# Patient Record
Sex: FEMALE | Race: BLACK OR AFRICAN AMERICAN | Employment: PART TIME | URBAN - NONMETROPOLITAN AREA
[De-identification: names, ages, dates, MRNs, and addresses within clinical notes are randomized per-mention and may not be internally consistent; named-entity substitution may affect disease eponyms.]

---

## 2017-06-26 ENCOUNTER — NURSE TRIAGE (OUTPATIENT)
Dept: ADMINISTRATIVE | Age: 27
End: 2017-06-26

## 2017-09-11 ENCOUNTER — APPOINTMENT (OUTPATIENT)
Dept: GENERAL RADIOLOGY | Age: 27
End: 2017-09-11
Payer: MEDICARE

## 2017-09-11 ENCOUNTER — HOSPITAL ENCOUNTER (EMERGENCY)
Age: 27
Discharge: HOME | End: 2017-09-11
Payer: MEDICARE

## 2017-09-11 ENCOUNTER — NURSE TRIAGE (OUTPATIENT)
Dept: ADMINISTRATIVE | Age: 27
End: 2017-09-11

## 2017-09-11 VITALS
OXYGEN SATURATION: 100 % | SYSTOLIC BLOOD PRESSURE: 130 MMHG | HEIGHT: 69 IN | DIASTOLIC BLOOD PRESSURE: 64 MMHG | WEIGHT: 220 LBS | RESPIRATION RATE: 16 BRPM | TEMPERATURE: 98.9 F | HEART RATE: 58 BPM | BODY MASS INDEX: 32.58 KG/M2

## 2017-09-11 DIAGNOSIS — S50.11XA CONTUSION OF RIGHT FOREARM, INITIAL ENCOUNTER: Primary | ICD-10-CM

## 2017-09-11 PROCEDURE — 99283 EMERGENCY DEPT VISIT LOW MDM: CPT

## 2017-09-11 PROCEDURE — 73090 X-RAY EXAM OF FOREARM: CPT

## 2017-09-11 PROCEDURE — 73110 X-RAY EXAM OF WRIST: CPT

## 2017-09-11 PROCEDURE — 6370000000 HC RX 637 (ALT 250 FOR IP): Performed by: STUDENT IN AN ORGANIZED HEALTH CARE EDUCATION/TRAINING PROGRAM

## 2017-09-11 RX ORDER — ACETAMINOPHEN 325 MG/1
650 TABLET ORAL ONCE
Status: COMPLETED | OUTPATIENT
Start: 2017-09-11 | End: 2017-09-11

## 2017-09-11 RX ADMIN — ACETAMINOPHEN 650 MG: 325 TABLET ORAL at 13:53

## 2017-09-11 ASSESSMENT — ENCOUNTER SYMPTOMS
EYE DISCHARGE: 0
EYE PAIN: 0
SORE THROAT: 0
VOMITING: 0
RHINORRHEA: 0
SHORTNESS OF BREATH: 0
ABDOMINAL PAIN: 0
COUGH: 0
DIARRHEA: 0
NAUSEA: 0
WHEEZING: 0
BACK PAIN: 0

## 2017-09-11 ASSESSMENT — PAIN DESCRIPTION - ORIENTATION: ORIENTATION: RIGHT

## 2017-09-11 ASSESSMENT — PAIN DESCRIPTION - PROGRESSION: CLINICAL_PROGRESSION: GRADUALLY WORSENING

## 2017-09-11 ASSESSMENT — PAIN SCALES - GENERAL
PAINLEVEL_OUTOF10: 8
PAINLEVEL_OUTOF10: 8

## 2017-09-11 ASSESSMENT — PAIN DESCRIPTION - DESCRIPTORS: DESCRIPTORS: SHARP;ACHING;THROBBING

## 2017-09-11 ASSESSMENT — PAIN DESCRIPTION - LOCATION: LOCATION: WRIST

## 2017-09-11 ASSESSMENT — PAIN DESCRIPTION - FREQUENCY: FREQUENCY: CONTINUOUS

## 2017-09-11 ASSESSMENT — PAIN DESCRIPTION - ONSET: ONSET: SUDDEN

## 2017-09-11 ASSESSMENT — PAIN DESCRIPTION - PAIN TYPE: TYPE: ACUTE PAIN

## 2017-09-18 ENCOUNTER — TELEPHONE (OUTPATIENT)
Dept: FAMILY MEDICINE CLINIC | Age: 27
End: 2017-09-18

## 2017-12-11 ENCOUNTER — NURSE TRIAGE (OUTPATIENT)
Dept: ADMINISTRATIVE | Age: 27
End: 2017-12-11

## 2017-12-11 NOTE — TELEPHONE ENCOUNTER
Mathieu Bhatt had left message on voicemail during the night for Call A Nurse. She wanted information on [facility]/there is no answer at the time of callback. Message left for her to call us back if needed.

## 2017-12-23 ENCOUNTER — APPOINTMENT (OUTPATIENT)
Dept: GENERAL RADIOLOGY | Age: 27
End: 2017-12-23
Payer: MEDICARE

## 2017-12-23 ENCOUNTER — NURSE TRIAGE (OUTPATIENT)
Dept: ADMINISTRATIVE | Age: 27
End: 2017-12-23

## 2017-12-23 ENCOUNTER — HOSPITAL ENCOUNTER (EMERGENCY)
Age: 27
Discharge: HOME | End: 2017-12-23
Attending: FAMILY MEDICINE
Payer: MEDICARE

## 2017-12-23 VITALS
RESPIRATION RATE: 18 BRPM | WEIGHT: 230 LBS | BODY MASS INDEX: 34.07 KG/M2 | HEART RATE: 81 BPM | TEMPERATURE: 98.1 F | OXYGEN SATURATION: 97 % | HEIGHT: 69 IN | DIASTOLIC BLOOD PRESSURE: 77 MMHG | SYSTOLIC BLOOD PRESSURE: 141 MMHG
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
## 2017-12-23 DIAGNOSIS — J45.901 EXACERBATION OF ASTHMA, UNSPECIFIED ASTHMA SEVERITY, UNSPECIFIED WHETHER PERSISTENT: Primary | ICD-10-CM

## 2017-12-23 LAB
FLU A ANTIGEN: NEGATIVE
FLU B ANTIGEN: NEGATIVE
PREGNANCY, URINE: NEGATIVE

## 2017-12-23 PROCEDURE — 87804 INFLUENZA ASSAY W/OPTIC: CPT

## 2017-12-23 PROCEDURE — 99284 EMERGENCY DEPT VISIT MOD MDM: CPT

## 2017-12-23 PROCEDURE — 71010 XR CHEST LIMITED ONE VIEW: CPT

## 2017-12-23 PROCEDURE — 72220 X-RAY EXAM SACRUM TAILBONE: CPT

## 2017-12-23 PROCEDURE — 81025 URINE PREGNANCY TEST: CPT

## 2017-12-23 RX ORDER — PREDNISONE 20 MG/1
40 TABLET ORAL DAILY
Qty: 10 TABLET | Refills: 0 | Status: SHIPPED | OUTPATIENT
Start: 2017-12-23 | End: 2017-12-23

## 2017-12-23 RX ORDER — PREDNISONE 20 MG/1
40 TABLET ORAL DAILY
Qty: 10 TABLET | Refills: 0 | Status: SHIPPED | OUTPATIENT
Start: 2017-12-23 | End: 2017-12-28

## 2017-12-23 ASSESSMENT — PAIN SCALES - GENERAL: PAINLEVEL_OUTOF10: 8

## 2017-12-23 ASSESSMENT — ENCOUNTER SYMPTOMS
EYE DISCHARGE: 0
NAUSEA: 0
RHINORRHEA: 1
WHEEZING: 0
SHORTNESS OF BREATH: 0
ABDOMINAL PAIN: 0
COUGH: 1
BACK PAIN: 1
VOMITING: 0
EYE PAIN: 0
SORE THROAT: 0
DIARRHEA: 0

## 2017-12-23 ASSESSMENT — PAIN DESCRIPTION - DESCRIPTORS: DESCRIPTORS: BURNING;THROBBING

## 2017-12-23 ASSESSMENT — PAIN DESCRIPTION - PAIN TYPE: TYPE: ACUTE PAIN

## 2017-12-23 ASSESSMENT — PAIN DESCRIPTION - FREQUENCY: FREQUENCY: CONTINUOUS

## 2017-12-23 ASSESSMENT — PAIN DESCRIPTION - LOCATION: LOCATION: OTHER (COMMENT)

## 2017-12-23 NOTE — ED PROVIDER NOTES
drugs.    PHYSICAL EXAM     INITIAL VITALS:  height is 5' 8.5\" (1.74 m) and weight is 230 lb (104.3 kg). Her oral temperature is 98.1 °F (36.7 °C). Her blood pressure is 141/77 (abnormal) and her pulse is 81. Her respiration is 18 and oxygen saturation is 97%. Physical Exam   Constitutional: She is oriented to person, place, and time. She appears well-developed and well-nourished. HENT:   Head: Normocephalic and atraumatic. Right Ear: Hearing, external ear and ear canal normal. No drainage or swelling. Tympanic membrane is not injected, not scarred, not perforated, not erythematous, not retracted and not bulging. No decreased hearing is noted. Left Ear: Tympanic membrane, external ear and ear canal normal. No drainage or swelling. Tympanic membrane is not injected, not scarred, not perforated, not erythematous, not retracted and not bulging. No decreased hearing is noted. Nose: Rhinorrhea (clear nasal diascharge) present. Mouth/Throat: Oropharynx is clear and moist and mucous membranes are normal.   Eyes: Conjunctivae are normal. Right eye exhibits no discharge. Left eye exhibits no discharge. No scleral icterus. Neck: Normal range of motion. Neck supple. No JVD present. Cardiovascular: Normal rate, regular rhythm and normal heart sounds. Exam reveals no gallop and no friction rub. No murmur heard. Pulmonary/Chest: Effort normal. No respiratory distress. She has no decreased breath sounds. She has wheezes. She has no rhonchi. She has no rales. Abdominal: Soft. She exhibits no distension. There is no tenderness. There is no rebound and no guarding. Musculoskeletal: Normal range of motion. She exhibits no edema. Tenderness in coccyx. Pain exacerbation with leg raise. Neurological: She is alert and oriented to person, place, and time. She exhibits normal muscle tone. She displays no seizure activity. GCS eye subscore is 4. GCS verbal subscore is 5. GCS motor subscore is 6.    Skin: Skin is warm and dry. No rash noted. She is not diaphoretic. Psychiatric: She has a normal mood and affect. Her behavior is normal. Thought content normal.   Nursing note and vitals reviewed. DIFFERENTIAL DIAGNOSIS not limited to    Fracture, Asthma exacerbation, Pneumonia, Bronchitis, and Influenza. DIAGNOSTIC RESULTS     EKG: All EKG's are interpreted by the Emergency Department Physician who either signs or Co-signs this chart in the absence of a cardiologist.  EKG interpreted by Jeff Sequeira MD:    None    RADIOLOGY: non-plain film images(s) such as CT, Ultrasound and MRI are read by the radiologist.    XR SACRUM COCCYX (MIN 2 VIEWS)   Final Result   No acute findings            **This report has been created using voice recognition software. It may contain minor errors which are inherent in voice recognition technology. **      Final report electronically signed by Dr. Marco Foster on 12/23/2017 1:43 PM      XR CHEST 1 VW   Final Result   No acute findings            **This report has been created using voice recognition software. It may contain minor errors which are inherent in voice recognition technology. **      Final report electronically signed by Dr. Marco Foster on 12/23/2017 1:41 PM          LABS:   Labs Reviewed   RAPID INFLUENZA A/B ANTIGENS   PREGNANCY, URINE       EMERGENCY DEPARTMENT COURSE:   Vitals:    Vitals:    12/23/17 1224   BP: (!) 141/77   Pulse: 81   Resp: 18   Temp: 98.1 °F (36.7 °C)   TempSrc: Oral   SpO2: 97%   Weight: 230 lb (104.3 kg)   Height: 5' 8.5\" (1.74 m)       12:36 PM: The patient was seen and evaluated. MDM:  She was seen and evaluated in the emergency for the full tailbone pain. Neurovascularly intact as mentioned above. Negative leg raising test.  Motor 5 over 5 muscle bar. Patient had tenderness in the coccyx area. Patient also had wheezes and states that she's been coughing and having wheezing for the last few days.   Patient has history of asthma has

## 2017-12-23 NOTE — TELEPHONE ENCOUNTER
Reason for Disposition   Minor tailbone injury    Answer Assessment - Initial Assessment Questions  1. MECHANISM: \"How did the injury happen? \"        Slipped on ice   2. ONSET: \"When did the injury happen? \" (Minutes or hours ago)       15 min ago   3. LOCATION: \"Where is the injury located? \"       tailbone  4. SEVERITY: \"Can you sit? \" \"Can you walk? \"       Can walk  In the car sitting now   5. PAIN: \"Is there pain? \" If so, ask: \"How bad is the pain? \"    (Scale 1-10; or mild, moderate, severe)      Hurts  Sharp pain  Moderate   6. SIZE: For bruises, or swelling, ask: \"How large is it? \" (e.g., inches or centimeters)       none  7. OTHER SYMPTOMS: \"Do you have any other symptoms? \" (e.g., numbness, back pain)      none  8. PREGNANCY: \"Is there any chance you are pregnant? \" \"When was your last menstrual period? \"      no    Protocols used: [facility]

## 2018-02-18 ENCOUNTER — NURSE TRIAGE (OUTPATIENT)
Dept: ADMINISTRATIVE | Age: 28
End: 2018-02-18

## 2018-02-18 NOTE — TELEPHONE ENCOUNTER
Reason for Disposition   Menstrual period, missed or late (all triage questions negative)    Answer Assessment - Initial Assessment Questions  1. LMP:  \"When did your last menstrual period begin? \"      1-13-17  2. DAYS LATE: \"How many days late is your menstrual period? \"      Nine days late  3. REGULARITY: \"How regular are your periods? \"      Yes   4. PREGNANCY: \"Is there any chance you are pregnant? \" (e.g., unprotected intercourse, missed birth control pill, broken condom)      Yes   5. BREASTFEEDING: Sherrine Fantasma you breastfeeding? \"      No   6. BIRTH CONTROL PILLS: Sherrine Fantasma you taking birth control pills, or have you stopped recently? \"      No   7. DEPOPROVERA: \"Has your doctor given you a shot to prevent pregnancy? \" (e.g., Depoprovera injection)      No   8. CAUSE: \"What do you think caused the missed period? \" (e.g., stress, rapid weight loss, excessive exercise)      No   9. OTHER SYMPTOMS: \"Do you have any other symptoms? \" (e.g., abdominal pain)      Abdominal cramping 7 out of 10. Nauseated.     Protocols used: MENSTRUAL PERIOD - MISSED OR LATE-ADULT-

## 2018-06-02 ENCOUNTER — HOSPITAL ENCOUNTER (EMERGENCY)
Age: 28
Discharge: HOME | End: 2018-06-02
Payer: MEDICARE

## 2018-06-02 VITALS
RESPIRATION RATE: 16 BRPM | DIASTOLIC BLOOD PRESSURE: 66 MMHG | WEIGHT: 230 LBS | TEMPERATURE: 98.8 F | SYSTOLIC BLOOD PRESSURE: 123 MMHG | HEIGHT: 69 IN | OXYGEN SATURATION: 100 % | BODY MASS INDEX: 34.07 KG/M2 | HEART RATE: 72 BPM

## 2018-06-02 DIAGNOSIS — O26.91 COMPLICATION OF PREGNANCY IN FIRST TRIMESTER: Primary | ICD-10-CM

## 2018-06-02 LAB
ABO: NORMAL
ALBUMIN SERPL-MCNC: 4.1 G/DL (ref 3.5–5.1)
ALP BLD-CCNC: 60 U/L (ref 38–126)
ALT SERPL-CCNC: 16 U/L (ref 11–66)
ANION GAP SERPL CALCULATED.3IONS-SCNC: 10 MEQ/L (ref 8–16)
AST SERPL-CCNC: 17 U/L (ref 5–40)
BASOPHILS # BLD: 0.6 %
BASOPHILS ABSOLUTE: 0.1 THOU/MM3 (ref 0–0.1)
BILIRUB SERPL-MCNC: 0.3 MG/DL (ref 0.3–1.2)
BILIRUBIN URINE: NEGATIVE
BLOOD, URINE: NEGATIVE
BUN BLDV-MCNC: 8 MG/DL (ref 7–22)
CALCIUM SERPL-MCNC: 8.7 MG/DL (ref 8.5–10.5)
CHARACTER, URINE: CLEAR
CHLORIDE BLD-SCNC: 107 MEQ/L (ref 98–111)
CO2: 22 MEQ/L (ref 23–33)
COLOR: YELLOW
CREAT SERPL-MCNC: 0.6 MG/DL (ref 0.4–1.2)
EOSINOPHIL # BLD: 2.4 %
EOSINOPHILS ABSOLUTE: 0.2 THOU/MM3 (ref 0–0.4)
GFR SERPL CREATININE-BSD FRML MDRD: > 90 ML/MIN/1.73M2
GLUCOSE BLD-MCNC: 87 MG/DL (ref 70–108)
GLUCOSE URINE: NEGATIVE MG/DL
HCG,BETA SUBUNIT,QUAL,SERUM: 205.9 MIU/ML (ref 0–5)
HCT VFR BLD CALC: 40.4 % (ref 37–47)
HEMOGLOBIN: 13.7 GM/DL (ref 12–16)
KETONES, URINE: NEGATIVE
LEUKOCYTE ESTERASE, URINE: NEGATIVE
LYMPHOCYTES # BLD: 24.7 %
LYMPHOCYTES ABSOLUTE: 2.5 THOU/MM3 (ref 1–4.8)
MCH RBC QN AUTO: 28.9 PG (ref 27–31)
MCHC RBC AUTO-ENTMCNC: 33.9 GM/DL (ref 33–37)
MCV RBC AUTO: 85.1 FL (ref 81–99)
MONOCYTES # BLD: 5.8 %
MONOCYTES ABSOLUTE: 0.6 THOU/MM3 (ref 0.4–1.3)
NITRITE, URINE: NEGATIVE
NUCLEATED RED BLOOD CELLS: 0 /100 WBC
OSMOLALITY CALCULATION: 275.2 MOSMOL/KG (ref 275–300)
PDW BLD-RTO: 13.5 % (ref 11.5–14.5)
PH UA: 6
PLATELET # BLD: 263 THOU/MM3 (ref 130–400)
PMV BLD AUTO: 9.4 FL (ref 7.4–10.4)
POTASSIUM SERPL-SCNC: 3.9 MEQ/L (ref 3.5–5.2)
PROTEIN UA: NEGATIVE
RBC # BLD: 4.75 MILL/MM3 (ref 4.2–5.4)
RH FACTOR: NORMAL
SEG NEUTROPHILS: 66.5 %
SEGMENTED NEUTROPHILS ABSOLUTE COUNT: 6.8 THOU/MM3 (ref 1.8–7.7)
SODIUM BLD-SCNC: 139 MEQ/L (ref 135–145)
SPECIFIC GRAVITY, URINE: 1.01 (ref 1–1.03)
TOTAL PROTEIN: 7.4 G/DL (ref 6.1–8)
UROBILINOGEN, URINE: 1 EU/DL
WBC # BLD: 10.2 THOU/MM3 (ref 4.8–10.8)

## 2018-06-02 PROCEDURE — 36415 COLL VENOUS BLD VENIPUNCTURE: CPT

## 2018-06-02 PROCEDURE — 84702 CHORIONIC GONADOTROPIN TEST: CPT

## 2018-06-02 PROCEDURE — 99284 EMERGENCY DEPT VISIT MOD MDM: CPT

## 2018-06-02 PROCEDURE — 86900 BLOOD TYPING SEROLOGIC ABO: CPT

## 2018-06-02 PROCEDURE — 86901 BLOOD TYPING SEROLOGIC RH(D): CPT

## 2018-06-02 PROCEDURE — 80053 COMPREHEN METABOLIC PANEL: CPT

## 2018-06-02 PROCEDURE — 81003 URINALYSIS AUTO W/O SCOPE: CPT

## 2018-06-02 PROCEDURE — 85025 COMPLETE CBC W/AUTO DIFF WBC: CPT

## 2018-06-02 ASSESSMENT — ENCOUNTER SYMPTOMS
EYE DISCHARGE: 0
SHORTNESS OF BREATH: 0
EYE PAIN: 0
VOMITING: 0
COUGH: 0
BACK PAIN: 1
ABDOMINAL PAIN: 0
WHEEZING: 0
SORE THROAT: 0
DIARRHEA: 0
NAUSEA: 0
RHINORRHEA: 0

## 2018-06-02 ASSESSMENT — PAIN DESCRIPTION - LOCATION: LOCATION: BACK

## 2018-06-02 ASSESSMENT — PAIN DESCRIPTION - ORIENTATION: ORIENTATION: RIGHT

## 2018-06-02 ASSESSMENT — PAIN SCALES - GENERAL: PAINLEVEL_OUTOF10: 7

## 2018-06-02 ASSESSMENT — PAIN DESCRIPTION - PAIN TYPE: TYPE: ACUTE PAIN

## 2018-06-04 ENCOUNTER — HOSPITAL ENCOUNTER (OUTPATIENT)
Age: 28
Discharge: HOME | End: 2018-06-04
Payer: MEDICARE

## 2018-06-04 LAB — HCG,BETA SUBUNIT,QUAL,SERUM: 685.1 MIU/ML (ref 0–5)

## 2018-06-04 PROCEDURE — 84702 CHORIONIC GONADOTROPIN TEST: CPT

## 2018-06-04 PROCEDURE — 36415 COLL VENOUS BLD VENIPUNCTURE: CPT

## 2018-06-18 ENCOUNTER — NURSE TRIAGE (OUTPATIENT)
Dept: ADMINISTRATIVE | Age: 28
End: 2018-06-18

## 2018-08-25 ENCOUNTER — NURSE TRIAGE (OUTPATIENT)
Dept: ADMINISTRATIVE | Age: 28
End: 2018-08-25

## 2018-08-28 ENCOUNTER — APPOINTMENT (OUTPATIENT)
Dept: GENERAL RADIOLOGY | Age: 28
End: 2018-08-28
Payer: MEDICARE

## 2018-08-28 ENCOUNTER — HOSPITAL ENCOUNTER (EMERGENCY)
Age: 28
Discharge: HOME | End: 2018-08-28
Attending: EMERGENCY MEDICINE
Payer: MEDICARE

## 2018-08-28 VITALS
DIASTOLIC BLOOD PRESSURE: 63 MMHG | HEART RATE: 88 BPM | SYSTOLIC BLOOD PRESSURE: 124 MMHG | OXYGEN SATURATION: 97 % | BODY MASS INDEX: 34.07 KG/M2 | WEIGHT: 230 LBS | RESPIRATION RATE: 16 BRPM | HEIGHT: 69 IN | TEMPERATURE: 98.6 F

## 2018-08-28 DIAGNOSIS — J45.20 MILD INTERMITTENT ASTHMA WITHOUT COMPLICATION: Primary | ICD-10-CM

## 2018-08-28 PROCEDURE — 2709999900 HC NON-CHARGEABLE SUPPLY

## 2018-08-28 PROCEDURE — 6370000000 HC RX 637 (ALT 250 FOR IP): Performed by: EMERGENCY MEDICINE

## 2018-08-28 PROCEDURE — 94640 AIRWAY INHALATION TREATMENT: CPT

## 2018-08-28 PROCEDURE — 71046 X-RAY EXAM CHEST 2 VIEWS: CPT

## 2018-08-28 PROCEDURE — 99285 EMERGENCY DEPT VISIT HI MDM: CPT

## 2018-08-28 RX ORDER — ALBUTEROL SULFATE 0.63 MG/3ML
1 SOLUTION RESPIRATORY (INHALATION) EVERY 6 HOURS PRN
COMMUNITY
End: 2020-03-16 | Stop reason: SDUPTHER

## 2018-08-28 RX ORDER — ALBUTEROL SULFATE 90 UG/1
2 AEROSOL, METERED RESPIRATORY (INHALATION) EVERY 6 HOURS PRN
COMMUNITY
End: 2021-10-18 | Stop reason: ALTCHOICE

## 2018-08-28 RX ORDER — IPRATROPIUM BROMIDE AND ALBUTEROL SULFATE 2.5; .5 MG/3ML; MG/3ML
1 SOLUTION RESPIRATORY (INHALATION) ONCE
Status: COMPLETED | OUTPATIENT
Start: 2018-08-28 | End: 2018-08-28

## 2018-08-28 RX ADMIN — IPRATROPIUM BROMIDE AND ALBUTEROL SULFATE 1 AMPULE: .5; 3 SOLUTION RESPIRATORY (INHALATION) at 16:14

## 2018-08-28 ASSESSMENT — ENCOUNTER SYMPTOMS
VOMITING: 0
ABDOMINAL PAIN: 0
WHEEZING: 0
SHORTNESS OF BREATH: 1
COUGH: 1
RHINORRHEA: 0
SORE THROAT: 0
EYE PAIN: 0
BACK PAIN: 0
DIARRHEA: 0
NAUSEA: 0
EYE DISCHARGE: 0

## 2018-08-28 ASSESSMENT — PAIN DESCRIPTION - PAIN TYPE: TYPE: ACUTE PAIN

## 2018-08-28 ASSESSMENT — PAIN DESCRIPTION - LOCATION: LOCATION: BACK

## 2018-08-28 ASSESSMENT — PAIN SCALES - GENERAL: PAINLEVEL_OUTOF10: 5

## 2018-08-28 NOTE — ED TRIAGE NOTES
Patient presents to room 35 with complaints of shortness of breath. Patient stated it started 4 days ago and has been in and out of ED and urgent care. Pt has a history of asthma. No acute distress noted at this time.

## 2018-08-28 NOTE — ED NOTES
Ambulated patient in cotter. SPO2 prior to ambulation was 97% and post ambulation was 98%. No labored breathing or distress noted.      Duglas Ferraro RN  08/28/18 1200

## 2018-08-28 NOTE — ED PROVIDER NOTES
adenopathy. Psychiatric/Behavioral: Negative for confusion and suicidal ideas. The patient is not nervous/anxious. PAST MEDICAL HISTORY    has a past medical history of Asthma and Nicotine dependence. SURGICAL HISTORY      has a past surgical history that includes  section. CURRENT MEDICATIONS       Discharge Medication List as of 2018  4:28 PM      CONTINUE these medications which have NOT CHANGED    Details   albuterol (ACCUNEB) 0.63 MG/3ML nebulizer solution Take 1 ampule by nebulization every 6 hours as needed for WheezingHistorical Med      albuterol sulfate  (90 Base) MCG/ACT inhaler Inhale 2 puffs into the lungs every 6 hours as needed for WheezingHistorical Med             ALLERGIES     is allergic to rocephin [ceftriaxone] and toradol [ketorolac tromethamine]. FAMILY HISTORY     indicated that her mother is alive. She indicated that her father is alive. family history includes Diabetes in her mother; High Blood Pressure in her mother; Other in her father. SOCIAL HISTORY      reports that she has been smoking Cigarettes. She has been smoking about 0.50 packs per day. She has never used smokeless tobacco. She reports that she does not drink alcohol or use drugs. PHYSICAL EXAM     INITIAL VITALS:  height is 5' 9\" (1.753 m) and weight is 230 lb (104.3 kg). Her oral temperature is 98.6 °F (37 °C). Her blood pressure is 124/63 and her pulse is 88. Her respiration is 16 and oxygen saturation is 97%. Physical Exam   Constitutional: She is oriented to person, place, and time. She appears well-developed and well-nourished. Non-toxic appearance. HENT:   Head: Normocephalic and atraumatic.    Right Ear: Tympanic membrane and external ear normal.   Left Ear: Tympanic membrane and external ear normal.   Nose: Nose normal.   Mouth/Throat: Oropharynx is clear and moist and mucous membranes are normal. No oropharyngeal exudate, posterior oropharyngeal edema or posterior oropharyngeal erythema. Eyes: Conjunctivae and EOM are normal.   Neck: Normal range of motion. Neck supple. No JVD present. Cardiovascular: Normal rate, regular rhythm, normal heart sounds, intact distal pulses and normal pulses. Exam reveals no gallop and no friction rub. No murmur heard. Pulmonary/Chest: Effort normal. No respiratory distress. She has no decreased breath sounds. She has wheezes. She has no rhonchi. She has no rales. Inspiratory and expiratory wheezes mostly to the left lung. Abdominal: Soft. Bowel sounds are normal. She exhibits no distension. There is no tenderness. There is no rebound, no guarding and no CVA tenderness. No abdominal pain or tenderness. Musculoskeletal: Normal range of motion. She exhibits no edema. Neurological: She is alert and oriented to person, place, and time. She exhibits normal muscle tone. Coordination normal.   Skin: Skin is warm and dry. No rash noted. She is not diaphoretic. Nursing note and vitals reviewed. DIFFERENTIAL DIAGNOSIS:   Pneumonia, bronchitis, asthma     DIAGNOSTIC RESULTS     EKG: All EKG's are interpreted by the Emergency Department Physician who either signs or Co-signs this chart in the absence of a cardiologist.  EKG interpreted by Koko Hollins, DO:    None    RADIOLOGY: non-plain film images(s) such as CT, Ultrasound and MRI are read by the radiologist.    XR CHEST STANDARD (2 VW)   Final Result   No definite lobar consolidation. Probable small right pleural effusion. Correlation advised. **This report has been created using voice recognition software. It may contain minor errors which are inherent in voice recognition technology. **      Final report electronically signed by Dr. King Bryant on 8/28/2018 3:52 PM          LABS:   Labs Reviewed - No data to display    EMERGENCY DEPARTMENT COURSE:   Vitals:    Vitals:    08/28/18 1503 08/28/18 1510   BP: 124/63    Pulse: 88 88   Resp: 16    Temp: 98.6 °F (37 °C) TempSrc: Oral    SpO2: 97%    Weight: 230 lb (104.3 kg)    Height: 5' 9\" (1.753 m)        3:42 PM: The patient was seen and evaluated. MDM:  The patient was seen and evaluated by me for shortness of breath with a history of asthma. The patients vital signs were within acceptable range. Upon exam, the patient had inspiratory and expiratory wheezes, mostly in the left lung. She exhibits no abdominal tenderness. Regular cardiac rate and rhythm. The patients SPO2 remained stable at 98% while ambulating in the cotter. Chest xray shows no definite lobar consolidation. Probable small right pleural effusion. She was treated with Duoneb while in the department. The patient's condition improved during her stay in the ED. The patient was amenable to my plan of discharge. They were discharged home , and they will return to the ED if their symptoms become more severe in nature, or otherwise change. CRITICAL CARE:   None     CONSULTS:  None    PROCEDURES:  None     FINAL IMPRESSION      1. Mild intermittent asthma without complication          DISPOSITION/PLAN   discharge    PATIENT REFERRED TO:  ABHAY Mendoza [facility]  [address] [phone]    In 3 days  RE-CHECK AND FURTHER TESTING AS NEEDED      DISCHARGE MEDICATIONS:  Discharge Medication List as of 8/28/2018  4:28 PM          (Please note that portions of this note were completed with a voice recognition program.  Efforts were made to edit the dictations but occasionally words are mis-transcribed.)    Scribe:  yRan Roe   8/28/18 3:42 PM Scribing for and in the presence of Markell Roegrs DO. Signed by: Tsering Arrington 08/28/18 5:08 PM    Provider:  I personally performed the services described in the documentation, reviewed and edited the documentation which was dictated to the scribe in my presence, and it accurately records my words and actions.     Markell Rogers DO 8/28/18 5:08 PM        Markell Rogers DO  08/31/18 5216

## 2018-12-21 ENCOUNTER — HOSPITAL ENCOUNTER (OUTPATIENT)
Age: 28
Setting detail: SPECIMEN
Discharge: HOME | End: 2018-12-21
Payer: MEDICARE

## 2018-12-22 LAB — TSH SERPL DL<=0.05 MIU/L-ACNC: 1.8 MIU/L (ref 0.3–5)

## 2019-02-07 ENCOUNTER — HOSPITAL ENCOUNTER (EMERGENCY)
Age: 29
Discharge: HOME | End: 2019-02-07
Payer: MEDICARE

## 2019-02-07 ENCOUNTER — APPOINTMENT (OUTPATIENT)
Dept: CT IMAGING | Age: 29
End: 2019-02-07
Payer: MEDICARE

## 2019-02-07 ENCOUNTER — NURSE TRIAGE (OUTPATIENT)
Dept: ADMINISTRATIVE | Age: 29
End: 2019-02-07

## 2019-02-07 VITALS
DIASTOLIC BLOOD PRESSURE: 75 MMHG | RESPIRATION RATE: 18 BRPM | OXYGEN SATURATION: 97 % | WEIGHT: 230 LBS | TEMPERATURE: 98.2 F | HEART RATE: 58 BPM | BODY MASS INDEX: 33.97 KG/M2 | SYSTOLIC BLOOD PRESSURE: 132 MMHG

## 2019-02-07 DIAGNOSIS — L08.9 WOUND INFECTION: Primary | ICD-10-CM

## 2019-02-07 DIAGNOSIS — T14.8XXA WOUND INFECTION: Primary | ICD-10-CM

## 2019-02-07 DIAGNOSIS — R93.89 ABNORMAL FINDING ON CT SCAN: ICD-10-CM

## 2019-02-07 LAB
ALBUMIN SERPL-MCNC: 3.3 G/DL (ref 3.5–5.1)
ALP BLD-CCNC: 99 U/L (ref 38–126)
ALT SERPL-CCNC: 27 U/L (ref 11–66)
ANION GAP SERPL CALCULATED.3IONS-SCNC: 9 MEQ/L (ref 8–16)
AST SERPL-CCNC: 27 U/L (ref 5–40)
BACTERIA: ABNORMAL /HPF
BASOPHILS # BLD: 0.2 %
BASOPHILS ABSOLUTE: 0 THOU/MM3 (ref 0–0.1)
BILIRUB SERPL-MCNC: < 0.2 MG/DL (ref 0.3–1.2)
BILIRUBIN DIRECT: < 0.2 MG/DL (ref 0–0.3)
BILIRUBIN URINE: NEGATIVE
BLOOD, URINE: ABNORMAL
BUN BLDV-MCNC: 8 MG/DL (ref 7–22)
CALCIUM SERPL-MCNC: 8.7 MG/DL (ref 8.5–10.5)
CASTS 2: ABNORMAL /LPF
CASTS UA: ABNORMAL /LPF
CHARACTER, URINE: CLEAR
CHLORIDE BLD-SCNC: 107 MEQ/L (ref 98–111)
CO2: 25 MEQ/L (ref 23–33)
COLOR: YELLOW
CREAT SERPL-MCNC: 0.7 MG/DL (ref 0.4–1.2)
CRYSTALS, UA: ABNORMAL
EKG ATRIAL RATE: 54 BPM
EKG P AXIS: 29 DEGREES
EKG P-R INTERVAL: 132 MS
EKG Q-T INTERVAL: 448 MS
EKG QRS DURATION: 66 MS
EKG QTC CALCULATION (BAZETT): 424 MS
EKG R AXIS: 8 DEGREES
EKG T AXIS: 18 DEGREES
EKG VENTRICULAR RATE: 54 BPM
EOSINOPHIL # BLD: 2.9 %
EOSINOPHILS ABSOLUTE: 0.3 THOU/MM3 (ref 0–0.4)
EPITHELIAL CELLS, UA: ABNORMAL /HPF
ERYTHROCYTE [DISTWIDTH] IN BLOOD BY AUTOMATED COUNT: 13.3 % (ref 11.5–14.5)
ERYTHROCYTE [DISTWIDTH] IN BLOOD BY AUTOMATED COUNT: 42.9 FL (ref 35–45)
FLU A ANTIGEN: NEGATIVE
FLU B ANTIGEN: NEGATIVE
GFR SERPL CREATININE-BSD FRML MDRD: > 90 ML/MIN/1.73M2
GLUCOSE BLD-MCNC: 93 MG/DL (ref 70–108)
GLUCOSE URINE: NEGATIVE MG/DL
HCT VFR BLD CALC: 28.8 % (ref 37–47)
HEMOGLOBIN: 9.7 GM/DL (ref 12–16)
IMMATURE GRANS (ABS): 0.03 THOU/MM3 (ref 0–0.07)
IMMATURE GRANULOCYTES: 0.3 %
KETONES, URINE: NEGATIVE
LACTIC ACID: 0.7 MMOL/L (ref 0.5–2.2)
LEUKOCYTE ESTERASE, URINE: ABNORMAL
LIPASE: 12.8 U/L (ref 5.6–51.3)
LYMPHOCYTES # BLD: 21 %
LYMPHOCYTES ABSOLUTE: 2 THOU/MM3 (ref 1–4.8)
MAGNESIUM: 1.8 MG/DL (ref 1.6–2.4)
MCH RBC QN AUTO: 29.7 PG (ref 26–33)
MCHC RBC AUTO-ENTMCNC: 33.7 GM/DL (ref 32.2–35.5)
MCV RBC AUTO: 88.1 FL (ref 81–99)
MISCELLANEOUS 2: ABNORMAL
MONOCYTES # BLD: 5.8 %
MONOCYTES ABSOLUTE: 0.5 THOU/MM3 (ref 0.4–1.3)
NITRITE, URINE: NEGATIVE
NUCLEATED RED BLOOD CELLS: 0 /100 WBC
OSMOLALITY CALCULATION: 279.3 MOSMOL/KG (ref 275–300)
PH UA: 5.5
PLATELET # BLD: 269 THOU/MM3 (ref 130–400)
PMV BLD AUTO: 11.1 FL (ref 9.4–12.4)
POTASSIUM SERPL-SCNC: 4.1 MEQ/L (ref 3.5–5.2)
PROTEIN UA: NEGATIVE
RBC # BLD: 3.27 MILL/MM3 (ref 4.2–5.4)
RBC URINE: ABNORMAL /HPF
RENAL EPITHELIAL, UA: ABNORMAL
SEG NEUTROPHILS: 69.8 %
SEGMENTED NEUTROPHILS ABSOLUTE COUNT: 6.6 THOU/MM3 (ref 1.8–7.7)
SODIUM BLD-SCNC: 141 MEQ/L (ref 135–145)
SPECIFIC GRAVITY, URINE: 1.02 (ref 1–1.03)
TOTAL PROTEIN: 6.3 G/DL (ref 6.1–8)
TROPONIN T: < 0.01 NG/ML
UROBILINOGEN, URINE: 0.2 EU/DL
WBC # BLD: 9.4 THOU/MM3 (ref 4.8–10.8)
WBC UA: ABNORMAL /HPF
YEAST: ABNORMAL

## 2019-02-07 PROCEDURE — 36415 COLL VENOUS BLD VENIPUNCTURE: CPT

## 2019-02-07 PROCEDURE — 83735 ASSAY OF MAGNESIUM: CPT

## 2019-02-07 PROCEDURE — 99285 EMERGENCY DEPT VISIT HI MDM: CPT

## 2019-02-07 PROCEDURE — 87205 SMEAR GRAM STAIN: CPT

## 2019-02-07 PROCEDURE — 93005 ELECTROCARDIOGRAM TRACING: CPT | Performed by: NURSE PRACTITIONER

## 2019-02-07 PROCEDURE — 71275 CT ANGIOGRAPHY CHEST: CPT

## 2019-02-07 PROCEDURE — 80053 COMPREHEN METABOLIC PANEL: CPT

## 2019-02-07 PROCEDURE — 6360000004 HC RX CONTRAST MEDICATION: Performed by: NURSE PRACTITIONER

## 2019-02-07 PROCEDURE — 83690 ASSAY OF LIPASE: CPT

## 2019-02-07 PROCEDURE — 74177 CT ABD & PELVIS W/CONTRAST: CPT

## 2019-02-07 PROCEDURE — 82248 BILIRUBIN DIRECT: CPT

## 2019-02-07 PROCEDURE — 87804 INFLUENZA ASSAY W/OPTIC: CPT

## 2019-02-07 PROCEDURE — 87070 CULTURE OTHR SPECIMN AEROBIC: CPT

## 2019-02-07 PROCEDURE — 85025 COMPLETE CBC W/AUTO DIFF WBC: CPT

## 2019-02-07 PROCEDURE — 84484 ASSAY OF TROPONIN QUANT: CPT

## 2019-02-07 PROCEDURE — 83605 ASSAY OF LACTIC ACID: CPT

## 2019-02-07 PROCEDURE — 81001 URINALYSIS AUTO W/SCOPE: CPT

## 2019-02-07 RX ORDER — CIPROFLOXACIN 500 MG/1
500 TABLET, FILM COATED ORAL 2 TIMES DAILY
Qty: 20 TABLET | Refills: 0 | Status: SHIPPED | OUTPATIENT
Start: 2019-02-07 | End: 2019-02-07

## 2019-02-07 RX ORDER — SULFAMETHOXAZOLE AND TRIMETHOPRIM 800; 160 MG/1; MG/1
1 TABLET ORAL 2 TIMES DAILY
Qty: 20 TABLET | Refills: 0 | Status: SHIPPED | OUTPATIENT
Start: 2019-02-07 | End: 2019-02-17

## 2019-02-07 RX ADMIN — IOPAMIDOL 80 ML: 755 INJECTION, SOLUTION INTRAVENOUS at 02:57

## 2019-02-07 ASSESSMENT — ENCOUNTER SYMPTOMS
SORE THROAT: 0
WHEEZING: 0
BACK PAIN: 0
VOMITING: 0
EYE PAIN: 0
RHINORRHEA: 0
EYE DISCHARGE: 0
COUGH: 0
NAUSEA: 0
DIARRHEA: 0
ABDOMINAL PAIN: 1
SHORTNESS OF BREATH: 0

## 2019-02-07 ASSESSMENT — PAIN DESCRIPTION - ORIENTATION: ORIENTATION: LEFT;RIGHT

## 2019-02-07 ASSESSMENT — PAIN DESCRIPTION - LOCATION: LOCATION: FOOT

## 2019-02-07 ASSESSMENT — PAIN SCALES - GENERAL: PAINLEVEL_OUTOF10: 6

## 2019-02-08 PROCEDURE — 93010 ELECTROCARDIOGRAM REPORT: CPT | Performed by: INTERNAL MEDICINE

## 2019-02-09 LAB
AEROBIC CULTURE: NORMAL
GRAM STAIN RESULT: NORMAL

## 2019-02-28 ENCOUNTER — APPOINTMENT (OUTPATIENT)
Dept: ULTRASOUND IMAGING | Age: 29
End: 2019-02-28
Payer: MEDICARE

## 2019-02-28 ENCOUNTER — APPOINTMENT (OUTPATIENT)
Dept: GENERAL RADIOLOGY | Age: 29
End: 2019-02-28
Payer: MEDICARE

## 2019-02-28 ENCOUNTER — HOSPITAL ENCOUNTER (EMERGENCY)
Age: 29
Discharge: HOME | End: 2019-02-28
Payer: MEDICARE

## 2019-02-28 VITALS
WEIGHT: 220 LBS | BODY MASS INDEX: 32.58 KG/M2 | TEMPERATURE: 97.9 F | RESPIRATION RATE: 18 BRPM | OXYGEN SATURATION: 100 % | SYSTOLIC BLOOD PRESSURE: 133 MMHG | DIASTOLIC BLOOD PRESSURE: 68 MMHG | HEART RATE: 70 BPM | HEIGHT: 69 IN

## 2019-02-28 DIAGNOSIS — Q89.2 THYROGLOSSAL DUCT CYST: Primary | ICD-10-CM

## 2019-02-28 PROCEDURE — 70360 X-RAY EXAM OF NECK: CPT

## 2019-02-28 PROCEDURE — 99283 EMERGENCY DEPT VISIT LOW MDM: CPT

## 2019-02-28 PROCEDURE — 76536 US EXAM OF HEAD AND NECK: CPT

## 2019-02-28 PROCEDURE — 6370000000 HC RX 637 (ALT 250 FOR IP): Performed by: NURSE PRACTITIONER

## 2019-02-28 RX ORDER — CLINDAMYCIN HYDROCHLORIDE 300 MG/1
300 CAPSULE ORAL 3 TIMES DAILY
Qty: 30 CAPSULE | Refills: 0 | Status: SHIPPED | OUTPATIENT
Start: 2019-02-28 | End: 2019-03-14

## 2019-02-28 RX ADMIN — Medication 5 ML: at 17:22

## 2019-02-28 ASSESSMENT — ENCOUNTER SYMPTOMS
NAUSEA: 0
COLOR CHANGE: 0
ABDOMINAL DISTENTION: 0
SINUS PRESSURE: 0
COUGH: 0
PHOTOPHOBIA: 0
TROUBLE SWALLOWING: 1
BLOOD IN STOOL: 0
SHORTNESS OF BREATH: 0
RHINORRHEA: 0
DIARRHEA: 0
VOMITING: 0
CHEST TIGHTNESS: 0
EYE REDNESS: 0
SORE THROAT: 0
VOICE CHANGE: 1
WHEEZING: 0
ABDOMINAL PAIN: 0
CONSTIPATION: 0
BACK PAIN: 0

## 2019-03-01 ENCOUNTER — TELEPHONE (OUTPATIENT)
Dept: ENT CLINIC | Age: 29
End: 2019-03-01

## 2019-03-02 ENCOUNTER — NURSE TRIAGE (OUTPATIENT)
Dept: ADMINISTRATIVE | Age: 29
End: 2019-03-02

## 2019-03-14 ENCOUNTER — OFFICE VISIT (OUTPATIENT)
Dept: ENT CLINIC | Age: 29
End: 2019-03-14
Payer: MEDICARE

## 2019-03-14 ENCOUNTER — HOSPITAL ENCOUNTER (OUTPATIENT)
Age: 29
Discharge: HOME | End: 2019-03-14
Payer: MEDICARE

## 2019-03-14 ENCOUNTER — TELEPHONE (OUTPATIENT)
Dept: ENT CLINIC | Age: 29
End: 2019-03-14

## 2019-03-14 VITALS
RESPIRATION RATE: 14 BRPM | HEIGHT: 69 IN | HEART RATE: 80 BPM | TEMPERATURE: 98.8 F | BODY MASS INDEX: 35.4 KG/M2 | WEIGHT: 239 LBS | DIASTOLIC BLOOD PRESSURE: 70 MMHG | SYSTOLIC BLOOD PRESSURE: 118 MMHG

## 2019-03-14 DIAGNOSIS — Q89.2 THYROGLOSSAL DUCT CYST: Primary | ICD-10-CM

## 2019-03-14 DIAGNOSIS — Q89.2 THYROGLOSSAL DUCT CYST: ICD-10-CM

## 2019-03-14 LAB
T4 FREE: 1.01 NG/DL (ref 0.93–1.76)
TSH SERPL DL<=0.05 MIU/L-ACNC: 2.42 UIU/ML (ref 0.4–4.2)

## 2019-03-14 PROCEDURE — 36415 COLL VENOUS BLD VENIPUNCTURE: CPT

## 2019-03-14 PROCEDURE — 84439 ASSAY OF FREE THYROXINE: CPT

## 2019-03-14 PROCEDURE — 84443 ASSAY THYROID STIM HORMONE: CPT

## 2019-03-14 PROCEDURE — 99203 OFFICE O/P NEW LOW 30 MIN: CPT | Performed by: OTOLARYNGOLOGY

## 2019-03-14 RX ORDER — PREDNISONE 20 MG/1
40 TABLET ORAL DAILY
Qty: 12 TABLET | Refills: 0 | Status: SHIPPED | OUTPATIENT
Start: 2019-03-14 | End: 2019-03-28 | Stop reason: SDUPTHER

## 2019-03-19 ENCOUNTER — NURSE TRIAGE (OUTPATIENT)
Dept: ADMINISTRATIVE | Age: 29
End: 2019-03-19

## 2019-03-27 ENCOUNTER — HOSPITAL ENCOUNTER (OUTPATIENT)
Dept: CT IMAGING | Age: 29
Discharge: HOME | End: 2019-03-27
Payer: MEDICARE

## 2019-03-27 DIAGNOSIS — Q89.2 THYROGLOSSAL DUCT CYST: ICD-10-CM

## 2019-03-27 PROCEDURE — 6360000004 HC RX CONTRAST MEDICATION: Performed by: OTOLARYNGOLOGY

## 2019-03-27 PROCEDURE — 70491 CT SOFT TISSUE NECK W/DYE: CPT

## 2019-03-27 RX ADMIN — IOPAMIDOL 65 ML: 755 INJECTION, SOLUTION INTRAVENOUS at 08:28

## 2019-03-28 ENCOUNTER — TELEPHONE (OUTPATIENT)
Dept: ENT CLINIC | Age: 29
End: 2019-03-28

## 2019-03-28 RX ORDER — PREDNISONE 20 MG/1
40 TABLET ORAL DAILY
Qty: 12 TABLET | Refills: 0 | Status: SHIPPED | OUTPATIENT
Start: 2019-03-28 | End: 2019-04-03

## 2019-04-15 ENCOUNTER — OFFICE VISIT (OUTPATIENT)
Dept: ENT CLINIC | Age: 29
End: 2019-04-15
Payer: MEDICARE

## 2019-04-15 VITALS
TEMPERATURE: 98.2 F | WEIGHT: 245.3 LBS | BODY MASS INDEX: 36.33 KG/M2 | HEART RATE: 68 BPM | RESPIRATION RATE: 16 BRPM | SYSTOLIC BLOOD PRESSURE: 128 MMHG | HEIGHT: 69 IN | DIASTOLIC BLOOD PRESSURE: 80 MMHG

## 2019-04-15 DIAGNOSIS — Z87.891 FORMER SMOKER: ICD-10-CM

## 2019-04-15 DIAGNOSIS — Z01.818 PRE-OP TESTING: Primary | ICD-10-CM

## 2019-04-15 DIAGNOSIS — Q89.2 THYROGLOSSAL CYST: Primary | ICD-10-CM

## 2019-04-15 PROCEDURE — 99213 OFFICE O/P EST LOW 20 MIN: CPT | Performed by: OTOLARYNGOLOGY

## 2019-04-15 PROCEDURE — G8417 CALC BMI ABV UP PARAM F/U: HCPCS | Performed by: OTOLARYNGOLOGY

## 2019-04-15 PROCEDURE — G8427 DOCREV CUR MEDS BY ELIG CLIN: HCPCS | Performed by: OTOLARYNGOLOGY

## 2019-04-15 PROCEDURE — 1036F TOBACCO NON-USER: CPT | Performed by: OTOLARYNGOLOGY

## 2019-04-15 ASSESSMENT — ENCOUNTER SYMPTOMS
FACIAL SWELLING: 0
VOMITING: 0
EYE ITCHING: 0
RHINORRHEA: 0
STRIDOR: 0
TROUBLE SWALLOWING: 1
SORE THROAT: 0
NAUSEA: 0
SHORTNESS OF BREATH: 0
COUGH: 0
COLOR CHANGE: 0
VOICE CHANGE: 0
SINUS PAIN: 0
SINUS PRESSURE: 0

## 2019-04-15 NOTE — PROGRESS NOTES
[address] [id number], NOSE AND THROAT  [facility] [address]  [facility] [address] [address]  Dept: [phone]  Dept Fax: [phone]  Loc: [phone]    Marci Perkins is a 33 y.o. female who was referred byNo ref. provider found for:  Chief Complaint   Patient presents with    Other     1 month follow up after thyroid labs. Criss Boucher HPI:   INITIAL HPI:  Marci Perkins is a 33 y.o. female who is 6 weeks post-partum, who presents today for evaluation of infected thyroglossal duct cyst.  She had anterior neck swelling following a URI/sore throat and had gone to the ER. Neck US 2/28/19 revealed cystic mass most consistent with thyroglossal duct cyst.  This is the first time she had this type of swelling. No F/C, N/V. She denies other cervical lymphadenopathy. No issues with swallowing or breathing. She does report globus sensation. Denies history of GERD. No unintentional weight loss. She finished a 14 day course of clindamycin recently. She noted that her neck swelling improved after abx, but she is not yet back to baseline. INTERVAL HPI:  She returns 1 month after infected thyroglossal duct. She reports no new issues since last visit. She finished her round of antibiotic. She is here to discuss thyroglossal duct cyst removal.    History: Allergies   Allergen Reactions    Rocephin [Ceftriaxone] Anaphylaxis    Toradol [Ketorolac Tromethamine] Hives     Current Outpatient Medications   Medication Sig Dispense Refill    albuterol (ACCUNEB) 0.63 MG/3ML nebulizer solution Take 1 ampule by nebulization every 6 hours as needed for Wheezing      albuterol sulfate  (90 Base) MCG/ACT inhaler Inhale 2 puffs into the lungs every 6 hours as needed for Wheezing       No current facility-administered medications for this visit.       Past Medical History:   Diagnosis Date    Asthma     Nicotine dependence       Past Surgical History:   Procedure Laterality Date     SECTION      x3     Family History   Problem Relation Age of Onset    Diabetes Mother     High Blood Pressure Mother     Other Father      Social History     Tobacco Use    Smoking status: Former Smoker     Packs/day: 0.50     Years: 9.00     Pack years: 4.50     Types: Cigarettes     Last attempt to quit: 2019     Years since quittin.1    Smokeless tobacco: Never Used   Substance Use Topics    Alcohol use: No       Subjective:      Review of Systems   Constitutional: Negative. HENT: Positive for trouble swallowing. Negative for ear discharge, ear pain, facial swelling, hearing loss, nosebleeds, postnasal drip, rhinorrhea, sinus pressure, sinus pain, sore throat, tinnitus and voice change. Eyes: Negative for itching and visual disturbance. Respiratory: Negative for cough, shortness of breath and stridor. Gastrointestinal: Negative for nausea and vomiting. Musculoskeletal: Negative for neck pain and neck stiffness. Skin: Negative for color change and wound. Allergic/Immunologic: Negative for environmental allergies and immunocompromised state. Neurological: Negative for dizziness and facial asymmetry. Psychiatric/Behavioral: Negative for behavioral problems and hallucinations. All other systems reviewed and are negative. Rest of review of systems are negative, except as noted in HPI. Objective:   /80 (Site: Left Upper Arm)   Pulse 68   Temp 98.2 °F (36.8 °C) (Oral)   Resp 16   Ht 5' 9\" (1.753 m)   Wt 245 lb 4.8 oz (111.3 kg)   BMI 36.22 kg/m²     PHYSICAL EXAM  Constitutional: He is oriented to person, place, and time. He appears well-developed and well-nourished. No distress. HENT:   Head: Normocephalic and atraumatic. Right Ear: External ear normal. Ear canal clear. Tympanic membrane intact. Middle ear aerated. Left Ear: External ear normal. Ear canal clear. Tympanic membrane intact. Middle ear aerated.     Nose: External nose normal.  Nasal mucosa normal.  No lesions noted. Mouth/Throat: Fair dentition. Oral cavity mucosa normal, no masses or lesions noted. Oropharynx is clear and moist.   Eyes: Pupils are equal, round, and reactive to light. Conjunctivae and EOM are normal.   Neck: Normal range of motion. Neck supple. No JVD present. No tracheal deviation present. No thyromegaly present. There is a 2x2 cm anterior neck mass, soft, non-tender, non-fluctuant. It elevates with tongue protrusion. Cardiovascular: Normal rate. Pulmonary/Chest: Effort normal. No stridor or stertor. No respiratory distress. Musculoskeletal: Normal range of motion. He exhibits no edema or Lymphadenopathy. Neurological: He is alert and oriented to person, place, and time. Cranial nerve II-XII grossly intact. Skin: Skin is warm. No erythema. Psychiatric: Normal mood and affect. Behavior is normal.   Vitals reviewed. Data:  All of the past medical history, past surgical history, family history,social history, allergies and current medications were reviewed with the patient. 3/27/19: Neck CT    1. 2.3 cm cystic-appearing infrahyoid mass most consistent with a thyroglossal duct cyst.   2. 5 mm right thyroid nodule. Assessment & Plan   Diagnoses and all orders for this visit:     Diagnosis Orders   1. Thyroglossal cyst  TX EXCIS THYROID DUCT CYST/SINUS     30-year-old female with anterior neck mass likely thyroglossal duct cyst.  She finished a round of antibiotics and has no signs of active infection. I discussed the risk for surgery which occludes pain, infection bleeding, scar from midline neck incision. We also discussed possibility of recurrence and need for further surgery. She will likely have a drain in her neck immediately after surgery. She does report some dysphagia and globus sensation and I mentioned that this may not improve after surgery. She understood the risks and wished to proceed.   The findings were explained and her questions were answered. No follow-ups on file. Leopoldo Alvine, MD    **This report has been created using voice recognition software. It may contain minor errors which are inherent in voice recognition technology. **

## 2019-04-30 ENCOUNTER — HOSPITAL ENCOUNTER (OUTPATIENT)
Dept: GENERAL RADIOLOGY | Age: 29
Discharge: HOME | End: 2019-04-30
Payer: MEDICARE

## 2019-04-30 ENCOUNTER — HOSPITAL ENCOUNTER (OUTPATIENT)
Age: 29
Discharge: HOME | End: 2019-04-30
Payer: MEDICARE

## 2019-04-30 DIAGNOSIS — Z01.818 PRE-OP TESTING: ICD-10-CM

## 2019-04-30 DIAGNOSIS — Z87.891 FORMER SMOKER: ICD-10-CM

## 2019-04-30 LAB
BASOPHILS # BLD: 0.3 %
BASOPHILS ABSOLUTE: 0 THOU/MM3 (ref 0–0.1)
EOSINOPHIL # BLD: 3.3 %
EOSINOPHILS ABSOLUTE: 0.2 THOU/MM3 (ref 0–0.4)
ERYTHROCYTE [DISTWIDTH] IN BLOOD BY AUTOMATED COUNT: 13.6 % (ref 11.5–14.5)
ERYTHROCYTE [DISTWIDTH] IN BLOOD BY AUTOMATED COUNT: 40.3 FL (ref 35–45)
HCT VFR BLD CALC: 39.3 % (ref 37–47)
HEMOGLOBIN: 13.1 GM/DL (ref 12–16)
IMMATURE GRANS (ABS): 0.01 THOU/MM3 (ref 0–0.07)
IMMATURE GRANULOCYTES: 0.2 %
LYMPHOCYTES # BLD: 27.7 %
LYMPHOCYTES ABSOLUTE: 1.8 THOU/MM3 (ref 1–4.8)
MCH RBC QN AUTO: 27.2 PG (ref 26–33)
MCHC RBC AUTO-ENTMCNC: 33.3 GM/DL (ref 32.2–35.5)
MCV RBC AUTO: 81.5 FL (ref 81–99)
MONOCYTES # BLD: 6.4 %
MONOCYTES ABSOLUTE: 0.4 THOU/MM3 (ref 0.4–1.3)
NUCLEATED RED BLOOD CELLS: 0 /100 WBC
PLATELET # BLD: 283 THOU/MM3 (ref 130–400)
PMV BLD AUTO: 10.2 FL (ref 9.4–12.4)
RBC # BLD: 4.82 MILL/MM3 (ref 4.2–5.4)
SEG NEUTROPHILS: 62.1 %
SEGMENTED NEUTROPHILS ABSOLUTE COUNT: 4.1 THOU/MM3 (ref 1.8–7.7)
WBC # BLD: 6.6 THOU/MM3 (ref 4.8–10.8)

## 2019-04-30 PROCEDURE — 36415 COLL VENOUS BLD VENIPUNCTURE: CPT

## 2019-04-30 PROCEDURE — 71046 X-RAY EXAM CHEST 2 VIEWS: CPT

## 2019-04-30 PROCEDURE — 85025 COMPLETE CBC W/AUTO DIFF WBC: CPT

## 2019-05-06 ENCOUNTER — TELEPHONE (OUTPATIENT)
Dept: ENT CLINIC | Age: 29
End: 2019-05-06

## 2019-05-06 NOTE — TELEPHONE ENCOUNTER
Called patient back. Informed her chest X-ray was normal, no acute findings. Patient verbalized understanding and thanked me.

## 2019-05-07 NOTE — PROGRESS NOTES
PAT call attempted patient unavailable left message with instructions    NPO after midnight  Bring insurance info and drivers license  Wear comfortable clean clothing  Do not bring jewelry   Shower night before and morning of surgery with a liquid antibacterial soap  Bring medications in original bottles  Follow all instructions give by your physician   needed at discharge  Call [phone] for any questions

## 2019-05-09 ENCOUNTER — TELEPHONE (OUTPATIENT)
Dept: ENT CLINIC | Age: 29
End: 2019-05-09

## 2019-05-09 NOTE — TELEPHONE ENCOUNTER
Patient called she is due to have an excision of thyroglossal duct cyst removed on 5/14/19. She is starting to have a sore throat, runny nose, denies fever. She wanted to know what she could take for her symptoms. Please advise.

## 2019-05-09 NOTE — TELEPHONE ENCOUNTER
Patient returned our call. Informed her she needs to be seen by her PCP to evaluate her symptoms and get medication for them. Patient verbalized understanding and thanked me.

## 2019-05-14 ENCOUNTER — ANESTHESIA EVENT (OUTPATIENT)
Dept: OPERATING ROOM | Age: 29
End: 2019-05-14
Payer: MEDICARE

## 2019-05-14 ENCOUNTER — HOSPITAL ENCOUNTER (OUTPATIENT)
Age: 29
Discharge: HOME | End: 2019-05-15
Attending: OTOLARYNGOLOGY | Admitting: OTOLARYNGOLOGY
Payer: MEDICARE

## 2019-05-14 ENCOUNTER — ANESTHESIA (OUTPATIENT)
Dept: OPERATING ROOM | Age: 29
End: 2019-05-14
Payer: MEDICARE

## 2019-05-14 VITALS
RESPIRATION RATE: 17 BRPM | SYSTOLIC BLOOD PRESSURE: 140 MMHG | DIASTOLIC BLOOD PRESSURE: 68 MMHG | OXYGEN SATURATION: 100 % | TEMPERATURE: 67.3 F

## 2019-05-14 DIAGNOSIS — Q89.2 THYROGLOSSAL DUCT CYST: Primary | ICD-10-CM

## 2019-05-14 LAB — PREGNANCY, URINE: NEGATIVE

## 2019-05-14 PROCEDURE — 3700000000 HC ANESTHESIA ATTENDED CARE: Performed by: OTOLARYNGOLOGY

## 2019-05-14 PROCEDURE — 2580000003 HC RX 258: Performed by: OTOLARYNGOLOGY

## 2019-05-14 PROCEDURE — 2709999900 HC NON-CHARGEABLE SUPPLY

## 2019-05-14 PROCEDURE — 6370000000 HC RX 637 (ALT 250 FOR IP): Performed by: OTOLARYNGOLOGY

## 2019-05-14 PROCEDURE — 6360000002 HC RX W HCPCS: Performed by: OTOLARYNGOLOGY

## 2019-05-14 PROCEDURE — 2709999900 HC NON-CHARGEABLE SUPPLY: Performed by: OTOLARYNGOLOGY

## 2019-05-14 PROCEDURE — 3600000003 HC SURGERY LEVEL 3 BASE: Performed by: OTOLARYNGOLOGY

## 2019-05-14 PROCEDURE — 88305 TISSUE EXAM BY PATHOLOGIST: CPT

## 2019-05-14 PROCEDURE — 7100000001 HC PACU RECOVERY - ADDTL 15 MIN: Performed by: OTOLARYNGOLOGY

## 2019-05-14 PROCEDURE — 2500000003 HC RX 250 WO HCPCS: Performed by: NURSE ANESTHETIST, CERTIFIED REGISTERED

## 2019-05-14 PROCEDURE — 2780000010 HC IMPLANT OTHER: Performed by: OTOLARYNGOLOGY

## 2019-05-14 PROCEDURE — 81025 URINE PREGNANCY TEST: CPT

## 2019-05-14 PROCEDURE — 3600000013 HC SURGERY LEVEL 3 ADDTL 15MIN: Performed by: OTOLARYNGOLOGY

## 2019-05-14 PROCEDURE — 60280 REMOVE THYROID DUCT LESION: CPT | Performed by: OTOLARYNGOLOGY

## 2019-05-14 PROCEDURE — 7100000000 HC PACU RECOVERY - FIRST 15 MIN: Performed by: OTOLARYNGOLOGY

## 2019-05-14 PROCEDURE — 2500000003 HC RX 250 WO HCPCS: Performed by: OTOLARYNGOLOGY

## 2019-05-14 PROCEDURE — APPNB15 APP NON BILLABLE TIME 0-15 MINS: Performed by: PHYSICIAN ASSISTANT

## 2019-05-14 PROCEDURE — 3700000001 HC ADD 15 MINUTES (ANESTHESIA): Performed by: OTOLARYNGOLOGY

## 2019-05-14 PROCEDURE — 96360 HYDRATION IV INFUSION INIT: CPT

## 2019-05-14 PROCEDURE — 96361 HYDRATE IV INFUSION ADD-ON: CPT

## 2019-05-14 PROCEDURE — 6360000002 HC RX W HCPCS: Performed by: NURSE ANESTHETIST, CERTIFIED REGISTERED

## 2019-05-14 DEVICE — CLIP LIG SM TI 6 BLU HNDL FOR OPN AND ENDOSCP SGL APPL: Type: IMPLANTABLE DEVICE | Status: FUNCTIONAL

## 2019-05-14 DEVICE — CLIP LIG M TI 6 SIL HNDL FOR OPN AND ENDOSCP SGL APPL: Type: IMPLANTABLE DEVICE | Status: FUNCTIONAL

## 2019-05-14 DEVICE — SEALANT HEMSTAT 5ML HUM FIBRIN THROM 2 VI APPL DEV EVICEL: Type: IMPLANTABLE DEVICE | Status: FUNCTIONAL

## 2019-05-14 RX ORDER — GLYCOPYRROLATE 1 MG/5 ML
SYRINGE (ML) INTRAVENOUS PRN
Status: DISCONTINUED | OUTPATIENT
Start: 2019-05-14 | End: 2019-05-14 | Stop reason: SDUPTHER

## 2019-05-14 RX ORDER — ONDANSETRON 2 MG/ML
4 INJECTION INTRAMUSCULAR; INTRAVENOUS EVERY 6 HOURS PRN
Status: DISCONTINUED | OUTPATIENT
Start: 2019-05-14 | End: 2019-05-15 | Stop reason: HOSPADM

## 2019-05-14 RX ORDER — NEOSTIGMINE METHYLSULFATE 5 MG/5 ML
SYRINGE (ML) INTRAVENOUS PRN
Status: DISCONTINUED | OUTPATIENT
Start: 2019-05-14 | End: 2019-05-14 | Stop reason: SDUPTHER

## 2019-05-14 RX ORDER — FENTANYL CITRATE 50 UG/ML
50 INJECTION, SOLUTION INTRAMUSCULAR; INTRAVENOUS EVERY 5 MIN PRN
Status: DISCONTINUED | OUTPATIENT
Start: 2019-05-14 | End: 2019-05-14 | Stop reason: HOSPADM

## 2019-05-14 RX ORDER — DEXAMETHASONE SODIUM PHOSPHATE 4 MG/ML
INJECTION, SOLUTION INTRA-ARTICULAR; INTRALESIONAL; INTRAMUSCULAR; INTRAVENOUS; SOFT TISSUE PRN
Status: DISCONTINUED | OUTPATIENT
Start: 2019-05-14 | End: 2019-05-14 | Stop reason: SDUPTHER

## 2019-05-14 RX ORDER — PROPOFOL 10 MG/ML
INJECTION, EMULSION INTRAVENOUS PRN
Status: DISCONTINUED | OUTPATIENT
Start: 2019-05-14 | End: 2019-05-14 | Stop reason: SDUPTHER

## 2019-05-14 RX ORDER — HYDROCODONE BITARTRATE AND ACETAMINOPHEN 5; 325 MG/1; MG/1
2 TABLET ORAL EVERY 4 HOURS PRN
Status: DISCONTINUED | OUTPATIENT
Start: 2019-05-14 | End: 2019-05-15 | Stop reason: HOSPADM

## 2019-05-14 RX ORDER — LABETALOL HYDROCHLORIDE 5 MG/ML
10 INJECTION, SOLUTION INTRAVENOUS EVERY 10 MIN PRN
Status: DISCONTINUED | OUTPATIENT
Start: 2019-05-14 | End: 2019-05-14 | Stop reason: HOSPADM

## 2019-05-14 RX ORDER — HYDROCODONE BITARTRATE AND ACETAMINOPHEN 5; 325 MG/1; MG/1
1 TABLET ORAL EVERY 4 HOURS PRN
Status: DISCONTINUED | OUTPATIENT
Start: 2019-05-14 | End: 2019-05-15 | Stop reason: HOSPADM

## 2019-05-14 RX ORDER — SUCCINYLCHOLINE/SOD CL,ISO/PF 200MG/10ML
SYRINGE (ML) INTRAVENOUS PRN
Status: DISCONTINUED | OUTPATIENT
Start: 2019-05-14 | End: 2019-05-14 | Stop reason: SDUPTHER

## 2019-05-14 RX ORDER — DEXTROSE AND SODIUM CHLORIDE 5; .45 G/100ML; G/100ML
INJECTION, SOLUTION INTRAVENOUS CONTINUOUS
Status: DISCONTINUED | OUTPATIENT
Start: 2019-05-14 | End: 2019-05-15 | Stop reason: HOSPADM

## 2019-05-14 RX ORDER — ROCURONIUM BROMIDE 10 MG/ML
INJECTION, SOLUTION INTRAVENOUS PRN
Status: DISCONTINUED | OUTPATIENT
Start: 2019-05-14 | End: 2019-05-14 | Stop reason: SDUPTHER

## 2019-05-14 RX ORDER — SODIUM CHLORIDE 0.9 % (FLUSH) 0.9 %
10 SYRINGE (ML) INJECTION PRN
Status: DISCONTINUED | OUTPATIENT
Start: 2019-05-14 | End: 2019-05-15 | Stop reason: HOSPADM

## 2019-05-14 RX ORDER — SODIUM CHLORIDE 9 MG/ML
INJECTION, SOLUTION INTRAVENOUS CONTINUOUS
Status: DISCONTINUED | OUTPATIENT
Start: 2019-05-14 | End: 2019-05-14

## 2019-05-14 RX ORDER — SODIUM CHLORIDE 0.9 % (FLUSH) 0.9 %
10 SYRINGE (ML) INJECTION EVERY 12 HOURS SCHEDULED
Status: DISCONTINUED | OUTPATIENT
Start: 2019-05-14 | End: 2019-05-15 | Stop reason: HOSPADM

## 2019-05-14 RX ORDER — ONDANSETRON 2 MG/ML
INJECTION INTRAMUSCULAR; INTRAVENOUS PRN
Status: DISCONTINUED | OUTPATIENT
Start: 2019-05-14 | End: 2019-05-14 | Stop reason: SDUPTHER

## 2019-05-14 RX ORDER — CEFAZOLIN SODIUM 1 G/3ML
INJECTION, POWDER, FOR SOLUTION INTRAMUSCULAR; INTRAVENOUS PRN
Status: DISCONTINUED | OUTPATIENT
Start: 2019-05-14 | End: 2019-05-14 | Stop reason: SDUPTHER

## 2019-05-14 RX ORDER — FENTANYL CITRATE 50 UG/ML
25 INJECTION, SOLUTION INTRAMUSCULAR; INTRAVENOUS EVERY 5 MIN PRN
Status: DISCONTINUED | OUTPATIENT
Start: 2019-05-14 | End: 2019-05-14 | Stop reason: HOSPADM

## 2019-05-14 RX ORDER — LIDOCAINE HYDROCHLORIDE 20 MG/ML
INJECTION, SOLUTION INTRAVENOUS PRN
Status: DISCONTINUED | OUTPATIENT
Start: 2019-05-14 | End: 2019-05-14 | Stop reason: SDUPTHER

## 2019-05-14 RX ORDER — FENTANYL CITRATE 50 UG/ML
INJECTION, SOLUTION INTRAMUSCULAR; INTRAVENOUS PRN
Status: DISCONTINUED | OUTPATIENT
Start: 2019-05-14 | End: 2019-05-14 | Stop reason: SDUPTHER

## 2019-05-14 RX ORDER — MIDAZOLAM HYDROCHLORIDE 1 MG/ML
INJECTION INTRAMUSCULAR; INTRAVENOUS PRN
Status: DISCONTINUED | OUTPATIENT
Start: 2019-05-14 | End: 2019-05-14 | Stop reason: SDUPTHER

## 2019-05-14 RX ORDER — PROMETHAZINE HYDROCHLORIDE 25 MG/ML
12.5 INJECTION, SOLUTION INTRAMUSCULAR; INTRAVENOUS
Status: DISCONTINUED | OUTPATIENT
Start: 2019-05-14 | End: 2019-05-14 | Stop reason: HOSPADM

## 2019-05-14 RX ORDER — DOCUSATE SODIUM 100 MG/1
100 CAPSULE, LIQUID FILLED ORAL 2 TIMES DAILY
Status: DISCONTINUED | OUTPATIENT
Start: 2019-05-14 | End: 2019-05-15 | Stop reason: HOSPADM

## 2019-05-14 RX ADMIN — CEFAZOLIN 2000 MG: 1 INJECTION, POWDER, FOR SOLUTION INTRAMUSCULAR; INTRAVENOUS; PARENTERAL at 10:00

## 2019-05-14 RX ADMIN — Medication 140 MG: at 09:45

## 2019-05-14 RX ADMIN — DOCUSATE SODIUM 100 MG: 100 CAPSULE, LIQUID FILLED ORAL at 20:04

## 2019-05-14 RX ADMIN — FENTANYL CITRATE 50 MCG: 50 INJECTION INTRAMUSCULAR; INTRAVENOUS at 11:37

## 2019-05-14 RX ADMIN — FENTANYL CITRATE 100 MCG: 50 INJECTION INTRAMUSCULAR; INTRAVENOUS at 09:45

## 2019-05-14 RX ADMIN — DEXTROSE AND SODIUM CHLORIDE: 5; 450 INJECTION, SOLUTION INTRAVENOUS at 20:13

## 2019-05-14 RX ADMIN — ONDANSETRON 4 MG: 2 INJECTION INTRAMUSCULAR; INTRAVENOUS at 20:04

## 2019-05-14 RX ADMIN — AMPICILLIN SODIUM AND SULBACTAM SODIUM 3 G: 2; 1 INJECTION, POWDER, FOR SOLUTION INTRAMUSCULAR; INTRAVENOUS at 20:04

## 2019-05-14 RX ADMIN — SODIUM CHLORIDE: 9 INJECTION, SOLUTION INTRAVENOUS at 08:27

## 2019-05-14 RX ADMIN — DEXAMETHASONE SODIUM PHOSPHATE 10 MG: 4 INJECTION, SOLUTION INTRAMUSCULAR; INTRAVENOUS at 10:01

## 2019-05-14 RX ADMIN — PROPOFOL 150 MG: 10 INJECTION, EMULSION INTRAVENOUS at 09:45

## 2019-05-14 RX ADMIN — Medication 3 MG: at 12:52

## 2019-05-14 RX ADMIN — DEXTROSE AND SODIUM CHLORIDE: 5; 450 INJECTION, SOLUTION INTRAVENOUS at 14:01

## 2019-05-14 RX ADMIN — ONDANSETRON HYDROCHLORIDE 4 MG: 4 INJECTION, SOLUTION INTRAMUSCULAR; INTRAVENOUS at 12:42

## 2019-05-14 RX ADMIN — SODIUM CHLORIDE: 9 INJECTION, SOLUTION INTRAVENOUS at 13:05

## 2019-05-14 RX ADMIN — ROCURONIUM BROMIDE 40 MG: 10 INJECTION INTRAVENOUS at 09:55

## 2019-05-14 RX ADMIN — HYDROCODONE BITARTRATE AND ACETAMINOPHEN 2 TABLET: 5; 325 TABLET ORAL at 20:04

## 2019-05-14 RX ADMIN — LIDOCAINE HYDROCHLORIDE 80 MG: 20 INJECTION, SOLUTION INTRAVENOUS at 09:45

## 2019-05-14 RX ADMIN — Medication 0.4 MG: at 12:52

## 2019-05-14 RX ADMIN — FENTANYL CITRATE 50 MCG: 50 INJECTION INTRAMUSCULAR; INTRAVENOUS at 11:45

## 2019-05-14 RX ADMIN — FENTANYL CITRATE 50 MCG: 50 INJECTION INTRAMUSCULAR; INTRAVENOUS at 11:19

## 2019-05-14 RX ADMIN — HYDROCODONE BITARTRATE AND ACETAMINOPHEN 2 TABLET: 5; 325 TABLET ORAL at 14:01

## 2019-05-14 RX ADMIN — MIDAZOLAM HYDROCHLORIDE 2 MG: 1 INJECTION, SOLUTION INTRAMUSCULAR; INTRAVENOUS at 09:43

## 2019-05-14 ASSESSMENT — ENCOUNTER SYMPTOMS
SHORTNESS OF BREATH: 0
VOMITING: 0
NAUSEA: 0
RHINORRHEA: 0
TROUBLE SWALLOWING: 1
SORE THROAT: 0
EYE ITCHING: 0
STRIDOR: 0
VOICE CHANGE: 0
SINUS PRESSURE: 0
COLOR CHANGE: 0
FACIAL SWELLING: 0
COUGH: 0
SINUS PAIN: 0

## 2019-05-14 ASSESSMENT — PULMONARY FUNCTION TESTS
PIF_VALUE: 25
PIF_VALUE: 24
PIF_VALUE: 28
PIF_VALUE: 25
PIF_VALUE: 26
PIF_VALUE: 24
PIF_VALUE: 24
PIF_VALUE: 25
PIF_VALUE: 26
PIF_VALUE: 25
PIF_VALUE: 24
PIF_VALUE: 24
PIF_VALUE: 25
PIF_VALUE: 24
PIF_VALUE: 24
PIF_VALUE: 28
PIF_VALUE: 23
PIF_VALUE: 25
PIF_VALUE: 2
PIF_VALUE: 25
PIF_VALUE: 24
PIF_VALUE: 24
PIF_VALUE: 25
PIF_VALUE: 24
PIF_VALUE: 25
PIF_VALUE: 26
PIF_VALUE: 32
PIF_VALUE: 25
PIF_VALUE: 2
PIF_VALUE: 24
PIF_VALUE: 23
PIF_VALUE: 23
PIF_VALUE: 5
PIF_VALUE: 24
PIF_VALUE: 25
PIF_VALUE: 27
PIF_VALUE: 27
PIF_VALUE: 28
PIF_VALUE: 25
PIF_VALUE: 25
PIF_VALUE: 24
PIF_VALUE: 24
PIF_VALUE: 26
PIF_VALUE: 1
PIF_VALUE: 25
PIF_VALUE: 26
PIF_VALUE: 22
PIF_VALUE: 23
PIF_VALUE: 24
PIF_VALUE: 24
PIF_VALUE: 32
PIF_VALUE: 24
PIF_VALUE: 27
PIF_VALUE: 24
PIF_VALUE: 25
PIF_VALUE: 24
PIF_VALUE: 24
PIF_VALUE: 23
PIF_VALUE: 25
PIF_VALUE: 23
PIF_VALUE: 25
PIF_VALUE: 24
PIF_VALUE: 29
PIF_VALUE: 24
PIF_VALUE: 0
PIF_VALUE: 26
PIF_VALUE: 26
PIF_VALUE: 29
PIF_VALUE: 24
PIF_VALUE: 27
PIF_VALUE: 24
PIF_VALUE: 25
PIF_VALUE: 27
PIF_VALUE: 24
PIF_VALUE: 25
PIF_VALUE: 25
PIF_VALUE: 23
PIF_VALUE: 29
PIF_VALUE: 24
PIF_VALUE: 23
PIF_VALUE: 24
PIF_VALUE: 24
PIF_VALUE: 33
PIF_VALUE: 26
PIF_VALUE: 25
PIF_VALUE: 24
PIF_VALUE: 32
PIF_VALUE: 31
PIF_VALUE: 24
PIF_VALUE: 27
PIF_VALUE: 23
PIF_VALUE: 30
PIF_VALUE: 24
PIF_VALUE: 20
PIF_VALUE: 24
PIF_VALUE: 23
PIF_VALUE: 26
PIF_VALUE: 24
PIF_VALUE: 25
PIF_VALUE: 24
PIF_VALUE: 25
PIF_VALUE: 32
PIF_VALUE: 24
PIF_VALUE: 25
PIF_VALUE: 32
PIF_VALUE: 25
PIF_VALUE: 25
PIF_VALUE: 24
PIF_VALUE: 24
PIF_VALUE: 31
PIF_VALUE: 24
PIF_VALUE: 26
PIF_VALUE: 23
PIF_VALUE: 25
PIF_VALUE: 33
PIF_VALUE: 6
PIF_VALUE: 24
PIF_VALUE: 27
PIF_VALUE: 29
PIF_VALUE: 24
PIF_VALUE: 25
PIF_VALUE: 24
PIF_VALUE: 28
PIF_VALUE: 23
PIF_VALUE: 25
PIF_VALUE: 1
PIF_VALUE: 28
PIF_VALUE: 27
PIF_VALUE: 24
PIF_VALUE: 23
PIF_VALUE: 25
PIF_VALUE: 27
PIF_VALUE: 30
PIF_VALUE: 25
PIF_VALUE: 26
PIF_VALUE: 22
PIF_VALUE: 24
PIF_VALUE: 25
PIF_VALUE: 30
PIF_VALUE: 25
PIF_VALUE: 24
PIF_VALUE: 20
PIF_VALUE: 24
PIF_VALUE: 27
PIF_VALUE: 29
PIF_VALUE: 27
PIF_VALUE: 20
PIF_VALUE: 34
PIF_VALUE: 27
PIF_VALUE: 24
PIF_VALUE: 24
PIF_VALUE: 40
PIF_VALUE: 7
PIF_VALUE: 27
PIF_VALUE: 27
PIF_VALUE: 23
PIF_VALUE: 24
PIF_VALUE: 23
PIF_VALUE: 24
PIF_VALUE: 27
PIF_VALUE: 25
PIF_VALUE: 24
PIF_VALUE: 23
PIF_VALUE: 28
PIF_VALUE: 28
PIF_VALUE: 27
PIF_VALUE: 25
PIF_VALUE: 0
PIF_VALUE: 24
PIF_VALUE: 24
PIF_VALUE: 27
PIF_VALUE: 5
PIF_VALUE: 27
PIF_VALUE: 2
PIF_VALUE: 25
PIF_VALUE: 25

## 2019-05-14 ASSESSMENT — PAIN SCALES - GENERAL
PAINLEVEL_OUTOF10: 0
PAINLEVEL_OUTOF10: 7
PAINLEVEL_OUTOF10: 5
PAINLEVEL_OUTOF10: 8
PAINLEVEL_OUTOF10: 0
PAINLEVEL_OUTOF10: 7
PAINLEVEL_OUTOF10: 3
PAINLEVEL_OUTOF10: 4

## 2019-05-14 ASSESSMENT — PAIN DESCRIPTION - PAIN TYPE
TYPE: SURGICAL PAIN
TYPE: SURGICAL PAIN

## 2019-05-14 ASSESSMENT — PAIN DESCRIPTION - LOCATION
LOCATION: NECK
LOCATION: NECK

## 2019-05-14 ASSESSMENT — PAIN DESCRIPTION - ORIENTATION
ORIENTATION: ANTERIOR
ORIENTATION: ANTERIOR

## 2019-05-14 NOTE — ANESTHESIA PRE PROCEDURE
Department of Anesthesiology  Preprocedure Note       Name:  Violet Dickens   Age:  33 y.o.  :  1990                                          MRN:  [id number]         Date:  2019      Surgeon: Vern Arevalo):  Remington Loredo MD    Procedure: EXCISION OF THYROGLOSSAL DUCT OR SINUS (N/A )    Medications prior to admission:   Prior to Admission medications    Medication Sig Start Date End Date Taking? Authorizing Provider   albuterol (ACCUNEB) 0.63 MG/3ML nebulizer solution Take 1 ampule by nebulization every 6 hours as needed for Wheezing    Historical Provider, MD   albuterol sulfate  (90 Base) MCG/ACT inhaler Inhale 2 puffs into the lungs every 6 hours as needed for Wheezing    Historical Provider, MD       Current medications:    Current Facility-Administered Medications   Medication Dose Route Frequency Provider Last Rate Last Dose    0.9 % sodium chloride infusion   Intravenous Continuous Remington Loredo MD           Allergies: Allergies   Allergen Reactions    Rocephin [Ceftriaxone] Anaphylaxis    Toradol [Ketorolac Tromethamine] Hives       Problem List:    Patient Active Problem List   Diagnosis Code    Asthma J45.909    Nicotine dependence F17.200       Past Medical History:        Diagnosis Date    Asthma     Nicotine dependence        Past Surgical History:        Procedure Laterality Date     SECTION      x3       Social History:    Social History     Tobacco Use    Smoking status: Former Smoker     Packs/day: 0.50     Years: 9.00     Pack years: 4.50     Types: Cigarettes     Last attempt to quit: 2019     Years since quittin.2    Smokeless tobacco: Never Used   Substance Use Topics    Alcohol use:  No                                Counseling given: Not Answered      Vital Signs (Current):   Vitals:    19 0750 19 0755   BP: 118/71    Pulse: 70    Resp: 18    Temp: 97.8 °F (36.6 °C)    TempSrc: Temporal    SpO2: 99%    Weight: 253 lb 9.6 oz (115 kg) Height:  5' 9\" (1.753 m)                                              BP Readings from Last 3 Encounters:   05/14/19 118/71   04/15/19 128/80   03/14/19 118/70       NPO Status: Time of last liquid consumption: 2300                        Time of last solid consumption: 2300                        Date of last liquid consumption: 05/13/19                        Date of last solid food consumption: 05/13/19    BMI:   Wt Readings from Last 3 Encounters:   05/14/19 253 lb 9.6 oz (115 kg)   04/15/19 245 lb 4.8 oz (111.3 kg)   03/14/19 239 lb (108.4 kg)     Body mass index is 37.45 kg/m². CBC:   Lab Results   Component Value Date    WBC 6.6 04/30/2019    RBC 4.82 04/30/2019    HGB 13.1 04/30/2019    HCT 39.3 04/30/2019    MCV 81.5 04/30/2019    RDW 13.5 06/02/2018     04/30/2019       CMP:   Lab Results   Component Value Date     02/07/2019    K 4.1 02/07/2019     02/07/2019    CO2 25 02/07/2019    BUN 8 02/07/2019    CREATININE 0.7 02/07/2019    LABGLOM >90 02/07/2019    GLUCOSE 93 02/07/2019    GLUCOSE 85 03/21/2017    PROT 6.3 02/07/2019    CALCIUM 8.7 02/07/2019    BILITOT <0.2 02/07/2019    ALKPHOS 99 02/07/2019    AST 27 02/07/2019    ALT 27 02/07/2019       POC Tests: No results for input(s): POCGLU, POCNA, POCK, POCCL, POCBUN, POCHEMO, POCHCT in the last 72 hours.     Coags: No results found for: PROTIME, INR, APTT    HCG (If Applicable):   Lab Results   Component Value Date    PREGTESTUR NEGATIVE 12/23/2017    PREGSERUM Negative 03/21/2017        ABGs: No results found for: PHART, PO2ART, NDB6NMK, ZQE5WYC, BEART, [id number]     Type & Screen (If Applicable):  Lab Results   Component Value Date    [address] 06/02/2018       Anesthesia Evaluation  Patient summary reviewed  Airway: Mallampati: III  TM distance: >3 FB   Neck ROM: full  Mouth opening: > = 3 FB Dental:          Pulmonary:   (+) asthma:                            Cardiovascular:                      Neuro/Psych:

## 2019-05-14 NOTE — H&P
[address] [place], [facility]  [address] [address]  Dept: [phone]  Dept Fax: [phone]  Loc: [phone]    Lauren Storm is a 33 y.o. female who was referred byNo ref. provider found for:  No chief complaint on file. Zuleika Roberts HPI:   INITIAL HPI:  Lauren Storm is a 33 y.o. female who is 6 weeks post-partum, who presents today for evaluation of infected thyroglossal duct cyst.  She had anterior neck swelling following a URI/sore throat and had gone to the ER. Neck US 2/28/19 revealed cystic mass most consistent with thyroglossal duct cyst.  This is the first time she had this type of swelling. No F/C, N/V. She denies other cervical lymphadenopathy. No issues with swallowing or breathing. She does report globus sensation. Denies history of GERD. No unintentional weight loss. She finished a 14 day course of clindamycin recently. She noted that her neck swelling improved after abx, but she is not yet back to baseline. INTERVAL HPI:  She returns 1 month after infected thyroglossal duct. She reports no new issues since last visit. She finished her round of antibiotic. She is here to discuss thyroglossal duct cyst removal.    History:      Allergies   Allergen Reactions    Rocephin [Ceftriaxone] Anaphylaxis    Toradol [Ketorolac Tromethamine] Hives     Current Facility-Administered Medications   Medication Dose Route Frequency Provider Last Rate Last Dose    0.9 % sodium chloride infusion   Intravenous Continuous Lelo Fermin  mL/hr at 05/14/19 0827      promethazine (PHENERGAN) injection 12.5 mg  12.5 mg Intramuscular Once PRN Orrie Spindle, DO        labetalol (NORMODYNE;TRANDATE) injection 10 mg  10 mg Intravenous Q10 Min PRN Orrie Spindle, DO        fentaNYL (SUBLIMAZE) injection 25 mcg  25 mcg Intravenous Q5 Min PRN Orrie Spindle, DO        fentaNYL (SUBLIMAZE) injection 50 mcg  50 mcg Intravenous Q5 intact. Middle ear aerated. Left Ear: External ear normal. Ear canal clear. Tympanic membrane intact. Middle ear aerated. Nose: External nose normal.  Nasal mucosa normal.  No lesions noted. Mouth/Throat: Fair dentition. Oral cavity mucosa normal, no masses or lesions noted. Oropharynx is clear and moist.   Eyes: Pupils are equal, round, and reactive to light. Conjunctivae and EOM are normal.   Neck: Normal range of motion. Neck supple. No JVD present. No tracheal deviation present. No thyromegaly present. There is a 2x2 cm anterior neck mass, soft, non-tender, non-fluctuant. It elevates with tongue protrusion. Cardiovascular: Normal rate. Pulmonary/Chest: Effort normal. No stridor or stertor. No respiratory distress. Musculoskeletal: Normal range of motion. He exhibits no edema or Lymphadenopathy. Neurological: He is alert and oriented to person, place, and time. Cranial nerve II-XII grossly intact. Skin: Skin is warm. No erythema. Psychiatric: Normal mood and affect. Behavior is normal.   Vitals reviewed. Data:  All of the past medical history, past surgical history, family history,social history, allergies and current medications were reviewed with the patient. 3/27/19: Neck CT    1. 2.3 cm cystic-appearing infrahyoid mass most consistent with a thyroglossal duct cyst.   2. 5 mm right thyroid nodule. Assessment & Plan   Diagnoses and all orders for this visit:     Diagnosis Orders   1. Thyroglossal cyst  CO EXCIS THYROID DUCT CYST/SINUS     76-year-old female with anterior neck mass likely thyroglossal duct cyst.  She finished a round of antibiotics and has no signs of active infection. I discussed the risk for surgery which occludes pain, infection bleeding, scar from midline neck incision. We also discussed possibility of recurrence and need for further surgery. She will likely have a drain in her neck immediately after surgery.   She does report some dysphagia and globus sensation and I mentioned that this may not improve after surgery. She understood the risks and wished to proceed. The findings were explained and her questions were answered. No follow-ups on file. Eddy Melvin MD    **This report has been created using voice recognition software. It may contain minor errors which are inherent in voice recognition technology. **    Pt Name: Lorenzo Pettit  MRN: [id number]  YOB: 1990  Date of evaluation: 5/14/2019    I have examined the patient and reviewed the H&P/Consult and there are no changes to the patient or plans.          Electronically signed by Eddy Melvin MD on 5/14/2019 at 9:18 AM

## 2019-05-14 NOTE — BRIEF OP NOTE
Brief Postoperative Note  ______________________________________________________________    Patient: Lauren Storm  YOB: 1990  MRN: [id number]  Date of Procedure: 5/14/2019    Pre-Op Diagnosis: THYROGLOSSAL CYST    Post-Op Diagnosis: Same       Procedure(s):  EXCISION OF THYROGLOSSAL DUCT OR SINUS (Sistrunk Procedure)     Anesthesia: General    Surgeon(s):  Lelo Fermin MD    Assistant: None    Estimated Blood Loss (mL): less than 50     Complications: None    Specimens:   ID Type Source Tests Collected by Time Destination   A : thyroglossal duct cyst Tissue Thyroid SURGICAL PATHOLOGY Lelo Fermin MD 5/14/2019 1101        Implants:  * No implants in log *      Drains:   Closed/Suction Drain Left; Anterior Neck Bulb (Active)       Findings: 3 x 2 cm thyroglossal duct cyst    Lelo Fermin MD  Date: 5/14/2019  Time: 1:01 PM

## 2019-05-14 NOTE — ANESTHESIA POSTPROCEDURE EVALUATION
Department of Anesthesiology  Postprocedure Note    Patient: Vianca Pruitt  MRN: [id number]  YOB: 1990  Date of evaluation: 5/14/2019  Time:  3:15 PM     Procedure Summary     Date:  05/14/19 Room / Location:  [address] [place], [place] [address]    Anesthesia Start:  8683 Anesthesia Stop:  0702    Procedure:  EXCISION OF THYROGLOSSAL DUCT OR SINUS (N/A ) Diagnosis:  (THYROGLOSSAL CYST)    Surgeon:  Payam Archuleta MD Responsible Provider:  Deshawn Parks DO    Anesthesia Type:  general ASA Status:  2          Anesthesia Type: general    Katharine Phase I: Katharine Score: 10    Katharine Phase II:      Last vitals: Reviewed and per EMR flowsheets. Anesthesia Post Evaluation    Comments: Kike Mclaughlin 60  POST-ANESTHESIA NOTE       Name:  Vianca Pruitt                                         Age:  33 y.o. MRN:  [id number]      Last Vitals:  /65   Pulse 72   Temp 98.1 °F (36.7 °C) (Oral)   Resp 16   Ht 5' 9\" (1.753 m)   Wt 253 lb 9.6 oz (115 kg)   SpO2 98%   BMI 37.45 kg/m²   Patient Vitals in the past 4 hrs:  05/14/19 1345, BP:124/65, Temp:98.1 °F (36.7 °C), Temp src:Oral, Pulse:72, Resp:16, SpO2:98 %  05/14/19 1335, Pulse:71, Resp:16, SpO2:98 %  05/14/19 1330, BP:117/66, Pulse:71, Resp:19, SpO2:98 %  05/14/19 1325, BP:117/74, Pulse:83, Resp:11, SpO2:97 %  05/14/19 1320, BP:113/67, Pulse:71, Resp:17, SpO2:99 %  05/14/19 1315, BP:119/66, Pulse:79, Resp:20, SpO2:100 %  05/14/19 1310, Pulse:73, Resp:16, SpO2:100 %  05/14/19 1303, BP:120/66, Temp:97.6 °F (36.4 °C), Temp src:Temporal, Pulse:74, Resp:20, SpO2:100 %    Level of Consciousness:  Awake    Respiratory:  Stable    Oxygen Saturation:  Stable    Cardiovascular:  Stable    Hydration:  Adequate    PONV:  Stable    Post-op Pain:  Adequate analgesia    Post-op Assessment:  No apparent anesthetic complications    Additional Follow-Up / Treatment / Comment:  None    Barb Pederson.  DO Afshin  May 14, 2019   3:15 PM

## 2019-05-14 NOTE — PROGRESS NOTES
Patient admitted to [facility] room 12 with family at bedside. Bed in low position side rails up call light in reach. Patient denies questions at this time.

## 2019-05-14 NOTE — PROGRESS NOTES
Patient arrived to 6E 57 from [facility] via bed with mother present. IV of 0.9 NS @ 125 ml/hr infusing with 200 mls infused and 800 mls left in bag. Oriented mother and patient to unit, room, and plan of care.

## 2019-05-15 VITALS
SYSTOLIC BLOOD PRESSURE: 127 MMHG | HEART RATE: 60 BPM | BODY MASS INDEX: 37.56 KG/M2 | HEIGHT: 69 IN | TEMPERATURE: 98.3 F | DIASTOLIC BLOOD PRESSURE: 90 MMHG | WEIGHT: 253.6 LBS | RESPIRATION RATE: 20 BRPM | OXYGEN SATURATION: 99 %

## 2019-05-15 PROCEDURE — 2709999900 HC NON-CHARGEABLE SUPPLY

## 2019-05-15 PROCEDURE — 6360000002 HC RX W HCPCS: Performed by: OTOLARYNGOLOGY

## 2019-05-15 PROCEDURE — 2580000003 HC RX 258: Performed by: OTOLARYNGOLOGY

## 2019-05-15 PROCEDURE — 6370000000 HC RX 637 (ALT 250 FOR IP): Performed by: OTOLARYNGOLOGY

## 2019-05-15 RX ORDER — PSEUDOEPHEDRINE HCL 30 MG
100 TABLET ORAL 2 TIMES DAILY
Qty: 10 CAPSULE | Refills: 1 | Status: SHIPPED | OUTPATIENT
Start: 2019-05-15 | End: 2019-05-30 | Stop reason: ALTCHOICE

## 2019-05-15 RX ORDER — HYDROCODONE BITARTRATE AND ACETAMINOPHEN 5; 325 MG/1; MG/1
1 TABLET ORAL EVERY 4 HOURS PRN
Qty: 18 TABLET | Refills: 0 | Status: SHIPPED | OUTPATIENT
Start: 2019-05-15 | End: 2019-05-18

## 2019-05-15 RX ORDER — ONDANSETRON 8 MG/1
8 TABLET, ORALLY DISINTEGRATING ORAL EVERY 8 HOURS PRN
Qty: 20 TABLET | Refills: 0 | Status: SHIPPED | OUTPATIENT
Start: 2019-05-15 | End: 2019-05-30 | Stop reason: ALTCHOICE

## 2019-05-15 RX ADMIN — ONDANSETRON 4 MG: 2 INJECTION INTRAMUSCULAR; INTRAVENOUS at 07:38

## 2019-05-15 RX ADMIN — AMPICILLIN SODIUM AND SULBACTAM SODIUM 3 G: 2; 1 INJECTION, POWDER, FOR SOLUTION INTRAMUSCULAR; INTRAVENOUS at 00:45

## 2019-05-15 RX ADMIN — HYDROCODONE BITARTRATE AND ACETAMINOPHEN 2 TABLET: 5; 325 TABLET ORAL at 07:29

## 2019-05-15 RX ADMIN — DOCUSATE SODIUM 100 MG: 100 CAPSULE, LIQUID FILLED ORAL at 11:01

## 2019-05-15 RX ADMIN — DEXTROSE AND SODIUM CHLORIDE: 5; 450 INJECTION, SOLUTION INTRAVENOUS at 06:15

## 2019-05-15 RX ADMIN — AMPICILLIN SODIUM AND SULBACTAM SODIUM 3 G: 2; 1 INJECTION, POWDER, FOR SOLUTION INTRAMUSCULAR; INTRAVENOUS at 06:15

## 2019-05-15 ASSESSMENT — PAIN SCALES - GENERAL
PAINLEVEL_OUTOF10: 8
PAINLEVEL_OUTOF10: 8

## 2019-05-15 ASSESSMENT — PAIN DESCRIPTION - ORIENTATION: ORIENTATION: ANTERIOR

## 2019-05-15 ASSESSMENT — PAIN DESCRIPTION - PAIN TYPE: TYPE: SURGICAL PAIN

## 2019-05-15 ASSESSMENT — PAIN DESCRIPTION - LOCATION: LOCATION: NECK

## 2019-05-15 NOTE — CARE COORDINATION
CASE MANAGEMENT OBSERVATION NOTE       5/15/19, 2:06 PM    Vianca Pruitt       Admitted from: [facility] 5/14/2019/ 4110   Location: [address] [place] [address]- Reason for admit: Thyroglossal duct cyst [Q89.2]  Thyroglossal duct cyst [Q89.2]   Admit order signed?: yes    Procedure: 5/14 - Excision of Thyroglossal duct cyst.     Pertinent Info/Orders/Treatment Plan:  Pt here for elective procedure. Discharged home. PCP: ABHAY Song - CNP    Discharge Plan: Spoke with pt prior to discharge. She lives at home with her daughters. She denied any needs for discharge. 5/15/19, 2:10 PM    Discharge plan discussed by  and . Discharge plan reviewed with patient/ family. Patient/ family verbalize understanding of discharge plan and are in agreement with plan. Understanding was demonstrated using the teach back method.

## 2019-05-15 NOTE — OP NOTE
[address] [place] [address]                                OPERATIVE REPORT    PATIENT NAME: Letha Robert                 :        1990  MED REC NO:   [id number]                           ROOM:       2138  ACCOUNT NO:   [de-identified]                           ADMIT DATE: 2019  PROVIDER:     Craig Fofana MD    DATE OF PROCEDURE:  2019    PREOPERATIVE DIAGNOSIS:  Thyroglossal duct cyst.    POSTOPERATIVE DIAGNOSIS:  Thyroglossal duct cyst.    OPERATION PERFORMED:  Excision of thyroglossal duct cyst (Sistrunk  procedure). ATTENDING SURGEON:  Craig Fofana MD    ASSISTANT SURGEON:  None. ANESTHESIA:  General endotracheal anesthesia. INDICATIONS FOR PROCEDURE:  This is a 76-year-old female who presented  in the ENT clinic with infected thyroglossal duct cyst.  This was the  very first time she had an infection and she had an ultrasound and CT  which is consistent with a thyroglossal duct cyst.  This also caused a  globus sensation since she had that infected. Imaging revealed that  this thyroglossal duct cyst was not an ectopic thyroid. The risks and  benefits were explained to the patient and the patient wished to have  this excised. OPERATIVE FINDINGS:  1.  4 x 2.5cm specimen thyroglossal duct cyst removed with its central  hyoid portion and thyroglossal duct (Sistrunk procedure). 2.  10 Randy SYBIL drain placed in wound bed. OPERATIVE PROCEDURE:  The patient's name and date of birth were verified  in the preoperative area. All her questions were answered and she had  signed the consent. She was brought back to the operating room and  placed supine on the operating table. The patient was then intubated  and induced with general anesthesia successfully by anesthesia team.  We  first began by marking out the proposed incision along her anterior  neck.   A curvilinear incision along her neck crease line was placed  overlying the cyst which was a few centimeters below her hyoid bone. This was approximately 5 cm in length. 1% lidocaine with 1:100,000  epinephrine was then injected. Approximately 3 mL of injectate was  used. The patient was then prepped and draped in the usual fashion for  an excision of a thyroglossal duct cyst.  Prior to this, a shoulder roll  was placed for neck extension. We first began the case by incising the skin and subcutaneous tissue  with a 15 blade. A subplatysmal flap was elevated superiorly past the  hyoid bone and inferiorly for a few centimeters. This then revealed the  overlying strap muscles which were being splayed apart by the  thyroglossal duct cyst.  This was carefully dissected away from the cyst  using combination of sharp and blunt dissection as well as using Bovie  cautery. This was painstakingly dissected off the cyst and the cyst itself was  about 3.5 x 2.5 cm in size. During this dissection, the cyst was  inadvertently punctured and a 3-0 Vicryl was then used to seal off this  hole and the tails of this suture were used in aid of retraction. The  cyst was then dissected off the overlying thyroid cartilage. Superiorly, the hyoid bone was identified. This was skeletonized with a  Bovie and [place] elevator. The strap muscles were released off the  superior and inferior portion of the hyoid bone. The tissue was then  dissected centrally towards where the remnant of the thyroglossal duct  would be located. Sharp bone cutters were then used to cut along both  the left and right side of the hyoid bone to aid in removing the  thyroglossal duct cyst along with its hyoid bone origin component. After this was done, the superior portion of the thyroglossal cyst and  duct were still need to be dissected. A baby Eulalia [place] was then placed  through the oral cavity and placed along the vallecula to push the  tissue anteriorly.   This was then dissected up a few centimeters past  the hyoid bone. When we reached the end of the visible thyroglossal  duct, this was then clamped off with a tonsil clamp and the end was  ligated with a 2-0 silk suture. This was followed by placement of two  medium clips to aid in localization in case of revision surgery. The  specimen was then removed and sent to Pathology. It was approximately  4.5 x 2.5 cm in size. The wound bed was then copiously irrigated. Hemostasis was obtained and Valsalva was performed to ensure hemostasis. A leak test was also performed by having the anesthesiologist aid in  placing half-strength hydrogen peroxide through the mouth to see if  there was any leakage of hydrogen peroxide through the pharynx into the  neck wound. This did not show any leak. A 10-sized Randy round drain was then placed into the wound bed and came  out through a separate incision left of the anterior neck incision. This was connected to a SYBIL drain to self-bulb suction. Surgicel was  then placed into the wound and Evicel tissue glue was then placed in the  wound to aid in hemostasis. We then focused our attention on closing  the wound. 3-0 Vicryls were used to close the platysmal layer and the  subcutaneous tissue. The tissue was then very well approximated and a  Dermabond glue was then used to close the skin portion. This then ended  the surgical portion of the case. The patient was turned back over to  Anesthesia. The patient was then extubated without any issues and  escorted to the PACU in stable condition. All sponge, instruments, and  needle counts were correct. Estimated blood loss was less than 15 mL. IV fluids per Anesthesia. Specimens sent were thyroglossal duct cyst.   There were no complications.         Dotty Barajas MD    D: 05/14/2019 18:02:45       T: 05/14/2019 23:47:27     [id number]  Job#: [id number]     Doc#: [id number]    CC:

## 2019-05-15 NOTE — DISCHARGE SUMMARY
DISCHARGE SUMMARY    Pt Name: Ar Larson  MRN: [id number]  YOB: 1990  Primary Care Physician: ABHAY Weinstein CNP    Admit date:  5/14/2019  7:38 AM  Discharge date:  5/15/19  Disposition: Home  Admitting Diagnosis:   1. Thyroglossal duct cyst      Discharge Diagnosis:   Patient Active Problem List   Diagnosis Code    Asthma J45.909    Nicotine dependence F17.200    Thyroglossal duct cyst Q89.2     Consultants:  none  Procedures/Diagnostic Test:  Excision of thyroglossal duct cyst    Hospital Course: Davi Ayala originally presented to the hospital on 5/14/2019  7:38 AM for excision of thyroglossal duct cyst. She was stable  at the time of discharge, Davi Ayala was tolerating a regular diet, ambulating on her own accord and had adequate analgesia on oral pain medications. Patient had no signs or symptoms of complications. PHYSICAL EXAMINATION     Discharge Vitals:  height is 5' 9\" (1.753 m) and weight is 253 lb 9.6 oz (115 kg). Her oral temperature is 98.3 °F (36.8 °C). Her blood pressure is 127/90 (abnormal) and her pulse is 60. Her respiration is 20 and oxygen saturation is 99%. Patient was examined by Dr. Jo Contreras this morning. Patient ready for discharge. For physical exam findings, defer to Dr. Magdalena Lindsey note. LABS     No results for input(s): WBC, HGB, HCT, PLT, NA, K, CL, CO2, BUN, CREATININE in the last 72 hours. DISCHARGE INSTRUCTIONS     Discharge Medications:      Medication List      START taking these medications    docusate 100 MG Caps  Commonly known as:  COLACE, DULCOLAX  Take 100 mg by mouth 2 times daily until first bowel movement     HYDROcodone-acetaminophen 5-325 MG per tablet  Commonly known as:  NORCO  Take 1 tablet by mouth every 4 hours as needed for Pain for up to 3 days.      ondansetron 8 MG Tbdp disintegrating tablet  Commonly known as:  ZOFRAN-ODT  Take 1 tablet by mouth every 8 hours as needed for Nausea or Vomiting        CONTINUE taking these medications    * albuterol 0.63 MG/3ML nebulizer solution  Commonly known as:  ACCUNEB     * albuterol sulfate  (90 Base) MCG/ACT inhaler         * This list has 2 medication(s) that are the same as other medications prescribed for you. Read the directions carefully, and ask your doctor or other care provider to review them with you.                Where to Get Your Medications      These medications were sent to [address] [place] [address] - [address] [place] [address] -  [address]    Phone:  [phone]   · docusate 100 MG Caps  · HYDROcodone-acetaminophen 5-325 MG per tablet  · ondansetron 8 MG Tbdp disintegrating tablet       Diet: diet as tolerated  Activity: activity as tolerated  Wound Care: Daily and as needed  Follow-up:  Follow up with Dr. Kelsey Mcallister on 5/20/19 in the ENT office  Time Spent for discharge: 15 minutes    Electronically signed by ABRAHAM Bahena on 5/15/2019 at 8:55 AM

## 2019-05-15 NOTE — PROGRESS NOTES
Department of Otolaryngology  Progress Note    ChiefComplaint:  S/p sistrunk procedure    SUBJECTIVE:  Doing well. Pain controlled. Ambulating well. Eating anything she wants. She continues to have some pain with swallowing. Review of Systems    OBJECTIVE      Physical  VITALS:  BP (!) 127/90   Pulse 60   Temp 98.3 °F (36.8 °C) (Oral)   Resp 20   Ht 5' 9\" (1.753 m)   Wt 253 lb 9.6 oz (115 kg)   SpO2 99%   BMI 37.45 kg/m²     Afeb, VSS  NAD, unlabored breathign  EOMi, PERRL  External ear normal, exteranl nose normal  OC/OP:  No lesions noted  Neck:  Anterior incision clean, dry, intact. Serosanguinous output from drain --> removed. Moves all extremities well. Inpatient Medications  Current Facility-Administered Medications: sodium chloride flush 0.9 % injection 10 mL, 10 mL, Intravenous, 2 times per day  sodium chloride flush 0.9 % injection 10 mL, 10 mL, Intravenous, PRN  ondansetron (ZOFRAN) injection 4 mg, 4 mg, Intravenous, Q6H PRN  dextrose 5 % and 0.45 % sodium chloride infusion, , Intravenous, Continuous  HYDROcodone-acetaminophen (NORCO) 5-325 MG per tablet 1 tablet, 1 tablet, Oral, Q4H PRN **OR** HYDROcodone-acetaminophen (NORCO) 5-325 MG per tablet 2 tablet, 2 tablet, Oral, Q4H PRN  docusate sodium (COLACE) capsule 100 mg, 100 mg, Oral, BID  ampicillin-sulbactam (UNASYN) 3 g ivpb minibag, 3 g, Intravenous, Q6H    ASSESSMENT AND PLAN    69-year-old female postop day 1 for thyroglossal duct cyst removal.  She is doing well. Drain was removed today. She will be discharged home today on regular diet and light activity for 1 week. Follow up in 1 week as scheduled. I will give her prescription for Norco and Colace as well as Zofran PRN for nausea.         Electronically signed by Sandro Moyer MD on 5/15/2019 at 12:37 PM

## 2019-05-20 ENCOUNTER — HOSPITAL ENCOUNTER (EMERGENCY)
Age: 29
Discharge: HOME | End: 2019-05-21
Attending: EMERGENCY MEDICINE
Payer: MEDICARE

## 2019-05-20 ENCOUNTER — APPOINTMENT (OUTPATIENT)
Dept: GENERAL RADIOLOGY | Age: 29
End: 2019-05-20
Payer: MEDICARE

## 2019-05-20 ENCOUNTER — OFFICE VISIT (OUTPATIENT)
Dept: ENT CLINIC | Age: 29
End: 2019-05-20

## 2019-05-20 VITALS
TEMPERATURE: 98 F | HEART RATE: 68 BPM | RESPIRATION RATE: 16 BRPM | DIASTOLIC BLOOD PRESSURE: 68 MMHG | SYSTOLIC BLOOD PRESSURE: 120 MMHG | HEIGHT: 69 IN | BODY MASS INDEX: 37.62 KG/M2 | WEIGHT: 254 LBS

## 2019-05-20 DIAGNOSIS — S29.011A INTERCOSTAL MUSCLE STRAIN, INITIAL ENCOUNTER: Primary | ICD-10-CM

## 2019-05-20 DIAGNOSIS — Q89.2 THYROGLOSSAL DUCT CYST: Primary | ICD-10-CM

## 2019-05-20 LAB
ANION GAP SERPL CALCULATED.3IONS-SCNC: 14 MEQ/L (ref 8–16)
BASOPHILS # BLD: 0.2 %
BASOPHILS ABSOLUTE: 0 THOU/MM3 (ref 0–0.1)
BUN BLDV-MCNC: 8 MG/DL (ref 7–22)
CALCIUM SERPL-MCNC: 9.7 MG/DL (ref 8.5–10.5)
CHLORIDE BLD-SCNC: 103 MEQ/L (ref 98–111)
CO2: 21 MEQ/L (ref 23–33)
CREAT SERPL-MCNC: 0.7 MG/DL (ref 0.4–1.2)
EKG ATRIAL RATE: 62 BPM
EKG P AXIS: 44 DEGREES
EKG P-R INTERVAL: 134 MS
EKG Q-T INTERVAL: 420 MS
EKG QRS DURATION: 82 MS
EKG QTC CALCULATION (BAZETT): 426 MS
EKG R AXIS: 9 DEGREES
EKG T AXIS: 19 DEGREES
EKG VENTRICULAR RATE: 62 BPM
EOSINOPHIL # BLD: 0.2 %
EOSINOPHILS ABSOLUTE: 0 THOU/MM3 (ref 0–0.4)
ERYTHROCYTE [DISTWIDTH] IN BLOOD BY AUTOMATED COUNT: 13.9 % (ref 11.5–14.5)
ERYTHROCYTE [DISTWIDTH] IN BLOOD BY AUTOMATED COUNT: 41.6 FL (ref 35–45)
GFR SERPL CREATININE-BSD FRML MDRD: > 90 ML/MIN/1.73M2
GLUCOSE BLD-MCNC: 102 MG/DL (ref 70–108)
HCT VFR BLD CALC: 41.9 % (ref 37–47)
HEMOGLOBIN: 13.7 GM/DL (ref 12–16)
IMMATURE GRANS (ABS): 0.04 THOU/MM3 (ref 0–0.07)
IMMATURE GRANULOCYTES: 0.3 %
LYMPHOCYTES # BLD: 10.7 %
LYMPHOCYTES ABSOLUTE: 1.3 THOU/MM3 (ref 1–4.8)
MCH RBC QN AUTO: 27.2 PG (ref 26–33)
MCHC RBC AUTO-ENTMCNC: 32.7 GM/DL (ref 32.2–35.5)
MCV RBC AUTO: 83.3 FL (ref 81–99)
MONOCYTES # BLD: 1.2 %
MONOCYTES ABSOLUTE: 0.1 THOU/MM3 (ref 0.4–1.3)
NUCLEATED RED BLOOD CELLS: 0 /100 WBC
OSMOLALITY CALCULATION: 274.2 MOSMOL/KG (ref 275–300)
PLATELET # BLD: 317 THOU/MM3 (ref 130–400)
PMV BLD AUTO: 10.4 FL (ref 9.4–12.4)
POTASSIUM SERPL-SCNC: 4.6 MEQ/L (ref 3.5–5.2)
PREGNANCY, SERUM: NEGATIVE
RBC # BLD: 5.03 MILL/MM3 (ref 4.2–5.4)
SEG NEUTROPHILS: 87.4 %
SEGMENTED NEUTROPHILS ABSOLUTE COUNT: 10.8 THOU/MM3 (ref 1.8–7.7)
SODIUM BLD-SCNC: 138 MEQ/L (ref 135–145)
TROPONIN T: < 0.01 NG/ML
WBC # BLD: 12.3 THOU/MM3 (ref 4.8–10.8)

## 2019-05-20 PROCEDURE — 93010 ELECTROCARDIOGRAM REPORT: CPT | Performed by: INTERNAL MEDICINE

## 2019-05-20 PROCEDURE — 99285 EMERGENCY DEPT VISIT HI MDM: CPT

## 2019-05-20 PROCEDURE — 71046 X-RAY EXAM CHEST 2 VIEWS: CPT

## 2019-05-20 PROCEDURE — 99024 POSTOP FOLLOW-UP VISIT: CPT | Performed by: OTOLARYNGOLOGY

## 2019-05-20 PROCEDURE — 83735 ASSAY OF MAGNESIUM: CPT

## 2019-05-20 PROCEDURE — 80048 BASIC METABOLIC PNL TOTAL CA: CPT

## 2019-05-20 PROCEDURE — 36415 COLL VENOUS BLD VENIPUNCTURE: CPT

## 2019-05-20 PROCEDURE — 84484 ASSAY OF TROPONIN QUANT: CPT

## 2019-05-20 PROCEDURE — 93005 ELECTROCARDIOGRAM TRACING: CPT | Performed by: EMERGENCY MEDICINE

## 2019-05-20 PROCEDURE — 84703 CHORIONIC GONADOTROPIN ASSAY: CPT

## 2019-05-20 PROCEDURE — 85025 COMPLETE CBC W/AUTO DIFF WBC: CPT

## 2019-05-20 RX ORDER — IPRATROPIUM BROMIDE 17 UG/1
2 AEROSOL, METERED RESPIRATORY (INHALATION) PRN
Refills: 1 | COMMUNITY
Start: 2019-02-26 | End: 2021-10-18

## 2019-05-20 RX ORDER — PREDNISONE 20 MG/1
40 TABLET ORAL DAILY
Qty: 10 TABLET | Refills: 0 | Status: SHIPPED | OUTPATIENT
Start: 2019-05-20 | End: 2019-05-25

## 2019-05-20 RX ORDER — HYDROCODONE BITARTRATE AND ACETAMINOPHEN 5; 325 MG/1; MG/1
1 TABLET ORAL DAILY PRN
COMMUNITY
End: 2019-05-30 | Stop reason: ALTCHOICE

## 2019-05-20 ASSESSMENT — ENCOUNTER SYMPTOMS
SHORTNESS OF BREATH: 0
VOMITING: 0
BACK PAIN: 1
ABDOMINAL PAIN: 0
SINUS PRESSURE: 0
NAUSEA: 0
NAUSEA: 0
RHINORRHEA: 0
CONSTIPATION: 0
ABDOMINAL DISTENTION: 0
PHOTOPHOBIA: 0
WHEEZING: 0
SHORTNESS OF BREATH: 0
EYE REDNESS: 0
EYE ITCHING: 0
TROUBLE SWALLOWING: 0
EYE ITCHING: 0
SORE THROAT: 0
FACIAL SWELLING: 0
STRIDOR: 0
EYE PAIN: 0
DIARRHEA: 0
BLOOD IN STOOL: 0
COUGH: 1
SORE THROAT: 0
VOMITING: 0
VOICE CHANGE: 0
SINUS PAIN: 0
EYE DISCHARGE: 0
CHEST TIGHTNESS: 0
VOICE CHANGE: 0
SINUS PRESSURE: 0
CHOKING: 0
COLOR CHANGE: 0
COUGH: 0
RHINORRHEA: 0

## 2019-05-20 ASSESSMENT — PAIN DESCRIPTION - DESCRIPTORS: DESCRIPTORS: TIGHTNESS

## 2019-05-20 ASSESSMENT — PAIN DESCRIPTION - PAIN TYPE: TYPE: ACUTE PAIN

## 2019-05-20 ASSESSMENT — PAIN DESCRIPTION - LOCATION: LOCATION: BACK;CHEST

## 2019-05-20 ASSESSMENT — PAIN SCALES - GENERAL: PAINLEVEL_OUTOF10: 7

## 2019-05-20 NOTE — PROGRESS NOTES
[address] [facility] [place], NOSE AND THROAT  [facility] 84  [address] [address]  Dept: [phone]  Dept Fax: [phone]  Loc: [phone]    Paul Boggs is a 33 y.o. female who was referred byNo ref. provider found for:  Chief Complaint   Patient presents with   Alfonzo Chris     post op- excision thyroglossal duct/sinus   . HPI:     Paul Boggs is a 33 y.o. female who presents today for postop day 6 evaluation for thyroglossal duct cyst excision (sistrunk procedure). Pathology did not reveal any malignancy. She reports she is doing well and is able to swallow anything she wants. Her neck tenderness has decreased and her pain is controlled. No changes to her voice. History: Allergies   Allergen Reactions    Rocephin [Ceftriaxone] Anaphylaxis    Toradol [Ketorolac Tromethamine] Hives     Current Outpatient Medications   Medication Sig Dispense Refill    ATROVENT HFA 17 MCG/ACT inhaler   1    HYDROcodone-acetaminophen (NORCO) 5-325 MG per tablet Take 1 tablet by mouth daily as needed for Pain.  predniSONE (DELTASONE) 20 MG tablet Take 2 tablets by mouth daily for 5 days 10 tablet 0    docusate sodium (COLACE, DULCOLAX) 100 MG CAPS Take 100 mg by mouth 2 times daily until first bowel movement 10 capsule 1    ondansetron (ZOFRAN-ODT) 8 MG TBDP disintegrating tablet Take 1 tablet by mouth every 8 hours as needed for Nausea or Vomiting 20 tablet 0    albuterol (ACCUNEB) 0.63 MG/3ML nebulizer solution Take 1 ampule by nebulization every 6 hours as needed for Wheezing      albuterol sulfate  (90 Base) MCG/ACT inhaler Inhale 2 puffs into the lungs every 6 hours as needed for Wheezing       No current facility-administered medications for this visit.       Past Medical History:   Diagnosis Date    Asthma     Nicotine dependence       Past Surgical History:   Procedure Laterality Date     SECTION      x3    THYROGLOSSAL DUCT EXCISION N/A 2019    EXCISION OF THYROGLOSSAL DUCT OR SINUS performed by Eddy Melvin MD at [address] [place] [address] History   Problem Relation Age of Onset    Diabetes Mother     High Blood Pressure Mother     Other Father      Social History     Tobacco Use    Smoking status: Former Smoker     Packs/day: 0.50     Years: 9.00     Pack years: 4.50     Types: Cigarettes     Last attempt to quit: 2019     Years since quittin.2    Smokeless tobacco: Never Used   Substance Use Topics    Alcohol use: No       Subjective:      Review of Systems   Constitutional: Negative. HENT: Negative for ear discharge, ear pain, facial swelling, hearing loss, nosebleeds, postnasal drip, rhinorrhea, sinus pressure, sinus pain, sore throat, tinnitus and voice change. Eyes: Negative for itching and visual disturbance. Respiratory: Negative for cough, shortness of breath and stridor. Gastrointestinal: Negative for nausea and vomiting. Musculoskeletal: Negative for neck pain and neck stiffness. Skin: Negative for color change and wound. Allergic/Immunologic: Negative for environmental allergies and immunocompromised state. Neurological: Negative for dizziness and facial asymmetry. Psychiatric/Behavioral: Negative for behavioral problems and hallucinations. All other systems reviewed and are negative. Rest of review of systems are negative, except as noted in HPI. Objective:   /68   Pulse 68   Temp 98 °F (36.7 °C) (Oral)   Resp 16   Ht 5' 9\" (1.753 m)   Wt 254 lb (115.2 kg)   BMI 37.51 kg/m²     PHYSICAL EXAM  Constitutional: He is oriented to person, place, and time. He appears well-developed and well-nourished. No distress. HENT:   Head: Normocephalic and atraumatic. Right Ear: External ear normal. Ear canal clear. Tympanic membrane intact. Middle ear aerated. Left Ear: External ear normal. Ear canal clear. Tympanic membrane intact. Middle ear aerated.     Nose: External nose normal.  Nasal mucosa normal.  No lesions noted. Mouth/Throat: Fair dentition. Oral cavity mucosa normal, no masses or lesions noted. Oropharynx is clear and moist.   Eyes: Pupils are equal, round, and reactive to light. Conjunctivae and EOM are normal.   Neck: Normal range of motion. Incision clean, dry, and intact. There is mild to moderate anterior neck swelling and induration, not tender. No JVD present. No tracheal deviation present. No thyromegaly present. No cervical lymphadenopathy noted. Cardiovascular: Normal rate. Pulmonary/Chest: Effort normal. No stridor or stertor. No respiratory distress. Musculoskeletal: Normal range of motion. He exhibits no edema or Lymphadenopathy. Neurological: He is alert and oriented to person, place, and time. Cranial nerve II-XII grossly intact. Skin: Skin is warm. No erythema. Psychiatric: Normal mood and affect. Behavior is normal.   Vitals reviewed. Data:  All of the past medical history, past surgical history, family history,social history, allergies and current medications were reviewed with the patient. Assessment & Plan   Diagnoses and all orders for this visit:     Diagnosis Orders   1. Thyroglossal duct cyst       71-year-old female who is postoperative day 6 for sistrunk procedure. She is doing well and her pain is controlled and she is not having any dysphagia. There is some swelling and induration from the surgical site. I will follow up in 1 week to reevaluate. The findings were explained and her questions were answered. Return in about 11 days (around 5/31/2019) for Postop visit. Craig Fofana MD    **This report has been created using voice recognition software. It may contain minor errors which are inherent in voice recognition technology. **

## 2019-05-21 VITALS
TEMPERATURE: 98.1 F | OXYGEN SATURATION: 98 % | SYSTOLIC BLOOD PRESSURE: 130 MMHG | DIASTOLIC BLOOD PRESSURE: 76 MMHG | HEART RATE: 60 BPM | BODY MASS INDEX: 35.55 KG/M2 | RESPIRATION RATE: 16 BRPM | HEIGHT: 69 IN | WEIGHT: 240 LBS

## 2019-05-21 LAB — MAGNESIUM: 1.9 MG/DL (ref 1.6–2.4)

## 2019-05-21 RX ORDER — CYCLOBENZAPRINE HCL 5 MG
5 TABLET ORAL 2 TIMES DAILY PRN
Qty: 30 TABLET | Refills: 0 | Status: SHIPPED | OUTPATIENT
Start: 2019-05-21 | End: 2019-05-30 | Stop reason: ALTCHOICE

## 2019-05-21 RX ORDER — IBUPROFEN 400 MG/1
400 TABLET ORAL EVERY 6 HOURS PRN
Qty: 120 TABLET | Refills: 0 | Status: SHIPPED | OUTPATIENT
Start: 2019-05-21 | End: 2019-09-29

## 2019-05-21 ASSESSMENT — PAIN DESCRIPTION - PROGRESSION: CLINICAL_PROGRESSION: NOT CHANGED

## 2019-05-21 ASSESSMENT — PAIN DESCRIPTION - FREQUENCY: FREQUENCY: CONTINUOUS

## 2019-05-21 ASSESSMENT — PAIN DESCRIPTION - PAIN TYPE: TYPE: ACUTE PAIN

## 2019-05-21 ASSESSMENT — PAIN DESCRIPTION - DESCRIPTORS: DESCRIPTORS: TIGHTNESS

## 2019-05-21 ASSESSMENT — PAIN DESCRIPTION - ONSET: ONSET: ON-GOING

## 2019-05-21 ASSESSMENT — PAIN DESCRIPTION - LOCATION: LOCATION: BACK;CHEST

## 2019-05-21 ASSESSMENT — PAIN SCALES - GENERAL: PAINLEVEL_OUTOF10: 7

## 2019-05-21 NOTE — ED TRIAGE NOTES
Pt to ED with complaints of back and chest pain. States that it started yesterday when she was just sitting down. States pain today that goes around her chest and back that feels \"tight\" 7/10. EKG completed. States that she had surgery on her thyroid on Tuesday.   Respirations even and unlabored upon arrival.

## 2019-05-21 NOTE — ED NOTES
Pt resting in bed with lights off for comfort. VS reassessed and stable. Pt updated on POC and verbalizes understanding as well as no further needs at this time. Will continue to monitor.       Fabienne Bowen RN  05/21/19 7076

## 2019-05-21 NOTE — ED PROVIDER NOTES
[facility]  eMERGENCY dEPARTMENT eNCOUnter          CHIEF COMPLAINT       Chief Complaint   Patient presents with    Chest Pain    Back Pain       Nurses Notes reviewed and I agreeexcept as noted in the HPI. HISTORY OF PRESENT ILLNESS    Elis Colin is a 33 y.o. female who presents to the Emergency Department for the evaluation of chest pain, back pain and cough onset last night. She reports that the back pain wraps around to her front rating her current pain a 7/10. She denies any fever at home. The patient has a documented history of asthma. The patient denies any other symptoms or relevant history at this time. The HPI was provided by the patient. REVIEW OF SYSTEMS      Review of Systems   Constitutional: Negative for activity change, appetite change, diaphoresis, fatigue and unexpected weight change. HENT: Negative for congestion, ear discharge, ear pain, hearing loss, rhinorrhea, sinus pressure, sore throat, trouble swallowing and voice change. Eyes: Negative for photophobia, pain, discharge, redness and itching. Respiratory: Positive for cough. Negative for choking, chest tightness, shortness of breath and wheezing. Cardiovascular: Positive for chest pain. Negative for palpitations and leg swelling. Gastrointestinal: Negative for abdominal distention, abdominal pain, blood in stool, constipation, diarrhea, nausea and vomiting. Endocrine: Negative for polydipsia, polyphagia and polyuria. Genitourinary: Negative for decreased urine volume, difficulty urinating, dysuria, enuresis, frequency, hematuria and urgency. Musculoskeletal: Positive for back pain. Negative for arthralgias, gait problem, myalgias, neck pain and neck stiffness. Skin: Negative for pallor and rash. Allergic/Immunologic: Negative for immunocompromised state.    Neurological: Negative for dizziness, tremors, seizures, syncope, facial asymmetry, weakness, light-headedness, numbness reports that she does not drink alcohol or use drugs. PHYSICAL EXAM     INITIAL VITALS:  height is 5' 9\" (1.753 m) and weight is 240 lb (108.9 kg). Her oral temperature is 98.1 °F (36.7 °C). Her blood pressure is 130/76 and her pulse is 60. Her respiration is 16 and oxygen saturation is 98%. Physical Exam   Constitutional: She is oriented to person, place, and time. She appears well-developed and well-nourished. No distress. HENT:   Head: Normocephalic and atraumatic. Right Ear: External ear normal.   Left Ear: External ear normal.   Nose: Nose normal.   Mouth/Throat: Oropharynx is clear and moist. No oropharyngeal exudate. Eyes: Pupils are equal, round, and reactive to light. Conjunctivae and EOM are normal. Right eye exhibits no discharge. Left eye exhibits no discharge. No scleral icterus. Neck: Normal range of motion. Neck supple. No JVD present. No tracheal deviation present. Cardiovascular: Normal rate, regular rhythm, normal heart sounds and intact distal pulses. Exam reveals no gallop and no friction rub. No murmur heard. Pulmonary/Chest: Effort normal and breath sounds normal. She has no wheezes. She has no rales. Abdominal: Soft. Bowel sounds are normal. She exhibits no mass. There is no tenderness. There is no rebound and no guarding. Musculoskeletal: Normal range of motion. She exhibits no edema or tenderness. Pain with palpitation in the left lower lateral ribs    Lymphadenopathy:     She has no cervical adenopathy. Neurological: She is alert and oriented to person, place, and time. She has normal reflexes. She displays normal reflexes. No cranial nerve deficit. She exhibits normal muscle tone. Coordination normal.   Skin: Skin is warm and dry. No rash noted. She is not diaphoretic. Psychiatric: She has a normal mood and affect. Her behavior is normal. Judgment and thought content normal.   Nursing note and vitals reviewed.         DIFFERENTIAL DIAGNOSIS:   Differential The patient arrived to the ED with back pain and chest pain. On exam, the patient had some pain with palpitation in the left lower lateral ribs consistent with musculoskeletal pain rather than acute heart abnormality. A chest X-ray was negative for active pathology. Laboratory work showed a negative troponin with a WBC of 12.3. The patient was discharged home with flexeril and ibuprofen to better manage pain. The patient was discharged home in stable condition and all questions were answered. Patient has what appears to be mild intercostal strain. Patient is instructed to use anti-inflammatories and muscle relaxers as prescribed. She is instructed to use heat on this no more than 20 minutes at a time no more than 4 times a day. She is instructed to follow-up with her primary care physician in do so within the next 1-2 days. She is instructed to return to the nearest emergency room immediately for any new or worsening complaints. CRITICALCARE:   None     CONSULTS:  None    PROCEDURES:  None    FINAL IMPRESSION      1. Intercostal muscle strain, initial encounter          DISPOSITION/PLAN   Discharge    PATIENT REFERRED TO:  ABHAY Paz [facility]  [address]  [phone]    Call in 2 days        DISCHARGE MEDICATIONS:  Discharge Medication List as of 5/21/2019 12:44 AM      START taking these medications    Details   ibuprofen (IBU) 400 MG tablet Take 1 tablet by mouth every 6 hours as needed for Pain, Disp-120 tablet, R-0Print      cyclobenzaprine (FLEXERIL) 5 MG tablet Take 1 tablet by mouth 2 times daily as needed for Muscle spasms, Disp-30 tablet, R-0Print             (Please note that portions of this note were completed with a voice recognition program.  Efforts weremade to edit the dictations but occasionally words are mis-transcribed.)    Scribe:  Ester Duverney 5/20/19 11:35 PM Scribing for and in the presence of Vernon Wilder DO.     Scribe: Ester Duverney 5/20/19 11:35 PM    Provider:  I personally performed the services described in the documentation, reviewed and edited the documentation which was dictated to the scribe inmy presence, and it accurately records my words and actions.     Antonio Rendon DO 5/20/19 6:27 AM       Antonio Rendon DO  05/21/19 0308

## 2019-05-27 ENCOUNTER — HOSPITAL ENCOUNTER (EMERGENCY)
Age: 29
Discharge: HOME | End: 2019-05-27
Payer: MEDICARE

## 2019-05-27 VITALS
HEIGHT: 69 IN | OXYGEN SATURATION: 98 % | RESPIRATION RATE: 16 BRPM | BODY MASS INDEX: 38.18 KG/M2 | DIASTOLIC BLOOD PRESSURE: 58 MMHG | TEMPERATURE: 98 F | WEIGHT: 257.8 LBS | HEART RATE: 79 BPM | SYSTOLIC BLOOD PRESSURE: 127 MMHG

## 2019-05-27 DIAGNOSIS — H66.002 ACUTE SUPPURATIVE OTITIS MEDIA OF LEFT EAR WITHOUT SPONTANEOUS RUPTURE OF TYMPANIC MEMBRANE, RECURRENCE NOT SPECIFIED: Primary | ICD-10-CM

## 2019-05-27 PROCEDURE — 99214 OFFICE O/P EST MOD 30 MIN: CPT | Performed by: NURSE PRACTITIONER

## 2019-05-27 PROCEDURE — 99212 OFFICE O/P EST SF 10 MIN: CPT

## 2019-05-27 RX ORDER — AMOXICILLIN 500 MG/1
500 CAPSULE ORAL 3 TIMES DAILY
Qty: 30 CAPSULE | Refills: 0 | Status: SHIPPED | OUTPATIENT
Start: 2019-05-27 | End: 2019-06-06

## 2019-05-27 ASSESSMENT — ENCOUNTER SYMPTOMS
EYE ITCHING: 0
NAUSEA: 0
SHORTNESS OF BREATH: 0
SORE THROAT: 1
VOMITING: 0
COUGH: 1

## 2019-05-27 ASSESSMENT — PAIN - FUNCTIONAL ASSESSMENT: PAIN_FUNCTIONAL_ASSESSMENT: ACTIVITIES ARE NOT PREVENTED

## 2019-05-27 ASSESSMENT — PAIN DESCRIPTION - LOCATION: LOCATION: THROAT

## 2019-05-27 ASSESSMENT — PAIN SCALES - GENERAL: PAINLEVEL_OUTOF10: 5

## 2019-05-28 NOTE — ED PROVIDER NOTES
(FLEXERIL) 5 MG TABLET    Take 1 tablet by mouth 2 times daily as needed for Muscle spasms    DOCUSATE SODIUM (COLACE, DULCOLAX) 100 MG CAPS    Take 100 mg by mouth 2 times daily until first bowel movement    HYDROCODONE-ACETAMINOPHEN (NORCO) 5-325 MG PER TABLET    Take 1 tablet by mouth daily as needed for Pain. IBUPROFEN (IBU) 400 MG TABLET    Take 1 tablet by mouth every 6 hours as needed for Pain    ONDANSETRON (ZOFRAN-ODT) 8 MG TBDP DISINTEGRATING TABLET    Take 1 tablet by mouth every 8 hours as needed for Nausea or Vomiting       ALLERGIES     Patient is is allergic to rocephin [ceftriaxone] and toradol [ketorolac tromethamine]. Patients   Immunization History   Administered Date(s) Administered    Tdap (Boostrix, Adacel) 06/09/2016       FAMILY HISTORY     Patient's family history includes Diabetes in her mother; High Blood Pressure in her mother; Other in her father. SOCIAL HISTORY     Patient  reports that she quit smoking about 3 months ago. Her smoking use included cigarettes. She has a 4.50 pack-year smoking history. She has never used smokeless tobacco. She reports that she does not drink alcohol or use drugs. PHYSICAL EXAM     ED TRIAGE VITALS  BP: (!) 127/58, Temp: 98 °F (36.7 °C), Pulse: 79, Resp: 16, SpO2: 98 %,Estimated body mass index is 38.07 kg/m² as calculated from the following:    Height as of this encounter: 5' 9\" (1.753 m). Weight as of this encounter: 257 lb 12.8 oz (116.9 kg). ,Patient's last menstrual period was 05/16/2019. Physical Exam   Constitutional: She is oriented to person, place, and time. She appears well-developed and well-nourished. No distress. HENT:   Right Ear: Tympanic membrane normal.   Left Ear: Tympanic membrane is erythematous and bulging. A middle ear effusion is present. Mouth/Throat: Oropharynx is clear and moist. Tonsils are 0 on the right. Tonsils are 0 on the left. Eyes: Right conjunctiva is not injected.  Left conjunctiva is not injected. Pupils are equal.   Neck: Normal range of motion. Cardiovascular: Normal rate and regular rhythm. No murmur heard. Pulmonary/Chest: Effort normal and breath sounds normal. No respiratory distress. Musculoskeletal:        Right knee: She exhibits normal range of motion. Left knee: She exhibits normal range of motion. Lymphadenopathy:        Head (right side): No preauricular and no posterior auricular adenopathy present. Head (left side): No preauricular and no posterior auricular adenopathy present. She has no cervical adenopathy. Neurological: She is alert and oriented to person, place, and time. No sensory deficit. GCS eye subscore is 4. GCS verbal subscore is 5. GCS motor subscore is 6. Skin: Skin is warm. No rash noted. She is not diaphoretic. Psychiatric: She has a normal mood and affect. Her behavior is normal.       DIAGNOSTIC RESULTS     Labs:No results found for this visit on 05/27/19. IMAGING:    No orders to display         EKG:  none    URGENT CARE COURSE:     Vitals:    05/27/19 1933   BP: (!) 127/58   Pulse: 79   Resp: 16   Temp: 98 °F (36.7 °C)   SpO2: 98%   Weight: 257 lb 12.8 oz (116.9 kg)   Height: 5' 9\" (1.753 m)       Medications - No data to display         PROCEDURES:  None    FINAL IMPRESSION      1. Acute suppurative otitis media of left ear without spontaneous rupture of tympanic membrane, recurrence not specified          DISPOSITION/ PLAN     Exam is consistent with otitis media of left ear. Will treat with amoxicillin and is advised to use decongestants OTC at home. Follow up with PCP as needed. Is agreeable to plan as discussed.     PATIENT REFERRED TO:  Thi Celaya, ABHAY - CNP  [address] [facility] [address]      DISCHARGE MEDICATIONS:  New Prescriptions    AMOXICILLIN (AMOXIL) 500 MG CAPSULE    Take 1 capsule by mouth 3 times daily for 10 days       Discontinued Medications    No medications on file       Current Discharge Medication List ABHAY Almeida - CNP    (Please note that portions of this note were completed with a voice recognition program. Efforts were made to edit the dictations but occasionally words are mis-transcribed.)          ABHAY Almeida - SIA  05/27/19 2011

## 2019-05-30 ENCOUNTER — OFFICE VISIT (OUTPATIENT)
Dept: ENT CLINIC | Age: 29
End: 2019-05-30

## 2019-05-30 VITALS
SYSTOLIC BLOOD PRESSURE: 128 MMHG | TEMPERATURE: 98.2 F | HEART RATE: 88 BPM | BODY MASS INDEX: 37.56 KG/M2 | WEIGHT: 253.6 LBS | DIASTOLIC BLOOD PRESSURE: 80 MMHG | RESPIRATION RATE: 16 BRPM | HEIGHT: 69 IN

## 2019-05-30 DIAGNOSIS — Q89.2 THYROGLOSSAL DUCT CYST: Primary | ICD-10-CM

## 2019-05-30 PROCEDURE — 99024 POSTOP FOLLOW-UP VISIT: CPT | Performed by: OTOLARYNGOLOGY

## 2019-05-30 ASSESSMENT — ENCOUNTER SYMPTOMS
VOMITING: 0
NAUSEA: 0
RHINORRHEA: 0
SINUS PRESSURE: 0
COLOR CHANGE: 0
SINUS PAIN: 0
EYE ITCHING: 0
FACIAL SWELLING: 0
SHORTNESS OF BREATH: 0
SORE THROAT: 0
STRIDOR: 0
COUGH: 0
VOICE CHANGE: 0

## 2019-05-30 NOTE — PROGRESS NOTES
[address] [place], NOSE AND THROAT  [facility] 84  [facility] [place] [address]  Dept: [phone]  Dept Fax: [phone]  Loc: [phone]    Marci Perkins is a 33 y.o. female who was referred byNo ref. provider found for:  Chief Complaint   Patient presents with    Follow-up     11 Day Follow-up   . HPI:   Marci Perkins is a 33 y.o. female who presents today for s/p evaluation for thyroglossal duct cyst excision (sistrunk procedure) on 19. Pathology did not reveal any malignancy. She is doing well with no pain. Her globus sensation has cleared. She feels like pre-surgery she had troubles with singing due to the cyst and that this has resolved after surgery. History: Allergies   Allergen Reactions    Rocephin [Ceftriaxone] Anaphylaxis    Toradol [Ketorolac Tromethamine] Hives     Current Outpatient Medications   Medication Sig Dispense Refill    amoxicillin (AMOXIL) 500 MG capsule Take 1 capsule by mouth 3 times daily for 10 days 30 capsule 0    ibuprofen (IBU) 400 MG tablet Take 1 tablet by mouth every 6 hours as needed for Pain 120 tablet 0    ATROVENT HFA 17 MCG/ACT inhaler 2 puffs as needed   1    albuterol (ACCUNEB) 0.63 MG/3ML nebulizer solution Take 1 ampule by nebulization every 6 hours as needed for Wheezing      albuterol sulfate  (90 Base) MCG/ACT inhaler Inhale 2 puffs into the lungs every 6 hours as needed for Wheezing       No current facility-administered medications for this visit.       Past Medical History:   Diagnosis Date    Asthma     Nicotine dependence       Past Surgical History:   Procedure Laterality Date     SECTION      x3    THYROGLOSSAL DUCT EXCISION N/A 2019    EXCISION OF THYROGLOSSAL DUCT OR SINUS performed by Craig Fofana MD at [facility] History   Problem Relation Age of Onset    Diabetes Mother     High Blood Pressure Mother     Other Father         Sickle Cell Trait    Asthma Sister     Asthma Brother      Social History     Tobacco Use    Smoking status: Former Smoker     Packs/day: 0.50     Years: 9.00     Pack years: 4.50     Types: Cigarettes     Last attempt to quit: 2019     Years since quittin.3    Smokeless tobacco: Never Used   Substance Use Topics    Alcohol use: No       Subjective:      Review of Systems   Constitutional: Negative. HENT: Negative for ear discharge, ear pain, facial swelling, hearing loss, nosebleeds, postnasal drip, rhinorrhea, sinus pressure, sinus pain, sore throat, tinnitus and voice change. Eyes: Negative for itching and visual disturbance. Respiratory: Negative for cough, shortness of breath and stridor. Gastrointestinal: Negative for nausea and vomiting. Musculoskeletal: Negative for neck pain and neck stiffness. Skin: Negative for color change and wound. Allergic/Immunologic: Negative for environmental allergies and immunocompromised state. Neurological: Negative for dizziness and facial asymmetry. Psychiatric/Behavioral: Negative for behavioral problems and hallucinations. All other systems reviewed and are negative. Rest of review of systems are negative, except as noted in HPI. Objective:   /80 (Site: Right Upper Arm, Position: Sitting, Cuff Size: Medium Adult)   Pulse 88   Temp 98.2 °F (36.8 °C) (Oral)   Resp 16   Ht 5' 9\" (1.753 m)   Wt 253 lb 9.6 oz (115 kg)   LMP 2019   BMI 37.45 kg/m²     PHYSICAL EXAM  Constitutional: He is oriented to person, place, and time. He appears well-developed and well-nourished. No distress. HENT:   Head: Normocephalic and atraumatic. Right Ear: External ear normal. Ear canal clear. Tympanic membrane intact. Middle ear aerated. Left Ear: External ear normal. Ear canal clear. Tympanic membrane intact. Middle ear aerated. Nose: External nose normal.  Nasal mucosa normal.  No lesions noted. Mouth/Throat: Fair dentition. Oral cavity mucosa normal, no masses or lesions noted. Oropharynx is clear and moist.   Eyes: Pupils are equal, round, and reactive to light. Conjunctivae and EOM are normal.   Neck: Normal range of motion. Neck supple. No JVD present. No tracheal deviation present. No thyromegaly present. No cervical lymphadenopathy noted. Incision:  Clean, dry intact, there is some mild post surgical induration, no tenderness or fluctuance. Pt is not bothered by the induration. Cardiovascular: Normal rate. Pulmonary/Chest: Effort normal. No stridor or stertor. No respiratory distress. Musculoskeletal: Normal range of motion. He exhibits no edema or Lymphadenopathy. Neurological: He is alert and oriented to person, place, and time. Cranial nerve II-XII grossly intact. Skin: Skin is warm. No erythema. Psychiatric: Normal mood and affect. Behavior is normal.   Vitals reviewed. Data:  All of the past medical history, past surgical history, family history,social history, allergies and current medications were reviewed with the patient. Assessment & Plan   Diagnoses and all orders for this visit:     Diagnosis Orders   1. Thyroglossal duct cyst       67-year-old female status post thyroglossal duct excision (sistrunk procedure). She is doing well and is happy with results. I discussed minimizing sun exposure to the scar site as well as massaging the area to reduce defects of scarring of her strap muscles. Follow-up as needed. The findings were explained and her questions were answered. Return if symptoms worsen or fail to improve. Mac Ernandez MD    **This report has been created using voice recognition software. It may contain minor errors which are inherent in voice recognition technology. **

## 2019-08-15 ENCOUNTER — HOSPITAL ENCOUNTER (EMERGENCY)
Age: 29
Discharge: HOME | End: 2019-08-15
Attending: EMERGENCY MEDICINE
Payer: MEDICARE

## 2019-08-15 ENCOUNTER — APPOINTMENT (OUTPATIENT)
Dept: CT IMAGING | Age: 29
End: 2019-08-15
Payer: MEDICARE

## 2019-08-15 ENCOUNTER — APPOINTMENT (OUTPATIENT)
Dept: GENERAL RADIOLOGY | Age: 29
End: 2019-08-15
Payer: MEDICARE

## 2019-08-15 VITALS
HEIGHT: 69 IN | RESPIRATION RATE: 14 BRPM | DIASTOLIC BLOOD PRESSURE: 67 MMHG | BODY MASS INDEX: 34.07 KG/M2 | OXYGEN SATURATION: 98 % | TEMPERATURE: 98 F | HEART RATE: 67 BPM | WEIGHT: 230 LBS | SYSTOLIC BLOOD PRESSURE: 122 MMHG

## 2019-08-15 DIAGNOSIS — K59.00 CONSTIPATION, UNSPECIFIED CONSTIPATION TYPE: Primary | ICD-10-CM

## 2019-08-15 LAB
ALBUMIN SERPL-MCNC: 4.2 G/DL (ref 3.5–5.1)
ALP BLD-CCNC: 74 U/L (ref 38–126)
ALT SERPL-CCNC: 19 U/L (ref 11–66)
AMPHETAMINE+METHAMPHETAMINE URINE SCREEN: NEGATIVE
ANION GAP SERPL CALCULATED.3IONS-SCNC: 13 MEQ/L (ref 8–16)
AST SERPL-CCNC: 19 U/L (ref 5–40)
BACTERIA: ABNORMAL /HPF
BARBITURATE QUANTITATIVE URINE: NEGATIVE
BASOPHILS # BLD: 0.4 %
BASOPHILS ABSOLUTE: 0 THOU/MM3 (ref 0–0.1)
BENZODIAZEPINE QUANTITATIVE URINE: NEGATIVE
BILIRUB SERPL-MCNC: < 0.2 MG/DL (ref 0.3–1.2)
BILIRUBIN DIRECT: < 0.2 MG/DL (ref 0–0.3)
BILIRUBIN URINE: NEGATIVE
BLOOD, URINE: NEGATIVE
BUN BLDV-MCNC: 8 MG/DL (ref 7–22)
CALCIUM SERPL-MCNC: 8.9 MG/DL (ref 8.5–10.5)
CANNABINOID QUANTITATIVE URINE: NEGATIVE
CASTS 2: ABNORMAL /LPF
CASTS UA: ABNORMAL /LPF
CHARACTER, URINE: CLEAR
CHLORIDE BLD-SCNC: 104 MEQ/L (ref 98–111)
CO2: 25 MEQ/L (ref 23–33)
COCAINE METABOLITE QUANTITATIVE URINE: NEGATIVE
COLOR: YELLOW
CREAT SERPL-MCNC: 0.7 MG/DL (ref 0.4–1.2)
CRYSTALS, UA: ABNORMAL
D-DIMER QUANTITATIVE: 472 NG/ML FEU (ref 0–500)
EKG ATRIAL RATE: 80 BPM
EKG P AXIS: 58 DEGREES
EKG P-R INTERVAL: 140 MS
EKG Q-T INTERVAL: 390 MS
EKG QRS DURATION: 74 MS
EKG QTC CALCULATION (BAZETT): 449 MS
EKG R AXIS: 4 DEGREES
EKG T AXIS: 4 DEGREES
EKG VENTRICULAR RATE: 80 BPM
EOSINOPHIL # BLD: 3.8 %
EOSINOPHILS ABSOLUTE: 0.3 THOU/MM3 (ref 0–0.4)
EPITHELIAL CELLS, UA: ABNORMAL /HPF
ERYTHROCYTE [DISTWIDTH] IN BLOOD BY AUTOMATED COUNT: 13.8 % (ref 11.5–14.5)
ERYTHROCYTE [DISTWIDTH] IN BLOOD BY AUTOMATED COUNT: 41.1 FL (ref 35–45)
GFR SERPL CREATININE-BSD FRML MDRD: > 90 ML/MIN/1.73M2
GLUCOSE BLD-MCNC: 99 MG/DL (ref 70–108)
GLUCOSE URINE: NEGATIVE MG/DL
HCT VFR BLD CALC: 38.2 % (ref 37–47)
HEMOGLOBIN: 12.8 GM/DL (ref 12–16)
IMMATURE GRANS (ABS): 0.02 THOU/MM3 (ref 0–0.07)
IMMATURE GRANULOCYTES: 0 %
KETONES, URINE: NEGATIVE
LEUKOCYTE ESTERASE, URINE: ABNORMAL
LIPASE: 12.9 U/L (ref 5.6–51.3)
LYMPHOCYTES # BLD: 32.1 %
LYMPHOCYTES ABSOLUTE: 2.9 THOU/MM3 (ref 1–4.8)
MCH RBC QN AUTO: 27.7 PG (ref 26–33)
MCHC RBC AUTO-ENTMCNC: 33.5 GM/DL (ref 32.2–35.5)
MCV RBC AUTO: 82.7 FL (ref 81–99)
MISCELLANEOUS 2: ABNORMAL
MONOCYTES # BLD: 6 %
MONOCYTES ABSOLUTE: 0.5 THOU/MM3 (ref 0.4–1.3)
NITRITE, URINE: NEGATIVE
NUCLEATED RED BLOOD CELLS: 0 /100 WBC
OPIATES, URINE: NEGATIVE
OSMOLALITY CALCULATION: 281.5 MOSMOL/KG (ref 275–300)
OXYCODONE: NEGATIVE
PH UA: 6 (ref 5–9)
PHENCYCLIDINE QUANTITATIVE URINE: NEGATIVE
PLATELET # BLD: 284 THOU/MM3 (ref 130–400)
PMV BLD AUTO: 10.6 FL (ref 9.4–12.4)
POTASSIUM REFLEX MAGNESIUM: 3.8 MEQ/L (ref 3.5–5.2)
PREGNANCY, SERUM: NEGATIVE
PRO-BNP: 8.7 PG/ML (ref 0–450)
PROTEIN UA: NEGATIVE
RBC # BLD: 4.62 MILL/MM3 (ref 4.2–5.4)
RBC URINE: ABNORMAL /HPF
RENAL EPITHELIAL, UA: ABNORMAL
SEG NEUTROPHILS: 57.5 %
SEGMENTED NEUTROPHILS ABSOLUTE COUNT: 5.2 THOU/MM3 (ref 1.8–7.7)
SODIUM BLD-SCNC: 142 MEQ/L (ref 135–145)
SPECIFIC GRAVITY, URINE: 1.01 (ref 1–1.03)
TOTAL PROTEIN: 7 G/DL (ref 6.1–8)
TROPONIN T: < 0.01 NG/ML
UROBILINOGEN, URINE: 0.2 EU/DL (ref 0–1)
WBC # BLD: 9 THOU/MM3 (ref 4.8–10.8)
WBC UA: ABNORMAL /HPF
YEAST: ABNORMAL

## 2019-08-15 PROCEDURE — 83880 ASSAY OF NATRIURETIC PEPTIDE: CPT

## 2019-08-15 PROCEDURE — 6360000004 HC RX CONTRAST MEDICATION: Performed by: EMERGENCY MEDICINE

## 2019-08-15 PROCEDURE — 80307 DRUG TEST PRSMV CHEM ANLYZR: CPT

## 2019-08-15 PROCEDURE — 71275 CT ANGIOGRAPHY CHEST: CPT

## 2019-08-15 PROCEDURE — 83690 ASSAY OF LIPASE: CPT

## 2019-08-15 PROCEDURE — 84484 ASSAY OF TROPONIN QUANT: CPT

## 2019-08-15 PROCEDURE — 93010 ELECTROCARDIOGRAM REPORT: CPT | Performed by: INTERNAL MEDICINE

## 2019-08-15 PROCEDURE — 80048 BASIC METABOLIC PNL TOTAL CA: CPT

## 2019-08-15 PROCEDURE — 99285 EMERGENCY DEPT VISIT HI MDM: CPT

## 2019-08-15 PROCEDURE — 85025 COMPLETE CBC W/AUTO DIFF WBC: CPT

## 2019-08-15 PROCEDURE — 71045 X-RAY EXAM CHEST 1 VIEW: CPT

## 2019-08-15 PROCEDURE — 93005 ELECTROCARDIOGRAM TRACING: CPT | Performed by: EMERGENCY MEDICINE

## 2019-08-15 PROCEDURE — 74177 CT ABD & PELVIS W/CONTRAST: CPT

## 2019-08-15 PROCEDURE — 36415 COLL VENOUS BLD VENIPUNCTURE: CPT

## 2019-08-15 PROCEDURE — 84703 CHORIONIC GONADOTROPIN ASSAY: CPT

## 2019-08-15 PROCEDURE — 80076 HEPATIC FUNCTION PANEL: CPT

## 2019-08-15 PROCEDURE — 85379 FIBRIN DEGRADATION QUANT: CPT

## 2019-08-15 PROCEDURE — 6370000000 HC RX 637 (ALT 250 FOR IP): Performed by: EMERGENCY MEDICINE

## 2019-08-15 PROCEDURE — 81001 URINALYSIS AUTO W/SCOPE: CPT

## 2019-08-15 RX ORDER — IBUPROFEN 200 MG
600 TABLET ORAL ONCE
Status: COMPLETED | OUTPATIENT
Start: 2019-08-15 | End: 2019-08-15

## 2019-08-15 RX ORDER — POLYETHYLENE GLYCOL 3350 17 G/17G
17 POWDER, FOR SOLUTION ORAL DAILY
Qty: 510 G | Refills: 0 | Status: SHIPPED | OUTPATIENT
Start: 2019-08-15 | End: 2019-09-14

## 2019-08-15 RX ADMIN — IOPAMIDOL 80 ML: 755 INJECTION, SOLUTION INTRAVENOUS at 01:55

## 2019-08-15 RX ADMIN — IBUPROFEN 600 MG: 200 TABLET, FILM COATED ORAL at 01:19

## 2019-08-15 ASSESSMENT — ENCOUNTER SYMPTOMS
EYE ITCHING: 0
TROUBLE SWALLOWING: 0
BLOOD IN STOOL: 0
CHOKING: 0
EYE DISCHARGE: 0
NAUSEA: 0
SINUS PRESSURE: 0
WHEEZING: 0
ABDOMINAL PAIN: 1
VOICE CHANGE: 0
SORE THROAT: 0
EYE PAIN: 0
BACK PAIN: 0
RHINORRHEA: 0
PHOTOPHOBIA: 0
COUGH: 0
ABDOMINAL DISTENTION: 0
SHORTNESS OF BREATH: 1
CHEST TIGHTNESS: 0
CONSTIPATION: 0
DIARRHEA: 0
EYE REDNESS: 0
VOMITING: 0

## 2019-08-15 ASSESSMENT — PAIN DESCRIPTION - ORIENTATION
ORIENTATION: LEFT

## 2019-08-15 ASSESSMENT — PAIN DESCRIPTION - PAIN TYPE
TYPE: ACUTE PAIN

## 2019-08-15 ASSESSMENT — PAIN DESCRIPTION - FREQUENCY
FREQUENCY: CONTINUOUS
FREQUENCY: CONTINUOUS

## 2019-08-15 ASSESSMENT — PAIN SCALES - GENERAL
PAINLEVEL_OUTOF10: 3
PAINLEVEL_OUTOF10: 5
PAINLEVEL_OUTOF10: 5

## 2019-08-15 ASSESSMENT — PAIN DESCRIPTION - LOCATION
LOCATION: BACK;CHEST
LOCATION: BACK
LOCATION: BACK;CHEST

## 2019-08-15 ASSESSMENT — PAIN DESCRIPTION - DESCRIPTORS
DESCRIPTORS: ACHING;SHARP
DESCRIPTORS: ACHING;SHARP

## 2019-08-15 NOTE — ED PROVIDER NOTES
instructed to take it as prescribed. Patient is instructed to follow-up with a primary care physician and to do so within the next 1 to 2 days. She is instructed use Tylenol Motrin for any pain or discomfort. She is instructed to return to the nearest emergency room immediately for any new or worsening complaints. CRITICALCARE:   None    CONSULTS:  None    PROCEDURES:  None    FINAL IMPRESSION      1. Constipation, unspecified constipation type          DISPOSITION/PLAN   Discharge    PATIENT REFERRED TO:  Gilles Patel, APRN - [facility]  [address] [place] [address]  [phone]    Call in 1 day        DISCHARGE MEDICATIONS:  New Prescriptions    POLYETHYLENE GLYCOL (GLYCOLAX) POWDER    Take 17 g by mouth daily       (Please note that portions of this note were completed with a voice recognition program.  Efforts weremade to edit the dictations but occasionally words are mis-transcribed.)    Scribe:  Ovi Pacheco 8/15/19 12:46 AM Scribing for and in the presence ofCelso Espinosa DO. Scribe: Ovi Pacheco 8/15/19 12:46 AM    Provider:  I personally performed the services described in the documentation, reviewed and edited the documentation which was dictated to the scribe inmy presence, and it accurately records my words and actions.     Gage Nash DO 8/15/19 2:40 AM       Gage Nash DO  08/15/19 0606

## 2019-09-29 ENCOUNTER — HOSPITAL ENCOUNTER (EMERGENCY)
Age: 29
Discharge: HOME | End: 2019-09-29
Payer: MEDICARE

## 2019-09-29 VITALS
RESPIRATION RATE: 20 BRPM | WEIGHT: 240 LBS | TEMPERATURE: 98.1 F | BODY MASS INDEX: 35.44 KG/M2 | HEART RATE: 73 BPM | OXYGEN SATURATION: 100 % | DIASTOLIC BLOOD PRESSURE: 70 MMHG | SYSTOLIC BLOOD PRESSURE: 155 MMHG

## 2019-09-29 DIAGNOSIS — B34.9 VIRAL SYNDROME: Primary | ICD-10-CM

## 2019-09-29 LAB
FLU A ANTIGEN: NEGATIVE
FLU B ANTIGEN: NEGATIVE
GROUP A STREP CULTURE, REFLEX: NEGATIVE
REFLEX THROAT C + S: NORMAL

## 2019-09-29 PROCEDURE — 87070 CULTURE OTHR SPECIMN AEROBIC: CPT

## 2019-09-29 PROCEDURE — 87880 STREP A ASSAY W/OPTIC: CPT

## 2019-09-29 PROCEDURE — 87804 INFLUENZA ASSAY W/OPTIC: CPT

## 2019-09-29 PROCEDURE — 99284 EMERGENCY DEPT VISIT MOD MDM: CPT

## 2019-09-29 RX ORDER — IBUPROFEN 800 MG/1
800 TABLET ORAL EVERY 8 HOURS PRN
Qty: 30 TABLET | Refills: 0 | Status: SHIPPED | OUTPATIENT
Start: 2019-09-29

## 2019-09-29 RX ORDER — DEXTROMETHORPHAN HYDROBROMIDE AND PROMETHAZINE HYDROCHLORIDE 15; 6.25 MG/5ML; MG/5ML
5 SYRUP ORAL 4 TIMES DAILY PRN
Qty: 120 ML | Refills: 0 | Status: SHIPPED | OUTPATIENT
Start: 2019-09-29 | End: 2019-10-06

## 2019-09-29 ASSESSMENT — PAIN DESCRIPTION - DESCRIPTORS: DESCRIPTORS: HEADACHE

## 2019-09-29 ASSESSMENT — ENCOUNTER SYMPTOMS
NAUSEA: 0
DIARRHEA: 0
COUGH: 0
BACK PAIN: 0
SHORTNESS OF BREATH: 0
EYE DISCHARGE: 0
VOMITING: 0
WHEEZING: 0
RHINORRHEA: 0
SORE THROAT: 1
COLOR CHANGE: 0
EYE PAIN: 0
ABDOMINAL PAIN: 0
EYE ITCHING: 0

## 2019-09-29 ASSESSMENT — PAIN SCALES - GENERAL: PAINLEVEL_OUTOF10: 6

## 2019-09-29 ASSESSMENT — PAIN DESCRIPTION - LOCATION: LOCATION: HEAD

## 2019-09-29 ASSESSMENT — PAIN DESCRIPTION - ONSET: ONSET: GRADUAL

## 2019-10-01 LAB — THROAT/NOSE CULTURE: NORMAL

## 2019-10-05 ENCOUNTER — HOSPITAL ENCOUNTER (EMERGENCY)
Age: 29
Discharge: HOME | End: 2019-10-05
Payer: MEDICARE

## 2019-10-05 VITALS
DIASTOLIC BLOOD PRESSURE: 59 MMHG | SYSTOLIC BLOOD PRESSURE: 121 MMHG | HEART RATE: 80 BPM | OXYGEN SATURATION: 98 % | TEMPERATURE: 97.2 F | BODY MASS INDEX: 35.55 KG/M2 | WEIGHT: 240 LBS | HEIGHT: 69 IN | RESPIRATION RATE: 16 BRPM

## 2019-10-05 DIAGNOSIS — J40 BRONCHITIS: Primary | ICD-10-CM

## 2019-10-05 PROCEDURE — 99212 OFFICE O/P EST SF 10 MIN: CPT

## 2019-10-05 PROCEDURE — 99213 OFFICE O/P EST LOW 20 MIN: CPT | Performed by: NURSE PRACTITIONER

## 2019-10-05 RX ORDER — PREDNISONE 20 MG/1
20 TABLET ORAL 2 TIMES DAILY
Qty: 10 TABLET | Refills: 0 | Status: SHIPPED | OUTPATIENT
Start: 2019-10-05 | End: 2019-10-10

## 2019-10-05 ASSESSMENT — ENCOUNTER SYMPTOMS
NAUSEA: 0
TROUBLE SWALLOWING: 0
BACK PAIN: 0
SINUS PRESSURE: 0
VOMITING: 0
SHORTNESS OF BREATH: 0
COUGH: 1
RHINORRHEA: 1
SORE THROAT: 0
DIARRHEA: 0
SINUS CONGESTION: 0

## 2019-10-05 ASSESSMENT — PAIN DESCRIPTION - PAIN TYPE: TYPE: ACUTE PAIN

## 2019-10-05 ASSESSMENT — PAIN SCALES - GENERAL: PAINLEVEL_OUTOF10: 6

## 2019-11-25 ENCOUNTER — HOSPITAL ENCOUNTER (EMERGENCY)
Age: 29
Discharge: HOME | End: 2019-11-25
Payer: MEDICARE

## 2019-11-25 VITALS
OXYGEN SATURATION: 99 % | SYSTOLIC BLOOD PRESSURE: 134 MMHG | HEART RATE: 84 BPM | TEMPERATURE: 98.7 F | RESPIRATION RATE: 18 BRPM | DIASTOLIC BLOOD PRESSURE: 74 MMHG

## 2019-11-25 DIAGNOSIS — M77.11 LATERAL EPICONDYLITIS OF RIGHT ELBOW: Primary | ICD-10-CM

## 2019-11-25 PROCEDURE — 99282 EMERGENCY DEPT VISIT SF MDM: CPT

## 2019-11-25 RX ORDER — METHYLPREDNISOLONE 4 MG/1
TABLET ORAL
Qty: 1 KIT | Refills: 0 | Status: SHIPPED | OUTPATIENT
Start: 2019-11-25 | End: 2019-12-01

## 2019-11-25 ASSESSMENT — PAIN SCALES - GENERAL: PAINLEVEL_OUTOF10: 4

## 2020-02-09 ENCOUNTER — HOSPITAL ENCOUNTER (EMERGENCY)
Age: 30
Discharge: HOME | End: 2020-02-09
Payer: MEDICARE

## 2020-02-09 VITALS
RESPIRATION RATE: 18 BRPM | HEART RATE: 70 BPM | TEMPERATURE: 98.3 F | OXYGEN SATURATION: 99 % | SYSTOLIC BLOOD PRESSURE: 137 MMHG | WEIGHT: 220 LBS | HEIGHT: 69 IN | BODY MASS INDEX: 32.58 KG/M2 | DIASTOLIC BLOOD PRESSURE: 67 MMHG

## 2020-02-09 PROCEDURE — 99282 EMERGENCY DEPT VISIT SF MDM: CPT

## 2020-02-09 PROCEDURE — 6370000000 HC RX 637 (ALT 250 FOR IP): Performed by: PHYSICIAN ASSISTANT

## 2020-02-09 RX ORDER — NAPROXEN 250 MG/1
500 TABLET ORAL ONCE
Status: COMPLETED | OUTPATIENT
Start: 2020-02-09 | End: 2020-02-09

## 2020-02-09 RX ORDER — NAPROXEN 500 MG/1
500 TABLET ORAL 2 TIMES DAILY
Qty: 60 TABLET | Refills: 0 | Status: SHIPPED | OUTPATIENT
Start: 2020-02-09 | End: 2020-03-16 | Stop reason: ALTCHOICE

## 2020-02-09 RX ORDER — FLUTICASONE PROPIONATE 50 MCG
1 SPRAY, SUSPENSION (ML) NASAL DAILY
Qty: 1 BOTTLE | Refills: 0 | Status: SHIPPED | OUTPATIENT
Start: 2020-02-09

## 2020-02-09 RX ADMIN — NAPROXEN 500 MG: 250 TABLET ORAL at 10:23

## 2020-02-09 ASSESSMENT — PAIN DESCRIPTION - ORIENTATION: ORIENTATION: LEFT

## 2020-02-09 ASSESSMENT — PAIN DESCRIPTION - PAIN TYPE: TYPE: ACUTE PAIN

## 2020-02-09 ASSESSMENT — PAIN DESCRIPTION - DESCRIPTORS: DESCRIPTORS: NUMBNESS

## 2020-02-09 ASSESSMENT — ENCOUNTER SYMPTOMS
RHINORRHEA: 0
SORE THROAT: 0

## 2020-02-09 ASSESSMENT — PAIN DESCRIPTION - LOCATION: LOCATION: EAR

## 2020-02-09 ASSESSMENT — PAIN SCALES - GENERAL: PAINLEVEL_OUTOF10: 5

## 2020-02-09 NOTE — ED PROVIDER NOTES
Abdominal:      Palpations: Abdomen is soft. Musculoskeletal: Normal range of motion. Skin:     General: Skin is warm and dry. Findings: No rash. Neurological:      Mental Status: She is alert and oriented to person, place, and time. Motor: No abnormal muscle tone. Coordination: Coordination normal.           DIFFERENTIAL DIAGNOSIS:   Differential diagnoses as discussed     DIAGNOSTIC RESULTS     EKG: All EKG's are interpreted by the Emergency Department Physician who eithersigns or Co-signs this chart in the absence of a cardiologist.    None     RADIOLOGY:non-plain filmimages(s) such as CT, Ultrasound and MRI are read by the radiologist.    No orders to display        LABS:     Labs Reviewed - No data to display    EMERGENCY DEPARTMENT COURSE:   Vitals:    Vitals:    02/09/20 0933   BP: 137/67   Pulse: 70   Resp: 18   Temp: 98.3 °F (36.8 °C)   TempSrc: Oral   SpO2: 99%   Weight: 220 lb (99.8 kg)   Height: 5' 9\" (1.753 m)       Patient presents for complaints of left ear pain has been ongoing for the past 2 days. She reports some clear-yellow, watery drainage yesterday has been taking Tylenol without improvement. She reports associated nasal congestion, muffled hearing from the left ear and some ringing in the left ear. Hearing intact to finger rub on exam.  No visualized perforation, though microperforation certainly a differential.  There is no sign of otitis media. Advised avoidance of flushing or foreign body introduction to the ear. Will discharge with Flonase and Naprosyn and advised for recheck in 1 to 2 days. Return precautions discussed for further ED evaluation and patient denied further needs upon discharge. CRITICAL CARE:   None      CONSULTS:  None     PROCEDURES:  None     FINAL IMPRESSION     1.  Non-recurrent acute suppurative otitis media of left ear with spontaneous rupture of tympanic membrane          DISPOSITION/PLAN   Discharge     PATIENT REFERRED TO:  Alona Galaviz

## 2020-02-09 NOTE — ED NOTES
Patient presents to the ED with complaints of having left side ear pain and drainage. She has no other complaints at this time.       Mary Lao LPN  [id number]

## 2020-02-14 ENCOUNTER — HOSPITAL ENCOUNTER (OUTPATIENT)
Age: 30
Setting detail: SPECIMEN
Discharge: HOME | End: 2020-02-14
Payer: MEDICARE

## 2020-02-18 LAB
CULTURE: ABNORMAL
CULTURE: ABNORMAL
DIRECT EXAM: ABNORMAL
DIRECT EXAM: ABNORMAL
Lab: ABNORMAL
SPECIMEN DESCRIPTION: ABNORMAL

## 2020-03-16 ENCOUNTER — HOSPITAL ENCOUNTER (EMERGENCY)
Age: 30
Discharge: HOME | End: 2020-03-16
Payer: MEDICARE

## 2020-03-16 VITALS
WEIGHT: 270 LBS | DIASTOLIC BLOOD PRESSURE: 60 MMHG | OXYGEN SATURATION: 97 % | HEIGHT: 69 IN | TEMPERATURE: 97.8 F | RESPIRATION RATE: 20 BRPM | HEART RATE: 70 BPM | SYSTOLIC BLOOD PRESSURE: 129 MMHG | BODY MASS INDEX: 39.99 KG/M2

## 2020-03-16 PROCEDURE — 99213 OFFICE O/P EST LOW 20 MIN: CPT

## 2020-03-16 RX ORDER — SULFAMETHOXAZOLE AND TRIMETHOPRIM 800; 160 MG/1; MG/1
1 TABLET ORAL 2 TIMES DAILY
Qty: 20 TABLET | Refills: 0 | Status: SHIPPED | OUTPATIENT
Start: 2020-03-19 | End: 2020-03-29

## 2020-03-16 RX ORDER — ALBUTEROL SULFATE 0.63 MG/3ML
1 SOLUTION RESPIRATORY (INHALATION) EVERY 6 HOURS PRN
Qty: 270 ML | Refills: 0 | Status: SHIPPED | OUTPATIENT
Start: 2020-03-16 | End: 2022-10-23

## 2020-03-16 ASSESSMENT — ENCOUNTER SYMPTOMS
CHEST TIGHTNESS: 0
STRIDOR: 0
COUGH: 1
SORE THROAT: 0
VOMITING: 0
WHEEZING: 0
SHORTNESS OF BREATH: 0
RHINORRHEA: 0
NAUSEA: 0
DIARRHEA: 0

## 2020-03-16 NOTE — ED PROVIDER NOTES
Pulses: Normal pulses. Heart sounds: Normal heart sounds. No murmur. No friction rub. No gallop. Pulmonary:      Effort: Pulmonary effort is normal. No respiratory distress. Breath sounds: Normal breath sounds. No stridor. No wheezing, rhonchi or rales. Chest:      Chest wall: No tenderness. Musculoskeletal: Normal range of motion. Skin:     General: Skin is warm. Findings: No erythema or rash. Neurological:      General: No focal deficit present. Mental Status: She is alert and oriented to person, place, and time. Sensory: No sensory deficit. Psychiatric:         Mood and Affect: Mood normal.         Behavior: Behavior normal.         Thought Content: Thought content normal.         Judgment: Judgment normal.         DIAGNOSTIC RESULTS     Labs:No results found for this visit on 03/16/20. IMAGING:    No orders to display     URGENT CARE COURSE:     Vitals:    03/16/20 1017   BP: 129/60   Pulse: 70   Resp: 20   Temp: 97.8 °F (36.6 °C)   TempSrc: Temporal   SpO2: 97%   Weight: 270 lb (122.5 kg)   Height: 5' 9\" (1.753 m)       Medications - No data to display         PROCEDURES:  None    FINAL IMPRESSION      1. Cough    2. Upper respiratory tract infection, unspecified type          DISPOSITION/ PLAN   Patient is discharged home with prescription for albuterol nebulizer as well as compressor kit. Discussed with patient that at this time do suspect possible viral involvement, which can take up to 1 full week to resolve on its own. May fill antibiotic prescription within 4 days if symptoms are not improving. Follow-up with primary care provider within 1 week if symptoms do not seem to be improving.         PATIENT REFERRED TO:  ABHAY Mclean - SIA  [id number] [facility] [id number]      DISCHARGE MEDICATIONS:  Discharge Medication List as of 3/16/2020 10:51 AM      START taking these medications    Details   Respiratory Therapy Supplies (PEDIATRIC COMPRESSOR/NEBULIZER) KIT 1 each by Does not apply route once for 1 dose, Disp-1 kit, R-0Print      sulfamethoxazole-trimethoprim (BACTRIM DS) 800-160 MG per tablet Take 1 tablet by mouth 2 times daily for 10 days, Disp-20 tablet, R-0Print             Discharge Medication List as of 3/16/2020 10:51 AM          Discharge Medication List as of 3/16/2020 10:51 AM      CONTINUE these medications which have CHANGED    Details   albuterol (ACCUNEB) 0.63 MG/3ML nebulizer solution Take 3 mLs by nebulization every 6 hours as needed for Wheezing, Disp-270 mL, R-0Print             ABHAY Polanco NP    (Please note that portions of this note were completed with a voice recognition program. Efforts were made to edit the dictations but occasionally words are mis-transcribed.)         ABHAY Ahmadi NP  03/16/20 0319

## 2020-09-14 ENCOUNTER — HOSPITAL ENCOUNTER (EMERGENCY)
Age: 30
Discharge: HOME | End: 2020-09-14
Payer: MEDICARE

## 2020-09-14 VITALS
WEIGHT: 260 LBS | HEART RATE: 70 BPM | BODY MASS INDEX: 38.51 KG/M2 | TEMPERATURE: 97.8 F | HEIGHT: 69 IN | DIASTOLIC BLOOD PRESSURE: 73 MMHG | RESPIRATION RATE: 16 BRPM | SYSTOLIC BLOOD PRESSURE: 135 MMHG | OXYGEN SATURATION: 97 %

## 2020-09-14 LAB
GROUP A STREP CULTURE, REFLEX: NEGATIVE
REFLEX THROAT C + S: NORMAL

## 2020-09-14 PROCEDURE — 99214 OFFICE O/P EST MOD 30 MIN: CPT | Performed by: NURSE PRACTITIONER

## 2020-09-14 PROCEDURE — U0003 INFECTIOUS AGENT DETECTION BY NUCLEIC ACID (DNA OR RNA); SEVERE ACUTE RESPIRATORY SYNDROME CORONAVIRUS 2 (SARS-COV-2) (CORONAVIRUS DISEASE [COVID-19]), AMPLIFIED PROBE TECHNIQUE, MAKING USE OF HIGH THROUGHPUT TECHNOLOGIES AS DESCRIBED BY CMS-2020-01-R: HCPCS

## 2020-09-14 PROCEDURE — 99213 OFFICE O/P EST LOW 20 MIN: CPT

## 2020-09-14 PROCEDURE — 87880 STREP A ASSAY W/OPTIC: CPT

## 2020-09-14 PROCEDURE — 87070 CULTURE OTHR SPECIMN AEROBIC: CPT

## 2020-09-14 RX ORDER — AZITHROMYCIN 250 MG/1
TABLET, FILM COATED ORAL
Qty: 1 PACKET | Refills: 0 | Status: SHIPPED | OUTPATIENT
Start: 2020-09-14 | End: 2020-09-18

## 2020-09-14 ASSESSMENT — ENCOUNTER SYMPTOMS
SHORTNESS OF BREATH: 0
SORE THROAT: 1
SINUS PRESSURE: 0
CHEST TIGHTNESS: 0
EYE REDNESS: 0
DIARRHEA: 1
NAUSEA: 0
EYE ITCHING: 0
COUGH: 1
VOMITING: 0
ABDOMINAL PAIN: 0

## 2020-09-14 ASSESSMENT — PAIN DESCRIPTION - PAIN TYPE: TYPE: ACUTE PAIN

## 2020-09-14 ASSESSMENT — PAIN DESCRIPTION - LOCATION: LOCATION: THROAT

## 2020-09-14 ASSESSMENT — PAIN SCALES - GENERAL: PAINLEVEL_OUTOF10: 7

## 2020-09-14 NOTE — ED PROVIDER NOTES
[address] [facility] Encounter      [address] [facility]       Chief Complaint   Patient presents with    Chest Congestion    Pharyngitis       Nurses Notes reviewed and I agree except as noted in the HPI. HISTORY OF PRESENT ILLNESS   Marco A Lambert is a 31 y.o. female who presents for evaluation. She states for the last 2 to 3 days she has had sore throat with cough and congestion. She has not taken any medications over-the-counter for symptoms. Concern for COVID-19. She reports that 3 of her children here with her today are sick with similar symptoms. REVIEW OF SYSTEMS     Review of Systems   Constitutional: Positive for chills and fatigue. Negative for fever. HENT: Positive for congestion, ear pain, postnasal drip and sore throat. Negative for sinus pressure. Eyes: Negative for redness and itching. Respiratory: Positive for cough (productive). Negative for chest tightness and shortness of breath. Cardiovascular: Negative for chest pain. Gastrointestinal: Positive for diarrhea. Negative for abdominal pain, nausea and vomiting. Genitourinary: Negative for dysuria. Musculoskeletal: Negative for joint swelling and myalgias. Skin: Negative for rash. Allergic/Immunologic: Positive for environmental allergies. Negative for food allergies. Neurological: Negative for dizziness and headaches. Hematological: Negative for adenopathy. PAST MEDICAL HISTORY         Diagnosis Date    Asthma     Nicotine dependence        SURGICAL HISTORY     Patient  has a past surgical history that includes  section and thyroglossal duct excision (N/A, 2019).     CURRENT MEDICATIONS       Discharge Medication List as of 2020 12:26 PM      CONTINUE these medications which have NOT CHANGED    Details   albuterol (ACCUNEB) 0.63 MG/3ML nebulizer solution Take 3 mLs by nebulization every 6 hours as needed for Wheezing, Disp-270 mL,R-0Print      Respiratory Therapy Supplies (PEDIATRIC COMPRESSOR/NEBULIZER) KIT 1 each by Does not apply route once for 1 dose, Disp-1 kit,R-0Print      fluticasone (FLONASE) 50 MCG/ACT nasal spray 1 spray by Each Nostril route daily, Disp-1 Bottle, R-0Print      ibuprofen (ADVIL;MOTRIN) 800 MG tablet Take 1 tablet by mouth every 8 hours as needed for Pain, Disp-30 tablet, R-0Print      ATROVENT HFA 17 MCG/ACT inhaler 2 puffs as needed , R-1, DAWHistorical Med      albuterol sulfate  (90 Base) MCG/ACT inhaler Inhale 2 puffs into the lungs every 6 hours as needed for [address]     Patient is is allergic to rocephin [ceftriaxone] and toradol [ketorolac tromethamine]. FAMILY HISTORY     Patient'sfamily history includes Asthma in her brother and sister; Diabetes in her mother; High Blood Pressure in her mother; Other in her father. SOCIAL HISTORY     Patient  reports that she quit smoking about 19 months ago. She smoked 0.00 packs per day for 9.00 years. She has never used smokeless tobacco. She reports that she does not drink alcohol or use drugs. PHYSICAL EXAM     ED TRIAGE VITALS  BP: 135/73, Temp: 97.8 °F (36.6 °C), Pulse: 70, Resp: 16, SpO2: 97 %  Physical Exam  Vitals signs and nursing note reviewed. Constitutional:       General: She is not in acute distress. Appearance: Normal appearance. She is well-developed and well-groomed. HENT:      Head: Normocephalic and atraumatic. Right Ear: Tympanic membrane, ear canal and external ear normal.      Left Ear: Tympanic membrane, ear canal and external ear normal.      Nose: Nose normal.      Mouth/Throat:      Lips: Pink. Mouth: Mucous membranes are moist.      Pharynx: Pharyngeal swelling and posterior oropharyngeal erythema present. Eyes:      Conjunctiva/sclera: Conjunctivae normal.      Right eye: Right conjunctiva is not injected. Left eye: Left conjunctiva is not injected.       Pupils: Pupils are equal.   Neck: Musculoskeletal: Normal range of motion. Cardiovascular:      Rate and Rhythm: Normal rate. Heart sounds: Normal heart sounds. Pulmonary:      Effort: Pulmonary effort is normal. No respiratory distress. Breath sounds: Normal breath sounds. No decreased breath sounds, wheezing or rhonchi. Abdominal:      General: Abdomen is flat. Bowel sounds are normal.      Palpations: Abdomen is soft. Tenderness: There is no abdominal tenderness. Lymphadenopathy:      Cervical: No cervical adenopathy. Skin:     General: Skin is warm and dry. Findings: No rash. Neurological:      Mental Status: She is alert and oriented to person, place, and time. Psychiatric:         Mood and Affect: Mood normal.         Speech: Speech normal.         Behavior: Behavior is cooperative. DIAGNOSTIC RESULTS   Labs:  Abnormal Labs Reviewed - No abnormal labs to display     IMAGING:  No orders to display     URGENT CARE COURSE:     Vitals:    09/14/20 1102   BP: 135/73   Pulse: 70   Resp: 16   Temp: 97.8 °F (36.6 °C)   SpO2: 97%   Weight: 260 lb (117.9 kg)   Height: 5' 9\" (1.753 m)       Medications - No data to display  PROCEDURES:  FINALIMPRESSION      1. URI with cough and congestion    2. Encounter for laboratory testing for COVID-19 virus        DISPOSITION/PLAN   DISPOSITION Decision To Discharge 09/14/2020 12:23:52 PM    COVID-19 test result will be known in 4-7days. Self isolate until test result is known. If applicable, work/school note has been provided to be off until COVID-19 test results. Follow with family provider for further work/school note needs. If you develop severe respiratory distress (trouble breathing) or have a medical emergency, go to your nearest emergency room or call 911.       ED Course as of Sep 15 1102   Mon Sep 14, 2020   1223 GROUP A STREP CULTURE, REFLEX: Negative [HA]   1223 Strep A culture, throat [HA]   1223 [address] [facility]      ED Course User Index  [facility]

## 2020-09-15 ENCOUNTER — CARE COORDINATION (OUTPATIENT)
Dept: CARE COORDINATION | Age: 30
End: 2020-09-15

## 2020-09-16 LAB — THROAT/NOSE CULTURE: NORMAL

## 2020-09-17 LAB — SARS-COV-2: NOT DETECTED

## 2021-03-02 ENCOUNTER — APPOINTMENT (OUTPATIENT)
Dept: GENERAL RADIOLOGY | Age: 31
End: 2021-03-02
Payer: MEDICARE

## 2021-03-02 ENCOUNTER — HOSPITAL ENCOUNTER (EMERGENCY)
Age: 31
Discharge: HOME | End: 2021-03-02
Attending: EMERGENCY MEDICINE
Payer: MEDICARE

## 2021-03-02 VITALS
RESPIRATION RATE: 16 BRPM | WEIGHT: 265 LBS | OXYGEN SATURATION: 99 % | HEIGHT: 69 IN | TEMPERATURE: 98.2 F | SYSTOLIC BLOOD PRESSURE: 110 MMHG | BODY MASS INDEX: 39.25 KG/M2 | HEART RATE: 70 BPM | DIASTOLIC BLOOD PRESSURE: 56 MMHG

## 2021-03-02 DIAGNOSIS — S39.012A LUMBOSACRAL STRAIN, INITIAL ENCOUNTER: Primary | ICD-10-CM

## 2021-03-02 LAB
BILIRUBIN URINE: NEGATIVE
BLOOD, URINE: NEGATIVE
CHARACTER, URINE: CLEAR
COLOR: YELLOW
GLUCOSE URINE: NEGATIVE MG/DL
KETONES, URINE: NEGATIVE
LEUKOCYTE ESTERASE, URINE: NEGATIVE
NITRITE, URINE: NEGATIVE
PH UA: 6.5 (ref 5–9)
PREGNANCY, URINE: NEGATIVE
PROTEIN UA: NEGATIVE
SPECIFIC GRAVITY, URINE: 1.01 (ref 1–1.03)
UROBILINOGEN, URINE: 0.2 EU/DL (ref 0–1)

## 2021-03-02 PROCEDURE — 81003 URINALYSIS AUTO W/O SCOPE: CPT

## 2021-03-02 PROCEDURE — 72170 X-RAY EXAM OF PELVIS: CPT

## 2021-03-02 PROCEDURE — 6370000000 HC RX 637 (ALT 250 FOR IP): Performed by: EMERGENCY MEDICINE

## 2021-03-02 PROCEDURE — 72100 X-RAY EXAM L-S SPINE 2/3 VWS: CPT

## 2021-03-02 PROCEDURE — 99282 EMERGENCY DEPT VISIT SF MDM: CPT

## 2021-03-02 PROCEDURE — 81025 URINE PREGNANCY TEST: CPT

## 2021-03-02 RX ORDER — NAPROXEN 500 MG/1
500 TABLET ORAL 2 TIMES DAILY
Qty: 14 TABLET | Refills: 0 | Status: SHIPPED | OUTPATIENT
Start: 2021-03-02 | End: 2021-10-18

## 2021-03-02 RX ORDER — LIDOCAINE 4 G/G
1 PATCH TOPICAL ONCE
Status: DISCONTINUED | OUTPATIENT
Start: 2021-03-02 | End: 2021-03-02 | Stop reason: HOSPADM

## 2021-03-02 RX ORDER — LIDOCAINE 4 G/G
1 PATCH TOPICAL DAILY
Qty: 14 PATCH | Refills: 0 | Status: SHIPPED | OUTPATIENT
Start: 2021-03-02 | End: 2021-03-16

## 2021-03-02 RX ORDER — IBUPROFEN 800 MG/1
800 TABLET ORAL ONCE
Status: COMPLETED | OUTPATIENT
Start: 2021-03-02 | End: 2021-03-02

## 2021-03-02 RX ADMIN — IBUPROFEN 800 MG: 800 TABLET, FILM COATED ORAL at 13:37

## 2021-03-02 ASSESSMENT — PAIN DESCRIPTION - ORIENTATION: ORIENTATION: LOWER

## 2021-03-02 ASSESSMENT — PAIN DESCRIPTION - LOCATION
LOCATION: BACK
LOCATION: BACK

## 2021-03-02 ASSESSMENT — PAIN SCALES - GENERAL: PAINLEVEL_OUTOF10: 8

## 2021-03-02 NOTE — ED NOTES
Patient presents to ED with complaint of lower back pain s/p fall a month ago. Patient states pain has not releived after a month. Patient rates pain 8 out of 10. Dr. Maddy Blandon at bedside. Respirations unlabored.      Lurdes Villa RN  03/02/21 2725

## 2021-03-03 ASSESSMENT — ENCOUNTER SYMPTOMS
ABDOMINAL PAIN: 0
VOMITING: 0
BACK PAIN: 1

## 2021-03-04 NOTE — ED PROVIDER NOTES
[facility] ENCOUNTER          Pt Name: Jerri Bernal  MRN: [id number]  [place] 1990  Date of evaluation: 3/2/2021  Emergency Physician: Carmella Javier, [facility]       Chief Complaint   Patient presents with    Back Pain     History obtained from the patient. Misha MITTAL  Jerri Bernal is a 31 y.o. female who presents to the emergency department for evaluation of back pain. Pain is localized the the left lower back/ gluteal area. Pain has been ongoing for the last 3 weeks. Pain is worse with ambulation and bending. She states the onset was after she slipped on stairs. She states she was walking her foot slipped and she fell onto her buttocks striking her bottom/stairs. She currently rates the pain as mild to moderate. No abdominal pain no changes in urination no change in bowel habits no numbness no tingling no difficulty ambulating. No lower extremity weakness. The patient has no other acute complaints at this time. REVIEW OF SYSTEMS   Review of Systems   Constitutional: Negative for chills and fever. Gastrointestinal: Negative for abdominal pain and vomiting. Endocrine: Negative for polyuria. Genitourinary: Negative for decreased urine volume, dysuria, urgency, vaginal discharge and vaginal pain. Musculoskeletal: Positive for back pain. Negative for gait problem and myalgias. Neurological: Negative for weakness and light-headedness. Hematological: Does not bruise/bleed easily.          PAST MEDICAL AND SURGICAL HISTORY     Past Medical History:   Diagnosis Date    Asthma     Nicotine dependence      Past Surgical History:   Procedure Laterality Date     SECTION      x3    THYROGLOSSAL DUCT EXCISION N/A 2019    EXCISION OF THYROGLOSSAL DUCT OR SINUS performed by Navin Marcelo MD at [address]   No current facility-administered medications for this encounter.      Current Outpatient Medications:     naproxen (NAPROSYN) 500 MG tablet, Take 1 tablet by mouth 2 times daily, Disp: 14 tablet, Rfl: 0    lidocaine 4 % external patch, Place 1 patch onto the skin daily for 14 days, Disp: 14 patch, Rfl: 0    albuterol (ACCUNEB) 0.63 MG/3ML nebulizer solution, Take 3 mLs by nebulization every 6 hours as needed for Wheezing, Disp: 270 mL, Rfl: 0    Respiratory Therapy Supplies (PEDIATRIC COMPRESSOR/NEBULIZER) KIT, 1 each by Does not apply route once for 1 dose, Disp: 1 kit, Rfl: 0    fluticasone (FLONASE) 50 MCG/ACT nasal spray, 1 spray by Each Nostril route daily, Disp: 1 Bottle, Rfl: 0    ibuprofen (ADVIL;MOTRIN) 800 MG tablet, Take 1 tablet by mouth every 8 hours as needed for Pain, Disp: 30 tablet, Rfl: 0    ATROVENT HFA 17 MCG/ACT inhaler, 2 puffs as needed , Disp: , Rfl: 1    albuterol sulfate  (90 Base) MCG/ACT inhaler, Inhale 2 puffs into the lungs every 6 hours as needed for Wheezing, Disp: , Rfl:       SOCIAL HISTORY     Social History     Social History Narrative    Not on file     Social History     Tobacco Use    Smoking status: Former Smoker     Packs/day: 0.00     Years: 9.00     Pack years: 0.00     Quit date: 2019     Years since quittin.0    Smokeless tobacco: Never Used   Substance Use Topics    Alcohol use: No    Drug use: No         ALLERGIES     Allergies   Allergen Reactions    Rocephin [Ceftriaxone] Anaphylaxis    Toradol [Ketorolac Tromethamine] Hives         FAMILY HISTORY     Family History   Problem Relation Age of Onset    Diabetes Mother     High Blood Pressure Mother     Other Father         Sickle Cell Trait    Asthma Sister     Asthma Brother          PHYSICAL EXAM     ED Triage Vitals [21 1332]   BP Temp Temp Source Pulse Resp SpO2 Height Weight   (!) 110/56 98.2 °F (36.8 °C) Oral 70 16 99 % 5' 9\" (1.753 m) 265 lb (120.2 kg)         Additional Vital Signs:  Vitals:    21 1332   BP: (!) 110/56 consider the following emergency medical conditions: Lumbar sprain, strain, SI joint, coccydynia, muscle spasm, contusion, fracture, UTI, appendicitis, PID although some of these diagnoses are unlikely they were considered in my medical decision making. Plan: UA, U preg, Lumbar/pelvis X-ray Symptomatic treatment with motrin and lidoderm then reassess         ED RESULTS   Laboratory results:  Labs Reviewed   PREGNANCY, URINE   URINE RT REFLEX TO CULTURE       Radiologic studies results:  XR PELVIS (1-2 VIEWS)   Final Result   Normal pelvis. **This report has been created using voice recognition software. It may contain minor errors which are inherent in voice recognition technology. **      Final report electronically signed by Dr. Holland Whitaker on 3/2/2021 2:02 PM      XR LUMBAR SPINE (2-3 VIEWS)   Final Result   1. Minimal vertebral body spondylosis in the lower thoracic and upper lumbar spine. 2. Otherwise normal lumbar spine. No fracture. Disc spaces well-maintained. **This report has been created using voice recognition software. It may contain minor errors which are inherent in voice recognition technology. **      Final report electronically signed by Dr. Holland Whitaker on 3/2/2021 2:02 PM          ED Medications administered this visit:   Medications   ibuprofen (ADVIL;MOTRIN) tablet 800 mg (800 mg Oral Given 3/2/21 1337)         ED COURSE      The patient presents with back pain. Based on clinical examination, as well as history of present illness, there is no evidence of cauda equina, diskitis, spinal epidural abscess, spinal hematoma, or spinal metastasis. There is also no evidence of aortic dissection or AAA. These diagnoses, amongst a broad differential, were carefully considered in the evaluation of this patient. The diagnosis, extensive differential diagnosis, laboratory and imaging findings were discussed at the bedside.   The patient was an active participant in their care. They are agreeable to the plan of care. All questions and concerns were addressed at the time of the encounter. MEDICATION CHANGES     DISCHARGE MEDICATIONS:  Discharge Medication List as of 3/2/2021  3:24 PM      START taking these medications    Details   naproxen (NAPROSYN) 500 MG tablet Take 1 tablet by mouth 2 times daily, Disp-14 tablet, R-0Normal      lidocaine 4 % external patch Place 1 patch onto the skin daily for 14 days, Transdermal, DAILY Starting Tue 3/2/2021, Until Tue 3/16/2021, For 14 days, Disp-14 patch, R-0, Normal                  FINAL DISPOSITION     Final diagnoses:   Lumbosacral strain, initial encounter     Condition: condition: good  Dispo: Discharge to home    PATIENT REFERRED TO:  ABHAY Ewing CNP  [address] [place] [address] [facility]  [phone]    Schedule an appointment as soon as possible for a visit in 1 week        Critical Care Time   None      This transcription was electronically signed. Parts of this transcriptions may have been dictated by use of voice recognition software and electronically transcribed, and parts may have been transcribed with the assistance of an ED scribe. The transcription may contain errors not detected in proofreading.     Electronically Signed: Patt Howe, 03/03/21, 8:44 PM      Patt Howe DO  03/03/21 2050

## 2021-05-29 ENCOUNTER — HOSPITAL ENCOUNTER (EMERGENCY)
Age: 31
Discharge: HOME | End: 2021-05-29
Attending: EMERGENCY MEDICINE
Payer: MEDICARE

## 2021-05-29 ENCOUNTER — APPOINTMENT (OUTPATIENT)
Dept: GENERAL RADIOLOGY | Age: 31
End: 2021-05-29
Payer: MEDICARE

## 2021-05-29 VITALS
HEART RATE: 74 BPM | WEIGHT: 260 LBS | SYSTOLIC BLOOD PRESSURE: 149 MMHG | OXYGEN SATURATION: 100 % | HEIGHT: 69 IN | BODY MASS INDEX: 38.51 KG/M2 | RESPIRATION RATE: 16 BRPM | DIASTOLIC BLOOD PRESSURE: 76 MMHG | TEMPERATURE: 98.7 F

## 2021-05-29 DIAGNOSIS — J45.901 ASTHMA WITH ACUTE EXACERBATION, UNSPECIFIED ASTHMA SEVERITY, UNSPECIFIED WHETHER PERSISTENT: Primary | ICD-10-CM

## 2021-05-29 DIAGNOSIS — H65.03 NON-RECURRENT ACUTE SEROUS OTITIS MEDIA OF BOTH EARS: ICD-10-CM

## 2021-05-29 PROCEDURE — 94640 AIRWAY INHALATION TREATMENT: CPT

## 2021-05-29 PROCEDURE — 99283 EMERGENCY DEPT VISIT LOW MDM: CPT

## 2021-05-29 PROCEDURE — 6360000002 HC RX W HCPCS: Performed by: STUDENT IN AN ORGANIZED HEALTH CARE EDUCATION/TRAINING PROGRAM

## 2021-05-29 PROCEDURE — 6370000000 HC RX 637 (ALT 250 FOR IP): Performed by: STUDENT IN AN ORGANIZED HEALTH CARE EDUCATION/TRAINING PROGRAM

## 2021-05-29 PROCEDURE — 71046 X-RAY EXAM CHEST 2 VIEWS: CPT

## 2021-05-29 RX ORDER — DEXAMETHASONE 4 MG/1
6 TABLET ORAL ONCE
Status: COMPLETED | OUTPATIENT
Start: 2021-05-29 | End: 2021-05-29

## 2021-05-29 RX ORDER — AMOXICILLIN 250 MG/1
250 CAPSULE ORAL 3 TIMES DAILY
COMMUNITY
End: 2021-11-23

## 2021-05-29 RX ORDER — CETIRIZINE HYDROCHLORIDE 10 MG/1
10 TABLET ORAL DAILY
Qty: 30 TABLET | Refills: 0 | Status: SHIPPED | OUTPATIENT
Start: 2021-05-29 | End: 2021-06-28

## 2021-05-29 RX ORDER — IPRATROPIUM BROMIDE AND ALBUTEROL SULFATE 2.5; .5 MG/3ML; MG/3ML
1 SOLUTION RESPIRATORY (INHALATION) ONCE
Status: COMPLETED | OUTPATIENT
Start: 2021-05-29 | End: 2021-05-29

## 2021-05-29 RX ORDER — CETIRIZINE HYDROCHLORIDE 10 MG/1
10 TABLET ORAL ONCE
Status: COMPLETED | OUTPATIENT
Start: 2021-05-29 | End: 2021-05-29

## 2021-05-29 RX ADMIN — DEXAMETHASONE 6 MG: 4 TABLET ORAL at 21:02

## 2021-05-29 RX ADMIN — CETIRIZINE HYDROCHLORIDE 10 MG: 10 TABLET, FILM COATED ORAL at 21:02

## 2021-05-29 RX ADMIN — IPRATROPIUM BROMIDE AND ALBUTEROL SULFATE 1 AMPULE: .5; 3 SOLUTION RESPIRATORY (INHALATION) at 20:53

## 2021-05-29 ASSESSMENT — ENCOUNTER SYMPTOMS
ABDOMINAL PAIN: 0
SHORTNESS OF BREATH: 1
EYE PAIN: 0
DIARRHEA: 0
COUGH: 0
VOMITING: 0
EYE REDNESS: 0
SINUS PRESSURE: 1
COLOR CHANGE: 0
WHEEZING: 1
CHEST TIGHTNESS: 1
SINUS PAIN: 1
NAUSEA: 0

## 2021-05-29 ASSESSMENT — PAIN DESCRIPTION - LOCATION: LOCATION: HEAD;EAR

## 2021-05-29 ASSESSMENT — PAIN DESCRIPTION - FREQUENCY: FREQUENCY: CONTINUOUS

## 2021-05-29 ASSESSMENT — PAIN DESCRIPTION - PAIN TYPE: TYPE: ACUTE PAIN

## 2021-05-30 NOTE — ED TRIAGE NOTES
Pt presents to the ED via ED lobby with c/o asthma. Pt states she works in a [employer] environment and she feels like her allergies are worse than normal. Pt is also concerned that her asthma is about to \"flare up. \" Pt is currently taking amoxicillin for a sinus infection. VSS, respirations even and unlabored.

## 2021-05-30 NOTE — ED PROVIDER NOTES
[place] ENCOUNTER        Pt Name: Kimberly Burch  MRN: [id number]  [place] 1990  Date of evaluation: 2021  Treating Resident Physician: Nataly Escobar DO  Supervising Physician: Nato Corral       Chief Complaint   Patient presents with   [place] Post    Asthma     History obtained from the patient. [place] MITTAL  Kimberly Burch is a 31 y.o. female who presents to the emergency department for evaluation of chest tightness and shortness of breath along with left ear pain and fullness. Patient says this has been worsening over the last 1 to 2 weeks. She thinks this may be due to her job at a [employer] facility. She is currently taking an antibiotic for sinusitis. She is experiencing associated wheezing when patient takes a deep breath. No palpitations or lower extremity swelling. Denies fevers. Intermittent occasional headache that she treats with over-the-counter medications. The patient has no other acute complaints at this time. REVIEW OF SYSTEMS   Review of Systems   Constitutional: Negative for chills and fever. HENT: Positive for ear pain, sinus pressure and sinus pain. Eyes: Negative for pain and redness. Respiratory: Positive for chest tightness, shortness of breath and wheezing. Negative for cough. Cardiovascular: Negative for palpitations and leg swelling. Gastrointestinal: Negative for abdominal pain, diarrhea, nausea and vomiting. Genitourinary: Negative for difficulty urinating and dysuria. Musculoskeletal: Negative for neck pain and neck stiffness. Skin: Negative for color change and rash. Neurological: Negative for dizziness, numbness and headaches.          PAST MEDICAL AND SURGICAL HISTORY     Past Medical History:   Diagnosis Date    Asthma     Nicotine dependence      Past Surgical History:   Procedure Laterality Date     SECTION x3    THYROGLOSSAL DUCT EXCISION N/A 2019    EXCISION OF THYROGLOSSAL DUCT OR SINUS performed by Jeff Ron MD at [address]   No current facility-administered medications for this encounter.     Current Outpatient Medications:     amoxicillin (AMOXIL) 250 MG capsule, Take 250 mg by mouth 3 times daily, Disp: , Rfl:     cetirizine (ZYRTEC) 10 MG tablet, Take 1 tablet by mouth daily, Disp: 30 tablet, Rfl: 0    naproxen (NAPROSYN) 500 MG tablet, Take 1 tablet by mouth 2 times daily, Disp: 14 tablet, Rfl: 0    albuterol (ACCUNEB) 0.63 MG/3ML nebulizer solution, Take 3 mLs by nebulization every 6 hours as needed for Wheezing, Disp: 270 mL, Rfl: 0    Respiratory Therapy Supplies (PEDIATRIC COMPRESSOR/NEBULIZER) KIT, 1 each by Does not apply route once for 1 dose, Disp: 1 kit, Rfl: 0    fluticasone (FLONASE) 50 MCG/ACT nasal spray, 1 spray by Each Nostril route daily, Disp: 1 Bottle, Rfl: 0    ibuprofen (ADVIL;MOTRIN) 800 MG tablet, Take 1 tablet by mouth every 8 hours as needed for Pain, Disp: 30 tablet, Rfl: 0    ATROVENT HFA 17 MCG/ACT inhaler, 2 puffs as needed , Disp: , Rfl: 1    albuterol sulfate  (90 Base) MCG/ACT inhaler, Inhale 2 puffs into the lungs every 6 hours as needed for Wheezing, Disp: , Rfl:       SOCIAL HISTORY     Social History     Social History Narrative    Not on file     Social History     Tobacco Use    Smoking status: Former Smoker     Packs/day: 0.00     Years: 9.00     Pack years: 0.00     Quit date: 2019     Years since quittin.3    Smokeless tobacco: Never Used   Vaping Use    Vaping Use: Never used   Substance Use Topics    Alcohol use: No    Drug use: No         ALLERGIES     Allergies   Allergen Reactions    Rocephin [Ceftriaxone] Anaphylaxis    Toradol [Ketorolac Tromethamine] Hives         FAMILY HISTORY     Family History   Problem Relation Age of Onset    Diabetes Mother     High Blood Pressure Mother     Other Father Sickle Cell Trait    Asthma Sister    Naveed Baig Asthma Brother          PREVIOUS RECORDS   Previous records reviewed: Patient last seen for lumbosacral spine pain in March of this year. Evgeny Evans PHYSICAL EXAM     ED Triage Vitals [05/29/21 2007]   BP Temp Temp Source Pulse Resp SpO2 Height Weight   (!) 149/76 98.7 °F (37.1 °C) Oral 74 16 100 % 5' 9\" (1.753 m) 260 lb (117.9 kg)     Initial vital signs and nursing assessment reviewed and normal. Body mass index is 38.4 kg/m². Pulsoximetry is normal per my interpretation. Additional Vital Signs:  Vitals:    05/29/21 2007   BP: (!) 149/76   Pulse: 74   Resp: 16   Temp: 98.7 °F (37.1 °C)   SpO2: 100%       Physical Exam  Constitutional:       General: She is not in acute distress. Appearance: She is not toxic-appearing or diaphoretic. HENT:      Head: Normocephalic and atraumatic. Ears:      Comments: Bilateral middle ear effusion. No purulence or erythema. Noninfectious. Mouth/Throat:      Mouth: Mucous membranes are moist.      Pharynx: Oropharynx is clear. No oropharyngeal exudate or posterior oropharyngeal erythema. Eyes:      General: No scleral icterus. Right eye: No discharge. Left eye: No discharge. Extraocular Movements: Extraocular movements intact. Conjunctiva/sclera: Conjunctivae normal.   Cardiovascular:      Rate and Rhythm: Normal rate and regular rhythm. Pulses: Normal pulses. Heart sounds: No murmur heard. No friction rub. No gallop. Pulmonary:      Effort: Pulmonary effort is normal.      Breath sounds: Wheezing (End expiratory left greater than right.) present. No rhonchi or rales. Abdominal:      Palpations: Abdomen is soft. Tenderness: There is no abdominal tenderness. There is no guarding or rebound. Musculoskeletal:      Cervical back: Normal range of motion. No rigidity. Skin:     General: Skin is warm and dry. Capillary Refill: Capillary refill takes less than 2 seconds. Neurological:      General: No focal deficit present. Mental Status: She is alert and oriented to person, place, and time. MEDICAL DECISION MAKING   Initial Assessment:   1. Pleasant 65-year-old female in no acute distress. Worsening chest tightness consistent with her prior asthma exacerbations. End expiratory wheezes left lung greater than right. Bilateral middle ear effusion without purulence. Plan:    DuoNeb   Cetirizine and Decadron   Chest x-ray   Expected disposition is discharge home after improvement of symptoms and follow-up with primary care physician this week. Strict return precautions and include difficulty breathing and shortness of breath that is worsening. Patient agreeable to plan and discharge. ED RESULTS   Laboratory results:  Labs Reviewed - No data to display    Radiologic studies results:  XR CHEST (2 VW)   Final Result   Stable radiographic appearance of the chest. No evidence of an acute process. **This report has been created using voice recognition software. It may contain minor errors which are inherent in voice recognition technology. **      Final report electronically signed by Dr. Manpreet Lin on 5/29/2021 8:33 PM          ED Medications administered this visit:   Medications   ipratropium-albuterol (DUONEB) nebulizer solution 1 ampule (1 ampule Inhalation Given 5/29/21 2053)   cetirizine (ZYRTEC) tablet 10 mg (10 mg Oral Given 5/29/21 2102)   dexamethasone (DECADRON) tablet 6 mg (6 mg Oral Given 5/29/21 2102)         ED COURSE          Strict return precautions and follow up instructions were discussed with the patient prior to discharge, with which the patient agrees.       MEDICATION CHANGES     New Prescriptions    CETIRIZINE (ZYRTEC) 10 MG TABLET    Take 1 tablet by mouth daily         FINAL DISPOSITION     Final diagnoses:   Non-recurrent acute serous otitis media of both ears   Asthma with acute exacerbation, unspecified asthma severity, unspecified whether persistent     Condition: condition: stable  Dispo: Discharge to home      This transcription was electronically signed. Parts of this transcriptions may have been dictated by use of voice recognition software and electronically transcribed, and parts may have been transcribed with the assistance of an ED scribe. The transcription may contain errors not detected in proofreading. Please refer to my supervising physician's documentation if my documentation differs.     Electronically Signed: Ramona Bautista DO, 05/29/21, 9:16 PM       Ramona Bautista DO  Resident  05/29/21 3742

## 2021-06-18 ENCOUNTER — HOSPITAL ENCOUNTER (EMERGENCY)
Age: 31
Discharge: HOME | End: 2021-06-18
Attending: FAMILY MEDICINE
Payer: MEDICARE

## 2021-06-18 VITALS
TEMPERATURE: 98.8 F | SYSTOLIC BLOOD PRESSURE: 135 MMHG | OXYGEN SATURATION: 96 % | HEIGHT: 69 IN | HEART RATE: 75 BPM | BODY MASS INDEX: 38.51 KG/M2 | RESPIRATION RATE: 16 BRPM | WEIGHT: 260 LBS | DIASTOLIC BLOOD PRESSURE: 75 MMHG

## 2021-06-18 DIAGNOSIS — H65.00 ACUTE SEROUS OTITIS MEDIA, RECURRENCE NOT SPECIFIED, UNSPECIFIED LATERALITY: Primary | ICD-10-CM

## 2021-06-18 LAB
FLU A ANTIGEN: NEGATIVE
FLU B ANTIGEN: NEGATIVE
SARS-COV-2, NAAT: NOT DETECTED

## 2021-06-18 PROCEDURE — 87804 INFLUENZA ASSAY W/OPTIC: CPT

## 2021-06-18 PROCEDURE — 99281 EMR DPT VST MAYX REQ PHY/QHP: CPT

## 2021-06-18 PROCEDURE — 87635 SARS-COV-2 COVID-19 AMP PRB: CPT

## 2021-06-18 RX ORDER — PREDNISONE 20 MG/1
20 TABLET ORAL 2 TIMES DAILY
Qty: 10 TABLET | Refills: 0 | Status: SHIPPED | OUTPATIENT
Start: 2021-06-18 | End: 2021-06-23

## 2021-06-18 RX ORDER — AMOXICILLIN AND CLAVULANATE POTASSIUM 875; 125 MG/1; MG/1
1 TABLET, FILM COATED ORAL 2 TIMES DAILY
Qty: 20 TABLET | Refills: 0 | Status: SHIPPED | OUTPATIENT
Start: 2021-06-18 | End: 2021-06-28

## 2021-06-18 ASSESSMENT — ENCOUNTER SYMPTOMS
NAUSEA: 0
COUGH: 0
ANAL BLEEDING: 0
SHORTNESS OF BREATH: 0
EYE ITCHING: 0
SINUS PAIN: 1
CHOKING: 0
EYE DISCHARGE: 0
EYE REDNESS: 0
RHINORRHEA: 1
BLOOD IN STOOL: 0

## 2021-06-19 NOTE — ED TRIAGE NOTES
Patient to ED from home with children presenting as congestion. Patient denies pain. Pt does not appear to be in any acute distress.

## 2021-06-19 NOTE — ED PROVIDER NOTES
[facility] [address] [address] EMERGENCY DEPT  EMERGENCY DEPARTMENT ENCOUNTER      Pt Name: Ramsey Pandey  MRN: [id number]  [place] 1990  Date of evaluation: 6/18/2021  Provider: Kaveh Veronica MD    CHIEF COMPLAINT     No chief complaint on file. cough and congestion      HISTORY OF PRESENT ILLNESS   (Location/Symptom, Timing/Onset, Context/Setting, Quality, Duration, Modifying Factors, Severity)  Note limiting factors. Ramsey Pandey is a 31 y.o. female who presents to the emergency department complaining of left ear pain and general congestion and wheezing for about 4 weeks. Patient was Covid tested earlier today was negative. The patient notes that she is had cold cough congestion fevers chills and just general malaise and fatigue the seems of gotten better and worse she did see her primary care doctor 2 weeks ago was placed on amoxicillin and now seems like she is getting worse after that. Patient notes that she is initially better and now worse from initial treatment, she denies any chest pain shortness of breath no leg swelling she denies any ill exposures but she does have 3 daughters in the house and all 3 of them are also ill and with her tonight. Patient is allergic to penicillin has asthma and no other significant medical problems, denies current pregnancy. HPI    Nursing Notes were reviewed. REVIEW OF SYSTEMS    (2-9 systems for level 4, 10 or more for level 5)     Review of Systems   Constitutional: Positive for fatigue. Negative for activity change and diaphoresis. HENT: Positive for congestion, ear pain, rhinorrhea and sinus pain. Negative for hearing loss, mouth sores and postnasal drip. Eyes: Negative for discharge, redness and itching. Respiratory: Negative for cough, choking and shortness of breath. Cardiovascular: Negative for chest pain, palpitations and leg swelling. Gastrointestinal: Negative for anal bleeding, blood in stool and nausea.    Endocrine: Negative for cold intolerance, heat intolerance and polyphagia. Genitourinary: Negative for frequency, genital sores and hematuria. Musculoskeletal: Negative for gait problem, joint swelling and myalgias. Allergic/Immunologic: Negative for environmental allergies and food allergies. Neurological: Negative for tremors, seizures and headaches. Hematological: Negative for adenopathy. Does not bruise/bleed easily. Psychiatric/Behavioral: Negative for behavioral problems, confusion and decreased concentration. Except as noted above the remainder of the review of systems was reviewed and negative.        PAST MEDICAL HISTORY     Past Medical History:   Diagnosis Date    Asthma     Nicotine dependence          SURGICAL HISTORY       Past Surgical History:   Procedure Laterality Date     SECTION      x3    THYROGLOSSAL DUCT EXCISION N/A 2019    EXCISION OF THYROGLOSSAL DUCT OR SINUS performed by Ramakrishna Buchanan MD at [facility]       Previous Medications    ALBUTEROL (ACCUNEB) 0.63 MG/3ML NEBULIZER SOLUTION    Take 3 mLs by nebulization every 6 hours as needed for Wheezing    ALBUTEROL SULFATE  (90 BASE) MCG/ACT INHALER    Inhale 2 puffs into the lungs every 6 hours as needed for Wheezing    AMOXICILLIN (AMOXIL) 250 MG CAPSULE    Take 250 mg by mouth 3 times daily    ATROVENT HFA 17 MCG/ACT INHALER    2 puffs as needed     CETIRIZINE (ZYRTEC) 10 MG TABLET    Take 1 tablet by mouth daily    FLUTICASONE (FLONASE) 50 MCG/ACT NASAL SPRAY    1 spray by Each Nostril route daily    IBUPROFEN (ADVIL;MOTRIN) 800 MG TABLET    Take 1 tablet by mouth every 8 hours as needed for Pain    NAPROXEN (NAPROSYN) 500 MG TABLET    Take 1 tablet by mouth 2 times daily    RESPIRATORY THERAPY SUPPLIES (PEDIATRIC COMPRESSOR/NEBULIZER) KIT    1 each by Does not apply route once for 1 dose       ALLERGIES     Rocephin [ceftriaxone] and Toradol [ketorolac tromethamine]    FAMILY HISTORY       Family History   Problem Relation Age of Onset    Diabetes Mother     High Blood Pressure Mother     Other Father         Sickle Cell Trait    Asthma Sister     Asthma Brother           SOCIAL HISTORY       Social History     Socioeconomic History    Marital status: Single     Spouse name: Not on file    Number of children: Not on file    Years of education: Not on file    Highest education level: Not on file   Occupational History    Not on file   Tobacco Use    Smoking status: Former Smoker     Packs/day: 0.00     Years: 9.00     Pack years: 0.00     Quit date: 2019     Years since quittin.3    Smokeless tobacco: Never Used   Vaping Use    Vaping Use: Never used   Substance and Sexual Activity    Alcohol use: No    Drug use: No    Sexual activity: Yes     Partners: Male   Other Topics Concern    Not on file   Social History Narrative    Not on file     Social Determinants of Health     Financial Resource Strain:     Difficulty of Paying Living Expenses:    Food Insecurity:     Worried About Running Out of Food in the Last Year:     [address] in the Last Year:    Transportation Needs:     Lack of Transportation (Medical):      Lack of Transportation (Non-Medical):    Physical Activity:     Days of Exercise per Week:     Minutes of Exercise per Session:    Stress:     Feeling of Stress :    Social Connections:     Frequency of Communication with Friends and Family:     Frequency of Social Gatherings with Friends and Family:     Attends [employer] Services:     Active Member of Clubs or Organizations:     Attends Club or Organization Meetings:     Marital Status:    Intimate Partner Violence:     Fear of Current or Ex-Partner:     Emotionally Abused:     Physically Abused:     Sexually Abused:        SCREENINGS                        PHYSICAL EXAM    (up to 7 for level 4, 8 or more for level 5)     ED Triage Vitals [21 2137]   BP Temp Temp Source Pulse Resp SpO2 Height DEPARTMENT COURSE and DIFFERENTIAL DIAGNOSIS/MDM:   Vitals:    Vitals:    06/18/21 2137   BP: 135/75   Pulse: 75   Resp: 16   Temp: 98.8 °F (37.1 °C)   TempSrc: Oral   SpO2: 96%   Weight: 260 lb (117.9 kg)   Height: 5' 9\" (1.753 m)           MDM      REASSESSMENT      Patient is generally stable, breathing well, talking in full paragraphs, while I can clearly hear wheezing during examination at some she is in no acute distress and able to eat drink and take fluids and food normally. CRITICAL CARE TIME   Total Critical Care time was 0 minutes, excluding separately reportable procedures. There was a high probability of clinically significant/life threatening deterioration in the patient's condition which required my urgent intervention. CONSULTS:  None    PROCEDURES:  Unless otherwise noted below, none     Procedures        FINAL IMPRESSION      1. Acute serous otitis media, recurrence not specified, unspecified laterality          DISPOSITION/PLAN   DISPOSITION Decision To Discharge 06/18/2021 10:03:19 PM      PATIENT REFERRED TO:  Wilver Gallardo, ABHAY - CNP  [address] [address]  [phone]    Schedule an appointment as soon as possible for a visit in 1 week  With primary care doctor to follow up from [address] [place] [address]:  New Prescriptions    AMOXICILLIN-CLAVULANATE (AUGMENTIN) 875-125 MG PER TABLET    Take 1 tablet by mouth 2 times daily for 10 days    PREDNISONE (DELTASONE) 20 MG TABLET    Take 1 tablet by mouth 2 times daily for 5 days     Controlled Substances Monitoring:     No flowsheet data found.     (Please note that portions of this note were completed with a voice recognition program.  Efforts were made to edit the dictations but occasionally words are mis-transcribed.)    Ayanna Fernandez MD (electronically signed)  Attending Emergency Physician          Maynor Medina MD  06/18/21 9661

## 2021-07-11 ENCOUNTER — HOSPITAL ENCOUNTER (EMERGENCY)
Age: 31
Discharge: HOME | End: 2021-07-11
Payer: MEDICARE

## 2021-07-11 VITALS
DIASTOLIC BLOOD PRESSURE: 76 MMHG | RESPIRATION RATE: 18 BRPM | SYSTOLIC BLOOD PRESSURE: 146 MMHG | TEMPERATURE: 97.7 F | OXYGEN SATURATION: 98 % | HEART RATE: 93 BPM

## 2021-07-11 DIAGNOSIS — J06.9 VIRAL URI: Primary | ICD-10-CM

## 2021-07-11 LAB
BILIRUBIN URINE: NEGATIVE
BLOOD, URINE: NEGATIVE
CHARACTER, URINE: CLEAR
COLOR: YELLOW
GLUCOSE URINE: NEGATIVE MG/DL
KETONES, URINE: ABNORMAL
LEUKOCYTE ESTERASE, URINE: NEGATIVE
NITRITE, URINE: NEGATIVE
PH UA: 6.5 (ref 5–9)
PREGNANCY, URINE: NEGATIVE
PROTEIN UA: NEGATIVE
SARS-COV-2, NAAT: NOT DETECTED
SPECIFIC GRAVITY, URINE: 1.02 (ref 1–1.03)
UROBILINOGEN, URINE: 1 EU/DL (ref 0–1)

## 2021-07-11 PROCEDURE — 99282 EMERGENCY DEPT VISIT SF MDM: CPT

## 2021-07-11 PROCEDURE — 81025 URINE PREGNANCY TEST: CPT

## 2021-07-11 PROCEDURE — 81003 URINALYSIS AUTO W/O SCOPE: CPT

## 2021-07-11 PROCEDURE — 87635 SARS-COV-2 COVID-19 AMP PRB: CPT

## 2021-07-11 RX ORDER — LORATADINE 10 MG/1
10 TABLET ORAL DAILY
Qty: 30 TABLET | Refills: 0 | Status: SHIPPED | OUTPATIENT
Start: 2021-07-11 | End: 2021-10-18

## 2021-07-11 ASSESSMENT — ENCOUNTER SYMPTOMS
BACK PAIN: 0
COUGH: 1
SORE THROAT: 0
NAUSEA: 0
DIARRHEA: 0
COLOR CHANGE: 0
EYE DISCHARGE: 0
ABDOMINAL PAIN: 0
WHEEZING: 0
EYE ITCHING: 0
RHINORRHEA: 0
SHORTNESS OF BREATH: 0
VOMITING: 0
EYE PAIN: 0

## 2021-07-11 NOTE — ED PROVIDER NOTES
[facility] [address] COMPLAINT       Chief Complaint   Patient presents with    Nasal Congestion       Nurses Notes reviewed and I agree except as notedin the HPI. HISTORY OF PRESENT ILLNESS    Jerri Christensen is a 31 y.o. female who presents as a nasal congestion and cough for last 3 days. She states she is had intermittent left lower abdominal pain. She is not any fever. No urinary symptoms. She is here with her 3 of the children who have similar symptoms. She has no other complaints. Location/Symptom: congestion  Timing/Onset: 3 days  Context/Setting: home  Quality: none  Duration: off and on  Modifying Factors: none  Severity: none    REVIEW OF SYSTEMS     Review of Systems   Constitutional: Negative for activity change, appetite change, chills and fever. HENT: Positive for congestion. Negative for ear pain, rhinorrhea and sore throat. Eyes: Negative for pain, discharge and itching. Respiratory: Positive for cough. Negative for shortness of breath and wheezing. Cardiovascular: Negative for chest pain. Gastrointestinal: Negative for abdominal pain, diarrhea, nausea and vomiting. Genitourinary: Negative for difficulty urinating and dysuria. Musculoskeletal: Negative for arthralgias, back pain and myalgias. Skin: Negative for color change and rash. Neurological: Negative for dizziness, seizures, light-headedness and headaches. Psychiatric/Behavioral: Negative for agitation, confusion, self-injury and suicidal ideas. All other systems reviewed and are negative. PAST MEDICAL HISTORY    has a past medical history of Asthma and Nicotine dependence. SURGICAL HISTORY      has a past surgical history that includes  section and thyroglossal duct excision (N/A, 2019).     CURRENT MEDICATIONS       Discharge Medication List as of 2021  6:04 PM      CONTINUE these medications which have NOT CHANGED    Details amoxicillin (AMOXIL) 250 MG capsule Take 250 mg by mouth 3 times dailyHistorical Med      naproxen (NAPROSYN) 500 MG tablet Take 1 tablet by mouth 2 times daily, Disp-14 tablet, R-0Normal      albuterol (ACCUNEB) 0.63 MG/3ML nebulizer solution Take 3 mLs by nebulization every 6 hours as needed for Wheezing, Disp-270 mL,R-0Print      Respiratory Therapy Supplies (PEDIATRIC COMPRESSOR/NEBULIZER) KIT 1 each by Does not apply route once for 1 dose, Disp-1 kit,R-0Print      fluticasone (FLONASE) 50 MCG/ACT nasal spray 1 spray by Each Nostril route daily, Disp-1 Bottle, R-0Print      ibuprofen (ADVIL;MOTRIN) 800 MG tablet Take 1 tablet by mouth every 8 hours as needed for Pain, Disp-30 tablet, R-0Print      ATROVENT HFA 17 MCG/ACT inhaler 2 puffs as needed , R-1, DAWHistorical Med      albuterol sulfate  (90 Base) MCG/ACT inhaler Inhale 2 puffs into the lungs every 6 hours as needed for WheezingHistorical Med             ALLERGIES     is allergic to rocephin [ceftriaxone] and toradol [ketorolac tromethamine]. HISTORY     She indicated that her mother is alive. She indicated that her father is alive. She indicated that both of her sisters are alive. She indicated that both of her brothers are alive. family history includes Asthma in her brother and sister; Diabetes in her mother; High Blood Pressure in her mother; Other in her father. SOCIALHISTORY      reports that she quit smoking about 2 years ago. She smoked 0.00 packs per day for 9.00 years. She has never used smokeless tobacco. She reports that she does not drink alcohol and does not use drugs. PHYSICAL EXAM     INITIAL VITALS:  oral temperature is 97.7 °F (36.5 °C). Her blood pressure is 146/76 (abnormal) and her pulse is 93. Her respiration is 18 and oxygen saturation is 98%. Physical Exam  Vitals and nursing note reviewed. Constitutional:       Comments:  Well Developed Well Nourished Appearing     HENT:      Head: Normocephalic and atraumatic. Right Ear: Tympanic membrane normal.      Left Ear: Tympanic membrane normal.      Nose: Nose normal.      Mouth/Throat:      Mouth: Mucous membranes are moist.   Eyes:      Pupils: Pupils are equal, round, and reactive to light. Cardiovascular:      Rate and Rhythm: Normal rate and regular rhythm. Heart sounds: Normal heart sounds. Pulmonary:      Effort: Pulmonary effort is normal. No respiratory distress. Breath sounds: Normal breath sounds. No wheezing. Abdominal:      General: Bowel sounds are normal. There is no distension. Palpations: Abdomen is soft. Comments: Abdomen soft nontender   Musculoskeletal:      Cervical back: Normal range of motion and neck supple. DIFFERENTIAL DIAGNOSIS:   Viral illness but benign abdominal exam no evidence of diverticulitis ovarian torsion. DIAGNOSTIC RESULTS     EKG: All EKG's are interpreted by the Emergency Department Physician who either signs or Co-signs this chart in the absence of a cardiologist.      RADIOLOGY: non-plain film images(s) such as CT, Ultrasound and MRI are read by the radiologist.  None      LABS:   Labs Reviewed   URINE RT REFLEX TO CULTURE - Abnormal; Notable for the following components:       Result Value    Ketones, Urine TRACE (*)     All other components within normal limits   COVID-19, RAPID   PREGNANCY, URINE       EMERGENCY DEPARTMENT COURSE:   :    Vitals:    07/11/21 1640   BP: (!) 146/76   Pulse: 93   Resp: 18   Temp: 97.7 °F (36.5 °C)   TempSrc: Oral   SpO2: 98%     Patient was seen history physical exam was performed. See disposition below    CRITICAL CARE:  None    CONSULTS:  None    PROCEDURES:  None    FINAL IMPRESSION      1.  Viral URI          DISPOSITION/PLAN   Discharge    PATIENT REFERRED TO:  Raheel Biswas, APRN - SIA  [address] [phone]    In 1 week        DISCHARGE MEDICATIONS:  Discharge Medication List as of 7/11/2021  6:04 PM      START

## 2021-08-13 ENCOUNTER — HOSPITAL ENCOUNTER (EMERGENCY)
Age: 31
Discharge: HOME | End: 2021-08-13
Attending: EMERGENCY MEDICINE
Payer: MEDICARE

## 2021-08-13 VITALS
RESPIRATION RATE: 18 BRPM | BODY MASS INDEX: 53.01 KG/M2 | HEART RATE: 83 BPM | HEIGHT: 60 IN | WEIGHT: 270 LBS | TEMPERATURE: 98.1 F | DIASTOLIC BLOOD PRESSURE: 85 MMHG | SYSTOLIC BLOOD PRESSURE: 148 MMHG | OXYGEN SATURATION: 98 %

## 2021-08-13 DIAGNOSIS — J06.9 VIRAL UPPER RESPIRATORY TRACT INFECTION: Primary | ICD-10-CM

## 2021-08-13 DIAGNOSIS — J45.901 EXACERBATION OF ASTHMA, UNSPECIFIED ASTHMA SEVERITY, UNSPECIFIED WHETHER PERSISTENT: ICD-10-CM

## 2021-08-13 LAB — SARS-COV-2, NAAT: NOT DETECTED

## 2021-08-13 PROCEDURE — 87635 SARS-COV-2 COVID-19 AMP PRB: CPT

## 2021-08-13 PROCEDURE — 99282 EMERGENCY DEPT VISIT SF MDM: CPT

## 2021-08-13 RX ORDER — PREDNISONE 10 MG/1
TABLET ORAL
Qty: 16 TABLET | Refills: 0 | Status: SHIPPED | OUTPATIENT
Start: 2021-08-13 | End: 2021-10-18

## 2021-08-13 RX ORDER — AZITHROMYCIN 250 MG/1
TABLET, FILM COATED ORAL
Qty: 1 PACKET | Refills: 0 | Status: SHIPPED | OUTPATIENT
Start: 2021-08-13 | End: 2021-08-17

## 2021-08-13 ASSESSMENT — ENCOUNTER SYMPTOMS
CONSTIPATION: 0
RHINORRHEA: 1
TROUBLE SWALLOWING: 0
COUGH: 1
ABDOMINAL PAIN: 0
BACK PAIN: 0
DIARRHEA: 0
SINUS PRESSURE: 0
SORE THROAT: 0
NAUSEA: 0
VOICE CHANGE: 0
WHEEZING: 1
CHEST TIGHTNESS: 0
VOMITING: 0
SHORTNESS OF BREATH: 1

## 2021-08-13 NOTE — ED PROVIDER NOTES
[address] [facility] COMPLAINT    Chief Complaint   Patient presents with    Cough    Chest Congestion       Nurses Notes reviewed and I agree except as noted in the HPI. HPI    Angel Stover is a 27 y.o. female who presents for evaluation of congestion and coughing for the past 3 days. Patient had known history of asthma and using nebulizer inhaler at home. Patient denies any fever, no chills, no shortness of breath, no chest pain, no nausea, no vomiting no diarrhea, no ageusia no anosmia. Patient coughing out light yellow to clear phlegm. Patient came in here together with this daughters who had the same symptoms      REVIEW OF SYSTEMS    Review of Systems   Constitutional: Negative for appetite change, chills, diaphoresis, fatigue and fever. HENT: Positive for congestion and rhinorrhea. Negative for ear pain, postnasal drip, sinus pressure, sneezing, sore throat, trouble swallowing and voice change. Respiratory: Positive for cough, shortness of breath and wheezing. Negative for chest tightness. Cardiovascular: Negative for chest pain, palpitations and leg swelling. Gastrointestinal: Negative for abdominal pain, constipation, diarrhea, nausea and vomiting. Musculoskeletal: Negative for arthralgias, back pain, joint swelling, myalgias, neck pain and neck stiffness. Neurological: Negative for dizziness, syncope, weakness, light-headedness, numbness and headaches. PAST MEDICAL HISTORY     has a past medical history of Asthma and Nicotine dependence. SURGICAL HISTORY   has a past surgical history that includes  section and thyroglossal duct excision (N/A, 2019).     CURRENT MEDICATIONS    Previous Medications    ALBUTEROL (ACCUNEB) 0.63 MG/3ML NEBULIZER SOLUTION    Take 3 mLs by nebulization every 6 hours as needed for Wheezing    ALBUTEROL SULFATE  (90 BASE) MCG/ACT INHALER Inhale 2 puffs into the lungs every 6 hours as needed for Wheezing    AMOXICILLIN (AMOXIL) 250 MG CAPSULE    Take 250 mg by mouth 3 times daily    ATROVENT HFA 17 MCG/ACT INHALER    2 puffs as needed     FLUTICASONE (FLONASE) 50 MCG/ACT NASAL SPRAY    1 spray by Each Nostril route daily    IBUPROFEN (ADVIL;MOTRIN) 800 MG TABLET    Take 1 tablet by mouth every 8 hours as needed for Pain    LORATADINE (CLARITIN) 10 MG TABLET    Take 1 tablet by mouth daily    NAPROXEN (NAPROSYN) 500 MG TABLET    Take 1 tablet by mouth 2 times daily    RESPIRATORY THERAPY SUPPLIES (PEDIATRIC COMPRESSOR/NEBULIZER) KIT    1 each by Does not apply route once for 1 dose       ALLERGIES    is allergic to rocephin [ceftriaxone] and toradol [ketorolac tromethamine]. FAMILY HISTORY    She indicated that her mother is alive. She indicated that her father is alive. She indicated that both of her sisters are alive. She indicated that both of her brothers are alive. family history includes Asthma in her brother and sister; Diabetes in her mother; High Blood Pressure in her mother; Other in her father. SOCIAL HISTORY     reports that she quit smoking about 2 years ago. She smoked 0.00 packs per day for 9.00 years. She has never used smokeless tobacco. She reports that she does not drink alcohol and does not use drugs. PHYSICAL EXAM      INITIAL VITALS: BP (!) 148/85   Pulse 83   Temp 98.1 °F (36.7 °C) (Oral)   Resp 18   Ht 5' (1.524 m)   Wt 270 lb (122.5 kg)   SpO2 98%   BMI 52.73 kg/m² Estimated body mass index is 52.73 kg/m² as calculated from the following:    Height as of this encounter: 5' (1.524 m). Weight as of this encounter: 270 lb (122.5 kg). Physical Exam  Vitals reviewed. Constitutional:       Appearance: She is well-developed. HENT:      Head: Normocephalic and atraumatic.       Right Ear: External ear normal.      Left Ear: External ear normal.      Nose: Nose normal.   Eyes:      General: No scleral icterus. Conjunctiva/sclera: Conjunctivae normal.      Pupils: Pupils are equal, round, and reactive to light. Neck:      Thyroid: No thyromegaly. Vascular: No JVD. Cardiovascular:      Rate and Rhythm: Normal rate and regular rhythm. Heart sounds: No murmur heard. No friction rub. Pulmonary:      Effort: Pulmonary effort is normal.      Breath sounds: Wheezing and rhonchi present. No rales. Chest:      Chest wall: No tenderness. Abdominal:      General: Bowel sounds are normal.      Palpations: Abdomen is soft. There is no mass. Tenderness: There is no abdominal tenderness. Musculoskeletal:      Cervical back: Normal range of motion and neck supple. Lymphadenopathy:      Cervical: No cervical adenopathy. Skin:     Findings: No rash. Neurological:      Mental Status: She is alert and oriented to person, place, and time. Psychiatric:         Behavior: Behavior is cooperative. MEDICAL DECISION MAKING    DIFFERENTIAL DIAGNOSIS:  URI, bronchitis, Covid, asthma exacerbation      DIAGNOSTIC RESULTS      LABS:   Labs Reviewed   COVID-19, RAPID     All other unresulted laboratory test above are normal:    Vitals:    Vitals:    08/13/21 1109   BP: (!) 148/85   Pulse: 83   Resp: 18   Temp: 98.1 °F (36.7 °C)   TempSrc: Oral   SpO2: 98%   Weight: 270 lb (122.5 kg)   Height: 5' (1.524 m)       EMERGENCY DEPARTMENT COURSE:    Medications - No data to display    The pt was seen and evaluated by me. Within the department, I observed the pt's vitalsigns to be within acceptable range. Laboratory studies were performed, results were reviewed with the patient. I observed the pt's condition to be hemodynamically stable during the duration of their stay. I explained my proposed course of treatment to the pt, and they were amenable to my decision. They were discharged home, and they will return to the ED if their symptoms become more severein nature, or otherwise change.      Controlled

## 2021-09-30 ENCOUNTER — HOSPITAL ENCOUNTER (EMERGENCY)
Age: 31
Discharge: HOME | End: 2021-09-30
Attending: EMERGENCY MEDICINE
Payer: MEDICARE

## 2021-09-30 VITALS
WEIGHT: 270 LBS | DIASTOLIC BLOOD PRESSURE: 83 MMHG | RESPIRATION RATE: 19 BRPM | TEMPERATURE: 98.2 F | SYSTOLIC BLOOD PRESSURE: 158 MMHG | HEART RATE: 85 BPM | OXYGEN SATURATION: 99 % | BODY MASS INDEX: 52.73 KG/M2

## 2021-09-30 DIAGNOSIS — R10.2 PELVIC PAIN: Primary | ICD-10-CM

## 2021-09-30 LAB
ALBUMIN SERPL-MCNC: 4.1 G/DL (ref 3.5–5.1)
ALP BLD-CCNC: 68 U/L (ref 38–126)
ALT SERPL-CCNC: 14 U/L (ref 11–66)
ANION GAP SERPL CALCULATED.3IONS-SCNC: 8 MEQ/L (ref 8–16)
AST SERPL-CCNC: 15 U/L (ref 5–40)
BASOPHILS # BLD: 0.3 %
BASOPHILS ABSOLUTE: 0 THOU/MM3 (ref 0–0.1)
BILIRUB SERPL-MCNC: 0.4 MG/DL (ref 0.3–1.2)
BILIRUBIN URINE: NEGATIVE
BLOOD, URINE: NEGATIVE
BUN BLDV-MCNC: 9 MG/DL (ref 7–22)
CALCIUM SERPL-MCNC: 8.3 MG/DL (ref 8.5–10.5)
CHARACTER, URINE: CLEAR
CHLAMYDIA TRACHOMATIS BY RT-PCR: NOT DETECTED
CHLORIDE BLD-SCNC: 106 MEQ/L (ref 98–111)
CO2: 27 MEQ/L (ref 23–33)
COLOR: YELLOW
CREAT SERPL-MCNC: 0.7 MG/DL (ref 0.4–1.2)
CT/NG SOURCE: NORMAL
EOSINOPHIL # BLD: 3.8 %
EOSINOPHILS ABSOLUTE: 0.3 THOU/MM3 (ref 0–0.4)
ERYTHROCYTE [DISTWIDTH] IN BLOOD BY AUTOMATED COUNT: 13.6 % (ref 11.5–14.5)
ERYTHROCYTE [DISTWIDTH] IN BLOOD BY AUTOMATED COUNT: 42.9 FL (ref 35–45)
GFR SERPL CREATININE-BSD FRML MDRD: > 90 ML/MIN/1.73M2
GLUCOSE BLD-MCNC: 121 MG/DL (ref 70–108)
GLUCOSE URINE: NEGATIVE MG/DL
HCT VFR BLD CALC: 39.2 % (ref 37–47)
HEMOGLOBIN: 12.8 GM/DL (ref 12–16)
IMMATURE GRANS (ABS): 0.02 THOU/MM3 (ref 0–0.07)
IMMATURE GRANULOCYTES: 0.3 %
KETONES, URINE: NEGATIVE
LEUKOCYTE ESTERASE, URINE: NEGATIVE
LIPASE: 14.3 U/L (ref 5.6–51.3)
LYMPHOCYTES # BLD: 27.4 %
LYMPHOCYTES ABSOLUTE: 1.9 THOU/MM3 (ref 1–4.8)
MCH RBC QN AUTO: 28.1 PG (ref 26–33)
MCHC RBC AUTO-ENTMCNC: 32.7 GM/DL (ref 32.2–35.5)
MCV RBC AUTO: 86.2 FL (ref 81–99)
MONOCYTES # BLD: 4.8 %
MONOCYTES ABSOLUTE: 0.3 THOU/MM3 (ref 0.4–1.3)
NEISSERIA GONORRHOEAE BY RT-PCR: NOT DETECTED
NITRITE, URINE: NEGATIVE
NUCLEATED RED BLOOD CELLS: 0 /100 WBC
PH UA: 5.5 (ref 5–9)
PLATELET # BLD: 262 THOU/MM3 (ref 130–400)
PMV BLD AUTO: 10.4 FL (ref 9.4–12.4)
POTASSIUM REFLEX MAGNESIUM: 3.7 MEQ/L (ref 3.5–5.2)
PREGNANCY, SERUM: NEGATIVE
PROTEIN UA: NEGATIVE
RBC # BLD: 4.55 MILL/MM3 (ref 4.2–5.4)
SEG NEUTROPHILS: 63.4 %
SEGMENTED NEUTROPHILS ABSOLUTE COUNT: 4.4 THOU/MM3 (ref 1.8–7.7)
SODIUM BLD-SCNC: 141 MEQ/L (ref 135–145)
SPECIFIC GRAVITY, URINE: 1.01 (ref 1–1.03)
TOTAL PROTEIN: 7.3 G/DL (ref 6.1–8)
TRICHOMONAS PREP: NORMAL
UROBILINOGEN, URINE: 0.2 EU/DL (ref 0–1)
WBC # BLD: 7 THOU/MM3 (ref 4.8–10.8)

## 2021-09-30 PROCEDURE — 83690 ASSAY OF LIPASE: CPT

## 2021-09-30 PROCEDURE — 87210 SMEAR WET MOUNT SALINE/INK: CPT

## 2021-09-30 PROCEDURE — 85025 COMPLETE CBC W/AUTO DIFF WBC: CPT

## 2021-09-30 PROCEDURE — 87591 N.GONORRHOEAE DNA AMP PROB: CPT

## 2021-09-30 PROCEDURE — 87491 CHLMYD TRACH DNA AMP PROBE: CPT

## 2021-09-30 PROCEDURE — 81003 URINALYSIS AUTO W/O SCOPE: CPT

## 2021-09-30 PROCEDURE — 36415 COLL VENOUS BLD VENIPUNCTURE: CPT

## 2021-09-30 PROCEDURE — 84703 CHORIONIC GONADOTROPIN ASSAY: CPT

## 2021-09-30 PROCEDURE — 6370000000 HC RX 637 (ALT 250 FOR IP): Performed by: EMERGENCY MEDICINE

## 2021-09-30 PROCEDURE — 99282 EMERGENCY DEPT VISIT SF MDM: CPT

## 2021-09-30 PROCEDURE — 80053 COMPREHEN METABOLIC PANEL: CPT

## 2021-09-30 RX ORDER — ACETAMINOPHEN 500 MG
1000 TABLET ORAL ONCE
Status: COMPLETED | OUTPATIENT
Start: 2021-09-30 | End: 2021-09-30

## 2021-09-30 RX ADMIN — ACETAMINOPHEN 1000 MG: 500 TABLET ORAL at 13:16

## 2021-09-30 ASSESSMENT — PAIN DESCRIPTION - DESCRIPTORS: DESCRIPTORS: RADIATING;CRAMPING

## 2021-09-30 ASSESSMENT — PAIN SCALES - GENERAL
PAINLEVEL_OUTOF10: 6
PAINLEVEL_OUTOF10: 6

## 2021-09-30 ASSESSMENT — PAIN DESCRIPTION - PAIN TYPE: TYPE: ACUTE PAIN

## 2021-09-30 ASSESSMENT — PAIN DESCRIPTION - FREQUENCY: FREQUENCY: INTERMITTENT

## 2021-09-30 ASSESSMENT — ENCOUNTER SYMPTOMS
COLOR CHANGE: 0
SHORTNESS OF BREATH: 0
COUGH: 0
ABDOMINAL PAIN: 1
VOMITING: 0

## 2021-09-30 ASSESSMENT — PAIN DESCRIPTION - ONSET: ONSET: ON-GOING

## 2021-09-30 ASSESSMENT — PAIN DESCRIPTION - PROGRESSION: CLINICAL_PROGRESSION: NOT CHANGED

## 2021-09-30 ASSESSMENT — PAIN DESCRIPTION - LOCATION: LOCATION: BACK

## 2021-09-30 NOTE — ED PROVIDER NOTES
[facility] [address] [address] EMERGENCY DEPT    EMERGENCY MEDICINE     Pt Name: Eliza Brown  MRN: [id number]  [place] 1990  Date of evaluation: 2021  Provider: Graceann Opitz, MD  [facility]       Chief Complaint   Patient presents with    Abdominal Pain       HISTORY OF PRESENT ILLNESS    Eliza Brown is a pleasant 27 y.o. female who presents to the emergency department from home complaining of suprapubic and pelvic cramping. She also c/o hot flashes. She reports there is a possiblity of pregnancy as she is a few days late on her usual period, but her home preg test was negatve. She denies any fevers or n/v/d, denies any uti symptoms, denies any vaginal bleeding/discharge. No other complaints, no other known exacerbating/relieving factors. Triage notes and Nursing notes were reviewed by myself. Any discrepancies are addressed above.     PAST MEDICAL HISTORY     Past Medical History:   Diagnosis Date    Asthma     Nicotine dependence        SURGICAL HISTORY       Past Surgical History:   Procedure Laterality Date     SECTION      x3    THYROGLOSSAL DUCT EXCISION N/A 2019    EXCISION OF THYROGLOSSAL DUCT OR SINUS performed by Luzmaria Kaplan MD at [address] [place] [address]       Current Discharge Medication List      CONTINUE these medications which have NOT CHANGED    Details   predniSONE (DELTASONE) 10 MG tablet 2 tablets 2 times a day for 2 days then 1  Tablet 2 times a day for 2 days, then 1 tablet daily till gone  Qty: 16 tablet, Refills: 0      loratadine (CLARITIN) 10 MG tablet Take 1 tablet by mouth daily  Qty: 30 tablet, Refills: 0      amoxicillin (AMOXIL) 250 MG capsule Take 250 mg by mouth 3 times daily      naproxen (NAPROSYN) 500 MG tablet Take 1 tablet by mouth 2 times daily  Qty: 14 tablet, Refills: 0      albuterol (ACCUNEB) 0.63 MG/3ML nebulizer solution Take 3 mLs by nebulization every 6 hours as needed for Wheezing  Qty: 270 mL, Refills: 0 Respiratory Therapy Supplies (PEDIATRIC COMPRESSOR/NEBULIZER) KIT 1 each by Does not apply route once for 1 dose  Qty: 1 kit, Refills: 0      fluticasone (FLONASE) 50 MCG/ACT nasal spray 1 spray by Each Nostril route daily  Qty: 1 Bottle, Refills: 0      ibuprofen (ADVIL;MOTRIN) 800 MG tablet Take 1 tablet by mouth every 8 hours as needed for Pain  Qty: 30 tablet, Refills: 0      ATROVENT HFA 17 MCG/ACT inhaler 2 puffs as needed   Refills: 1      albuterol sulfate  (90 Base) MCG/ACT inhaler Inhale 2 puffs into the lungs every 6 hours as needed for Wheezing             ALLERGIES     Rocephin [ceftriaxone] and Toradol [ketorolac tromethamine]    FAMILY HISTORY       Family History   Problem Relation Age of Onset    Diabetes Mother     High Blood Pressure Mother     Other Father         Sickle Cell Trait    Asthma Sister     Asthma Brother         SOCIAL HISTORY       Social History     Socioeconomic History    Marital status: Single     Spouse name: None    Number of children: None    Years of education: None    Highest education level: None   Occupational History    None   Tobacco Use    Smoking status: Former Smoker     Packs/day: 0.00     Years: 9.00     Pack years: 0.00     Quit date: 2019     Years since quittin.6    Smokeless tobacco: Never Used   Vaping Use    Vaping Use: Never used   Substance and Sexual Activity    Alcohol use: No    Drug use: No    Sexual activity: Yes     Partners: Male   Other Topics Concern    None   Social History Narrative    None     Social Determinants of Health     Financial Resource Strain:     Difficulty of Paying Living Expenses:    Food Insecurity:     Worried About Running Out of Food in the Last Year:     Ran Out of Food in the Last Year:    Transportation Needs:     Lack of Transportation (Medical):      Lack of Transportation (Non-Medical):    Physical Activity:     Days of Exercise per Week:     Minutes of Exercise per Session: Stress:     Feeling of Stress :    Social Connections:     Frequency of Communication with Friends and Family:     Frequency of Social Gatherings with Friends and Family:     Attends [employer] Services:     Active Member of Clubs or Organizations:     Attends Club or Organization Meetings:     Marital Status:    Intimate Partner Violence:     Fear of Current or Ex-Partner:     Emotionally Abused:     Physically Abused:     Sexually Abused:        REVIEW OF SYSTEMS     Review of Systems   Constitutional: Negative for chills and fever. Respiratory: Negative for cough and shortness of breath. Cardiovascular: Negative for chest pain and leg swelling. Gastrointestinal: Positive for abdominal pain. Negative for vomiting. Skin: Negative for color change and rash. All other systems reviewed and are negative. Except as noted above the remainder of the review of systems was reviewed and is. PHYSICAL EXAM    (up to 7 for level 4, 8 or more for level 5)     ED Triage Vitals [09/30/21 1148]   BP Temp Temp Source Pulse Resp SpO2 Height Weight   (!) 158/83 98.2 °F (36.8 °C) Oral 85 19 99 % -- 270 lb (122.5 kg)       Physical Exam  Vitals and nursing note reviewed. Exam conducted with a chaperone present. HENT:      Head: Normocephalic and atraumatic. Nose: Nose normal.      Mouth/Throat:      Lips: Pink. Mouth: Mucous membranes are moist.   Eyes:      General: Lids are normal.      Conjunctiva/sclera: Conjunctivae normal.   Neck:      Vascular: No JVD. Cardiovascular:      Rate and Rhythm: Normal rate and regular rhythm. Heart sounds: No murmur heard. No friction rub. No gallop. Pulmonary:      Effort: Pulmonary effort is normal.      Breath sounds: Normal breath sounds and air entry. No wheezing, rhonchi or rales. Abdominal:      Palpations: Abdomen is soft. Tenderness: There is no abdominal tenderness.    Genitourinary:     Comments: External inspection normal, no cervicitis, no discharge, no mass, no bleeding  Musculoskeletal:      Cervical back: Neck supple. Lymphadenopathy:      Lower Body: No right inguinal adenopathy. No left inguinal adenopathy. Skin:     General: Skin is warm and dry. Findings: No rash. Neurological:      Mental Status: She is alert. GCS: GCS eye subscore is 4. GCS verbal subscore is 5. GCS motor subscore is 6. Sensory: Sensation is intact. Motor: Motor function is intact. Psychiatric:         Behavior: Behavior is cooperative. DIAGNOSTIC RESULTS     EKG:(none if blank)  All EKG's are interpreted by [facility] Physician who either signs or Co-signs this chart in the absence of a cardiologist.        RADIOLOGY: (none if blank)   Interpretation per the Radiologistbelow, if available at the time of this note:    No orders to display       LABS:  Labs Reviewed   CBC WITH AUTO DIFFERENTIAL - Abnormal; Notable for the following components:       Result Value    Monocytes Absolute 0.3 (*)     All other components within normal limits   COMPREHENSIVE METABOLIC PANEL W/ REFLEX TO MG FOR LOW K - Abnormal; Notable for the following components:    Glucose 121 (*)     Calcium 8.3 (*)     All other components within normal limits   WET PREP, GENITAL    Narrative:     Source: vaginal non-OB patient       Site:           Current Antibiotics: none   C. TRACHOMATIS / N. GONORRHOEAE, DNA   LIPASE   HCG, SERUM, QUALITATIVE   URINE RT REFLEX TO CULTURE   ANION GAP   GLOMERULAR FILTRATION RATE, ESTIMATED       All other labs were within normal range or not returned as of this dictation. Please note, any cultures that may have been sent were not resulted at the time of this patient visit.     EMERGENCY DEPARTMENT COURSE andMedical Decision Making:     MDM/   Pt presents to the ER with pelvic pain, concern for possible pregnancy, abd exam benign, patient denies concern for std, no signs of infection on exam, swab sent, pt agrees to f/u with her pcp/gyn, patient also counseleed regarding ER return rpecautions. Strict returnprecautions and follow up instructions were discussed with the patient with which the patient agrees        ED Medications administered this visit:    Medications   acetaminophen (TYLENOL) tablet 1,000 mg (1,000 mg Oral Given 9/30/21 1316)         Procedures: (None if blank)       CLINICAL       1.  Pelvic pain          DISPOSITION/PLAN   DISPOSITION Decision To Discharge 09/30/2021 02:41:36 PM      PATIENT REFERRED TO:  ABHAY Sargent - CNP  [address] [address]  [phone]    In 3 days        DISCHARGE MEDICATIONS:  Current Discharge Medication List                 (Please note that portions of this note were completed with a voice recognition program.  Efforts were made to edit the dictations but occasionallywords are mis-transcribed.)      Naresh Ricardo MD,FACEP (electronically signed)  Attending Physician, Emergency Department        Erica Haley MD  09/30/21 8444

## 2021-10-18 ENCOUNTER — HOSPITAL ENCOUNTER (EMERGENCY)
Age: 31
Discharge: HOME | End: 2021-10-18
Payer: MEDICARE

## 2021-10-18 VITALS
DIASTOLIC BLOOD PRESSURE: 65 MMHG | SYSTOLIC BLOOD PRESSURE: 115 MMHG | WEIGHT: 260 LBS | RESPIRATION RATE: 16 BRPM | HEART RATE: 90 BPM | BODY MASS INDEX: 50.78 KG/M2 | OXYGEN SATURATION: 98 % | TEMPERATURE: 98.1 F

## 2021-10-18 DIAGNOSIS — J45.20 MILD INTERMITTENT ASTHMA WITHOUT COMPLICATION: Primary | ICD-10-CM

## 2021-10-18 DIAGNOSIS — U07.1 COVID-19 VIRUS INFECTION: ICD-10-CM

## 2021-10-18 PROCEDURE — 99213 OFFICE O/P EST LOW 20 MIN: CPT | Performed by: NURSE PRACTITIONER

## 2021-10-18 PROCEDURE — 99213 OFFICE O/P EST LOW 20 MIN: CPT

## 2021-10-18 RX ORDER — ALBUTEROL SULFATE 90 UG/1
2 AEROSOL, METERED RESPIRATORY (INHALATION) EVERY 4 HOURS PRN
Qty: 18 G | Refills: 0 | Status: SHIPPED | OUTPATIENT
Start: 2021-10-18

## 2021-10-18 RX ORDER — ACETAMINOPHEN 500 MG
1000 TABLET ORAL EVERY 6 HOURS PRN
COMMUNITY

## 2021-10-18 RX ORDER — PREDNISONE 20 MG/1
20 TABLET ORAL 2 TIMES DAILY
Qty: 10 TABLET | Refills: 0 | Status: SHIPPED | OUTPATIENT
Start: 2021-10-18 | End: 2021-10-23

## 2021-10-18 ASSESSMENT — PAIN DESCRIPTION - DESCRIPTORS: DESCRIPTORS: BURNING

## 2021-10-18 ASSESSMENT — ENCOUNTER SYMPTOMS
CHEST TIGHTNESS: 1
EYE REDNESS: 0
DIARRHEA: 0
EYE DISCHARGE: 0
VOMITING: 0
RHINORRHEA: 1
WHEEZING: 1
SHORTNESS OF BREATH: 1
COUGH: 1
TROUBLE SWALLOWING: 0
NAUSEA: 0
SORE THROAT: 1

## 2021-10-18 ASSESSMENT — PAIN SCALES - GENERAL: PAINLEVEL_OUTOF10: 7

## 2021-10-18 ASSESSMENT — PAIN DESCRIPTION - LOCATION: LOCATION: CHEST

## 2021-10-18 ASSESSMENT — PAIN DESCRIPTION - PAIN TYPE: TYPE: ACUTE PAIN

## 2021-10-18 ASSESSMENT — PAIN DESCRIPTION - FREQUENCY: FREQUENCY: INTERMITTENT

## 2021-10-18 NOTE — ED TRIAGE NOTES
Patient states last night had an asthma attack, SOB, with cough, used nebulizer. Chest still feels tight, states coughing makes SOB worse.

## 2021-10-18 NOTE — ED PROVIDER NOTES
[id number]       Chief Complaint   Patient presents with    Asthma     SOB with cough       Nurses Notes reviewed and I agree except as noted in the HPI. HISTORY OF PRESENT ILLNESS   Marleen Souza is a 27 y.o. female who presents with complaints of possible asthma attack. Patient diagnosed with COVID-19 on 10/11/2021. She states that symptoms began on 10/9/2021. No current fever. No chest pain. History of asthma. Patient needs refill for her inhaler. REVIEW OF SYSTEMS     Review of Systems   Constitutional: Negative for chills, diaphoresis, fatigue and fever. HENT: Positive for congestion, postnasal drip, rhinorrhea and sore throat. Negative for ear pain and trouble swallowing. Eyes: Negative for discharge and redness. Respiratory: Positive for cough, chest tightness, shortness of breath and wheezing. Cardiovascular: Negative for chest pain. Gastrointestinal: Negative for diarrhea, nausea and vomiting. Genitourinary: Negative for decreased urine volume. Musculoskeletal: Negative for neck pain and neck stiffness. Skin: Negative for rash. Neurological: Negative for headaches. Hematological: Negative for adenopathy. Psychiatric/Behavioral: Negative for sleep disturbance. PAST MEDICAL HISTORY         Diagnosis Date    Asthma     Nicotine dependence        SURGICAL HISTORY     Patient  has a past surgical history that includes  section and thyroglossal duct excision (N/A, 2019).     CURRENT MEDICATIONS       Discharge Medication List as of 10/18/2021  5:37 PM      CONTINUE these medications which have NOT CHANGED    Details   acetaminophen (TYLENOL) 500 MG tablet Take 1,000 mg by mouth every 6 hours as needed for PainHistorical Med      albuterol (ACCUNEB) 0.63 MG/3ML nebulizer solution Take 3 mLs by nebulization every 6 hours as needed for Wheezing, Disp-270 mL, R-0Print amoxicillin (AMOXIL) 250 MG capsule Take 250 mg by mouth 3 times dailyHistorical Med      Respiratory Therapy Supplies (PEDIATRIC COMPRESSOR/NEBULIZER) KIT 1 each by Does not apply route once for 1 dose, Disp-1 kit,R-0Print      fluticasone (FLONASE) 50 MCG/ACT nasal spray 1 spray by Each Nostril route daily, Disp-1 Bottle, R-0Print      ibuprofen (ADVIL;MOTRIN) 800 MG tablet Take 1 tablet by mouth every 8 hours as needed for Pain, Disp-30 tablet, R-0Print             ALLERGIES     Patient is is allergic to rocephin [ceftriaxone] and toradol [ketorolac tromethamine]. FAMILY HISTORY     Patient'sfamily history includes Asthma in her brother and sister; Diabetes in her mother; High Blood Pressure in her mother; Other in her father. SOCIAL HISTORY     Patient  reports that she quit smoking about 2 years ago. She smoked 0.00 packs per day for 9.00 years. She has never used smokeless tobacco. She reports that she does not drink alcohol and does not use drugs. PHYSICAL EXAM     ED TRIAGE VITALS  BP: 115/65, Temp: 98.1 °F (36.7 °C), Pulse: 90, Resp: 16, SpO2: 98 %  Physical Exam  Vitals and nursing note reviewed. Constitutional:       General: She is not in acute distress. Appearance: Normal appearance. She is well-developed. She is not ill-appearing, toxic-appearing or diaphoretic. HENT:      Head: Normocephalic and atraumatic. Right Ear: Hearing, tympanic membrane, ear canal and external ear normal. No mastoid tenderness. No hemotympanum. Tympanic membrane is not perforated, erythematous or bulging. Left Ear: Hearing, tympanic membrane, ear canal and external ear normal. No mastoid tenderness. No hemotympanum. Tympanic membrane is not perforated, erythematous or bulging. Nose: Nose normal.      Mouth/Throat:      Mouth: Mucous membranes are moist.      Pharynx: Oropharynx is clear. Uvula midline. Tonsils: No tonsillar abscesses. Eyes:      General: No scleral icterus. Conjunctiva/sclera: Conjunctivae normal.      Right eye: Right conjunctiva is not injected. No hemorrhage. Left eye: Left conjunctiva is not injected. No hemorrhage. Neck:      Thyroid: No thyromegaly. Trachea: Trachea normal.   Cardiovascular:      Rate and Rhythm: Normal rate and regular rhythm. No extrasystoles are present. Chest Wall: PMI is not displaced. Heart sounds: Normal heart sounds. No murmur heard. No friction rub. No gallop. Pulmonary:      Effort: Pulmonary effort is normal. No respiratory distress. Breath sounds: Normal breath sounds. Musculoskeletal:      Cervical back: Normal range of motion and neck supple. Lymphadenopathy:      Head:      Right side of head: No submental, submandibular, tonsillar or occipital adenopathy. Left side of head: No submental, submandibular, tonsillar or occipital adenopathy. Cervical: No cervical adenopathy. Upper Body:      Right upper body: No supraclavicular adenopathy. Left upper body: No supraclavicular adenopathy. Skin:     General: Skin is warm and dry. Capillary Refill: Capillary refill takes less than 2 seconds. Coloration: Skin is not pale. Findings: No rash. Comments: Skin warm and dry to touch, no rashes noted on exposed surfaces. Neurological:      Mental Status: She is alert and oriented to person, place, and time. She is not disoriented. Psychiatric:         Mood and Affect: Mood normal.         Behavior: Behavior is cooperative. DIAGNOSTIC RESULTS   Labs: No results found for this visit on 10/18/21. IMAGING:  No orders to display     URGENT CARE COURSE:     Vitals:    10/18/21 1716   BP: 115/65   Pulse: 90   Resp: 16   Temp: 98.1 °F (36.7 °C)   TempSrc: Temporal   SpO2: 98%   Weight: 260 lb (117.9 kg)       Medications - No data to display  PROCEDURES:  None  FINALIMPRESSION      1. Mild intermittent asthma without complication    2.  COVID-19 virus infection DISPOSITION/PLAN   DISPOSITION Decision To Discharge 10/18/2021 05:36:00 PM  Nontoxic, no distress. No adventitious lung sounds. Treat for mild intermittent asthma. Medications as prescribed. If symptoms worsen go to ER. PATIENT REFERRED TO:  ABHAY Spencer CNP  [address] [address]  [phone]      Follow-up as needed. Medications as prescribed. If symptoms worsen go to ER.     DISCHARGE MEDICATIONS:  Discharge Medication List as of 10/18/2021  5:37 PM      START taking these medications    Details   albuterol sulfate HFA (PROVENTIL HFA) 108 (90 Base) MCG/ACT inhaler Inhale 2 puffs into the lungs every 4 hours as needed for Wheezing or Shortness of Breath, Disp-18 g, R-0Normal           Discharge Medication List as of 10/18/2021  5:37 PM      CONTINUE these medications which have CHANGED    Details   predniSONE (DELTASONE) 20 MG tablet Take 1 tablet by mouth 2 times daily for 5 days, Disp-10 tablet, R-0Normal             ABHAY Lopez CNP, APRN - CNP  10/18/21 [id number]

## 2021-10-20 ENCOUNTER — HOSPITAL ENCOUNTER (EMERGENCY)
Age: 31
Discharge: HOME | End: 2021-10-20
Payer: MEDICARE

## 2021-10-20 ENCOUNTER — APPOINTMENT (OUTPATIENT)
Dept: GENERAL RADIOLOGY | Age: 31
End: 2021-10-20
Payer: MEDICARE

## 2021-10-20 VITALS
WEIGHT: 260 LBS | SYSTOLIC BLOOD PRESSURE: 142 MMHG | TEMPERATURE: 98.4 F | BODY MASS INDEX: 50.78 KG/M2 | OXYGEN SATURATION: 98 % | HEART RATE: 96 BPM | RESPIRATION RATE: 18 BRPM | DIASTOLIC BLOOD PRESSURE: 73 MMHG

## 2021-10-20 DIAGNOSIS — R06.02 SHORTNESS OF BREATH: ICD-10-CM

## 2021-10-20 DIAGNOSIS — U07.1 COVID-19: Primary | ICD-10-CM

## 2021-10-20 LAB
ANION GAP SERPL CALCULATED.3IONS-SCNC: 9 MEQ/L (ref 8–16)
BASOPHILS # BLD: 0.2 %
BASOPHILS ABSOLUTE: 0 THOU/MM3 (ref 0–0.1)
BUN BLDV-MCNC: 7 MG/DL (ref 7–22)
CALCIUM SERPL-MCNC: 8.9 MG/DL (ref 8.5–10.5)
CHLORIDE BLD-SCNC: 103 MEQ/L (ref 98–111)
CO2: 29 MEQ/L (ref 23–33)
CREAT SERPL-MCNC: 0.7 MG/DL (ref 0.4–1.2)
EOSINOPHIL # BLD: 0.3 %
EOSINOPHILS ABSOLUTE: 0 THOU/MM3 (ref 0–0.4)
ERYTHROCYTE [DISTWIDTH] IN BLOOD BY AUTOMATED COUNT: 13.4 % (ref 11.5–14.5)
ERYTHROCYTE [DISTWIDTH] IN BLOOD BY AUTOMATED COUNT: 41.5 FL (ref 35–45)
GFR SERPL CREATININE-BSD FRML MDRD: > 90 ML/MIN/1.73M2
GLUCOSE BLD-MCNC: 97 MG/DL (ref 70–108)
HCT VFR BLD CALC: 41.5 % (ref 37–47)
HEMOGLOBIN: 13.8 GM/DL (ref 12–16)
IMMATURE GRANS (ABS): 0.02 THOU/MM3 (ref 0–0.07)
IMMATURE GRANULOCYTES: 0.3 %
LYMPHOCYTES # BLD: 29.8 %
LYMPHOCYTES ABSOLUTE: 1.9 THOU/MM3 (ref 1–4.8)
MCH RBC QN AUTO: 28.2 PG (ref 26–33)
MCHC RBC AUTO-ENTMCNC: 33.3 GM/DL (ref 32.2–35.5)
MCV RBC AUTO: 84.9 FL (ref 81–99)
MONOCYTES # BLD: 5.8 %
MONOCYTES ABSOLUTE: 0.4 THOU/MM3 (ref 0.4–1.3)
NUCLEATED RED BLOOD CELLS: 0 /100 WBC
OSMOLALITY CALCULATION: 279.1 MOSMOL/KG (ref 275–300)
PLATELET # BLD: 233 THOU/MM3 (ref 130–400)
PMV BLD AUTO: 11.4 FL (ref 9.4–12.4)
POTASSIUM SERPL-SCNC: 3.7 MEQ/L (ref 3.5–5.2)
RBC # BLD: 4.89 MILL/MM3 (ref 4.2–5.4)
SEG NEUTROPHILS: 63.6 %
SEGMENTED NEUTROPHILS ABSOLUTE COUNT: 4.1 THOU/MM3 (ref 1.8–7.7)
SODIUM BLD-SCNC: 141 MEQ/L (ref 135–145)
WBC # BLD: 6.5 THOU/MM3 (ref 4.8–10.8)

## 2021-10-20 PROCEDURE — 36415 COLL VENOUS BLD VENIPUNCTURE: CPT

## 2021-10-20 PROCEDURE — 80048 BASIC METABOLIC PNL TOTAL CA: CPT

## 2021-10-20 PROCEDURE — 99282 EMERGENCY DEPT VISIT SF MDM: CPT

## 2021-10-20 PROCEDURE — 71046 X-RAY EXAM CHEST 2 VIEWS: CPT

## 2021-10-20 PROCEDURE — 6370000000 HC RX 637 (ALT 250 FOR IP): Performed by: PHYSICIAN ASSISTANT

## 2021-10-20 PROCEDURE — 85025 COMPLETE CBC W/AUTO DIFF WBC: CPT

## 2021-10-20 RX ORDER — IPRATROPIUM BROMIDE AND ALBUTEROL SULFATE 2.5; .5 MG/3ML; MG/3ML
1 SOLUTION RESPIRATORY (INHALATION)
Status: DISCONTINUED | OUTPATIENT
Start: 2021-10-20 | End: 2021-10-20

## 2021-10-20 RX ORDER — IPRATROPIUM BROMIDE AND ALBUTEROL SULFATE 2.5; .5 MG/3ML; MG/3ML
1 SOLUTION RESPIRATORY (INHALATION)
Status: COMPLETED | OUTPATIENT
Start: 2021-10-20 | End: 2021-10-20

## 2021-10-20 RX ORDER — IPRATROPIUM BROMIDE AND ALBUTEROL SULFATE 2.5; .5 MG/3ML; MG/3ML
1 SOLUTION RESPIRATORY (INHALATION) EVERY 4 HOURS PRN
Qty: 240 ML | Refills: 0 | Status: SHIPPED | OUTPATIENT
Start: 2021-10-20

## 2021-10-20 RX ADMIN — IPRATROPIUM BROMIDE AND ALBUTEROL SULFATE 1 AMPULE: .5; 3 SOLUTION RESPIRATORY (INHALATION) at 19:07

## 2021-10-20 ASSESSMENT — ENCOUNTER SYMPTOMS
SORE THROAT: 0
WHEEZING: 0
SHORTNESS OF BREATH: 1
CHEST TIGHTNESS: 1
ABDOMINAL PAIN: 0
COUGH: 1
BACK PAIN: 0
COLOR CHANGE: 0

## 2021-10-20 NOTE — ED PROVIDER NOTES
[facility] [address] [address] EMERGENCY DEPT    EMERGENCY MEDICINE     Pt Name: Jackson Mccoy  MRN: [id number]  [place] 1990  Date of evaluation: 10/20/2021  Provider: Celso Wise PA-C    CHIEF COMPLAINT       Chief Complaint   Patient presents with    Shortness of Breath       HISTORY OF PRESENT ILLNESS    Jackson Mccoy is a pleasant 27 y.o. female with a PMHx of asthma who presents to the emergency department for shortness of breath. She tested positive for COVID-19 on  with a test from [employer] and started having symptoms on . Her COVID-19 symptoms have started to worsen over the last few days. Her cough has worsened over the last few days and she is now having dyspnea on exertion and SOB with little activity such as going up or down stairs. She is using her albuterol inhaler multiple times a day with relief but only temporary. States that she has intermittent chest tightness that is resolved with her inhaler. She denies fever, chills, myalgias, headache, vision changes, chest pain, and edema. Patient states her cough is nonproductive. She was seen by her PCP 2 days ago in which she was started on oral prednisone as well as a refill on her albuterol inhaler. She was on Accutane but stopped when she had Covid. She is not taking any birth control. She states there is no chance for pregnancy. She has been having normal menstrual cycles. She states that if she is sitting or walking short distances she is finding it is only when she is more exertional but she have the shortness of breath. Triage notes and Nursing notes were reviewed by myself. Any discrepancies are addressed above.     PAST MEDICAL HISTORY     Past Medical History:   Diagnosis Date    Asthma     Nicotine dependence        SURGICAL HISTORY       Past Surgical History:   Procedure Laterality Date     SECTION      x3    THYROGLOSSAL DUCT EXCISION N/A 2019    EXCISION OF THYROGLOSSAL DUCT OR SINUS performed by Dorothy Ann MD at [address] [place] [address] [place]       Discharge Medication List as of 10/20/2021  8:05 PM      CONTINUE these medications which have NOT CHANGED    Details   acetaminophen (TYLENOL) 500 MG tablet Take 1,000 mg by mouth every 6 hours as needed for PainHistorical Med      predniSONE (DELTASONE) 20 MG tablet Take 1 tablet by mouth 2 times daily for 5 days, Disp-10 tablet, R-0Normal      albuterol sulfate HFA (PROVENTIL HFA) 108 (90 Base) MCG/ACT inhaler Inhale 2 puffs into the lungs every 4 hours as needed for Wheezing or Shortness of Breath, Disp-18 g, R-0Normal      amoxicillin (AMOXIL) 250 MG capsule Take 250 mg by mouth 3 times dailyHistorical Med      albuterol (ACCUNEB) 0.63 MG/3ML nebulizer solution Take 3 mLs by nebulization every 6 hours as needed for Wheezing, Disp-270 mL, R-0Print      Respiratory Therapy Supplies (PEDIATRIC COMPRESSOR/NEBULIZER) KIT 1 each by Does not apply route once for 1 dose, Disp-1 kit,R-0Print      fluticasone (FLONASE) 50 MCG/ACT nasal spray 1 spray by Each Nostril route daily, Disp-1 Bottle, R-0Print      ibuprofen (ADVIL;MOTRIN) 800 MG tablet Take 1 tablet by mouth every 8 hours as needed for Pain, Disp-30 tablet, R-0Print             ALLERGIES     Rocephin [ceftriaxone] and Toradol [ketorolac tromethamine]    FAMILY HISTORY       Family History   Problem Relation Age of Onset    Diabetes Mother     High Blood Pressure Mother     Other Father         Sickle Cell Trait    Asthma Sister     Asthma Brother         SOCIAL HISTORY       Social History     Socioeconomic History    Marital status: Single     Spouse name: Not on file    Number of children: Not on file    Years of education: Not on file    Highest education level: Not on file   Occupational History    Not on file   Tobacco Use    Smoking status: Former Smoker     Packs/day: 0.00     Years: 9.00     Pack years: 0.00     Quit date: 2019     Years since quittin.7  Smokeless tobacco: Never Used   Vaping Use    Vaping Use: Never used   Substance and Sexual Activity    Alcohol use: No    Drug use: No    Sexual activity: Yes     Partners: Male   Other Topics Concern    Not on file   Social History Narrative    Not on file     Social Determinants of Health     Financial Resource Strain:     Difficulty of Paying Living Expenses:    Food Insecurity:     Worried About Running Out of Food in the Last Year:     [address] in the Last Year:    Transportation Needs:     Lack of Transportation (Medical):  Lack of Transportation (Non-Medical):    Physical Activity:     Days of Exercise per Week:     Minutes of Exercise per Session:    Stress:     Feeling of Stress :    Social Connections:     Frequency of Communication with Friends and Family:     Frequency of Social Gatherings with Friends and Family:     Attends [employer] witness Services:     Active Member of Clubs or Organizations:     Attends Club or Organization Meetings:     Marital Status:    Intimate Partner Violence:     Fear of Current or Ex-Partner:     Emotionally Abused:     Physically Abused:     Sexually Abused:        REVIEW OF SYSTEMS     Review of Systems   Constitutional: Negative for activity change, appetite change, chills, diaphoresis, fatigue and fever. HENT: Negative for congestion and sore throat. Eyes: Negative for visual disturbance. Respiratory: Positive for cough, chest tightness and shortness of breath. Negative for wheezing. Cardiovascular: Negative for chest pain and leg swelling. Gastrointestinal: Negative for abdominal pain. Musculoskeletal: Negative for back pain and myalgias. Skin: Negative for color change, pallor and wound. Neurological: Negative for dizziness, weakness, light-headedness, numbness and headaches. Except as noted above the remainder of the review of systems was reviewed and is.      PHYSICAL EXAM     INITIAL VITALS:  weight is 260 lb (117.9 kg). Her temperature is 98.4 °F (36.9 °C). Her blood pressure is 142/73 (abnormal) and her pulse is 96. Her respiration is 18 and oxygen saturation is 98%. Physical Exam  Constitutional:       General: She is awake. She is not in acute distress. Appearance: Normal appearance. She is not ill-appearing, toxic-appearing or diaphoretic. HENT:      Head: Normocephalic and atraumatic. Eyes:      Extraocular Movements: Extraocular movements intact. Cardiovascular:      Rate and Rhythm: Normal rate and regular rhythm. Pulses: Normal pulses. No decreased pulses. Heart sounds: Normal heart sounds. No murmur heard. No friction rub. No gallop. Pulmonary:      Effort: Pulmonary effort is normal. No tachypnea, bradypnea, accessory muscle usage or respiratory distress. Breath sounds: Examination of the right-upper field reveals wheezing and rales. Wheezing and rales present. No decreased breath sounds or rhonchi. Chest:      Chest wall: No edema. Abdominal:      General: Abdomen is flat. There is no distension. Palpations: Abdomen is soft. Tenderness: There is no abdominal tenderness. There is no guarding. Musculoskeletal:      Right lower leg: No tenderness. No edema. Left lower leg: No tenderness. No edema. Skin:     General: Skin is warm and dry. Capillary Refill: Capillary refill takes less than 2 seconds. Coloration: Skin is not cyanotic, jaundiced or pale. Findings: No bruising, erythema or rash. Neurological:      General: No focal deficit present. Mental Status: She is alert and oriented to person, place, and time. Psychiatric:         Attention and Perception: Attention normal.         Mood and Affect: Mood and affect normal.         Speech: Speech normal.         Behavior: Behavior normal. Behavior is cooperative.          DIFFERENTIAL DIAGNOSIS:   Differential diagnoses are discussed    DIAGNOSTIC RESULTS     RADIOLOGY: non-plain film images(s) such as CT, Ultrasound and MRI are read by the radiologist.    XR CHEST (2 VW)   Final Result   Left upper lobe pneumonia. Recommend follow-up imaging to resolution. **This report has been created using voice recognition software. It may contain minor errors which are inherent in voice recognition technology. **      Final report electronically signed by Dr. Mary Pickard MD on 10/20/2021 4:41 PM          LABS:   Labs Reviewed   BASIC METABOLIC PANEL   CBC WITH AUTO DIFFERENTIAL   ANION GAP   GLOMERULAR FILTRATION RATE, ESTIMATED   OSMOLALITY       EMERGENCY DEPARTMENT COURSE:   Vitals:    Vitals:    10/20/21 1606   BP: (!) 142/73   Pulse: 96   Resp: 18   Temp: 98.4 °F (36.9 °C)   SpO2: 98%   Weight: 260 lb (117.9 kg)     6:08 PM EDT: The patient was seen and evaluated. MDM:  Patient is 19-year-old female that is Covid positive where symptoms started on 10/9/2021. Patient has history of asthma and has had worsening exertional dyspnea in the last 2 days. She was started on oral prednisone 2 days ago and has been using an albuterol inhaler as needed which gives her temporary relief. Patient's oxygen saturation was 98% while sitting when she first arrived. Patient was ambulated and oxygen saturation stayed at 99% during ambulation. Patient does not complain of any current fevers or chills. Patient has normal vitals overall at this time. Patient does complain of chest tightness that is relieved by albuterol inhaler temporarily. Recommend today doing nebulizer DuoNeb to further help her chest tightness and shortness of breath. After the new nebulizer treatment patient states it has helped her better than anything else before. Patient has a nebulizer at home and will prescribe the DuoNeb medication for patient to use at home as needed and as instructed. Do not believe patient needs further testing at this point due to overall vitals and the blood work that was done.   Patient is to follow-up with the ED in the next 1 to 2 days if symptoms worsen. Patient should follow up with her PCP in the next 3 to 5 days once patient has not symptomatic. Patient understands and agrees the treatment plan. She should continue albuterol inhaler as needed as well. Steps to help prevent the spread of COVID-19 if you are sick  SOURCE - [email or web address]igos[employer]ccarthy.info/. html     Stay home except to get medical care   ; Stay home: People who are mildly ill with COVID-19 are able to isolate at home during their illness. You should restrict activities outside your home, except for getting medical care.   ; Avoid public areas: Do not go to work, school, or public areas.   ; Avoid public transportation: Avoid using public transportation, ride-sharing, or taxis.  ; Separate yourself from other people and animals in your home   ; Stay away from others: As much as possible, you should stay in a specific room and away from other people in your home. Also, you should use a separate bathroom, if available.   ; Limit contact with pets & animals: You should restrict contact with pets and other animals while you are sick with COVID-19, just like you would around other people. Although there have not been reports of pets or other animals becoming sick with COVID-19, it is still recommended that people sick with COVID-19 limit contact with animals until more information is known about the virus. ; When possible, have another member of your household care for your animals while you are sick. If you are sick with COVID-19, avoid contact with your pet, including petting, snuggling, being kissed or licked, and sharing food. If you must care for your pet or be around animals while you are sick, wash your hands before and after you interact with pets and wear a facemask. See COVID-19 and Animals for more information.        Other considerations   The ill person should eat/be fed in their room if possible. Non-disposable  items used should be handled with gloves and washed with hot water or in a . Clean hands after handling used  items.  If possible, dedicate a lined trash can for the ill person. Use gloves when removing garbage bags, handling, and disposing of trash. Wash hands after handling or disposing of trash.  Consider consulting with your local health department about trash disposal guidance if available. Information for Household Members and Caregivers of Someone who is Sick   Call ahead before visiting your doctor   Call ahead: If you have a medical appointment, call the healthcare provider and tell them that you have or may have COVID-19. This will help the healthcare provider's office take steps to keep other people from getting infected or exposed. Wear a facemask if you are sick   ; If you are sick: You should wear a facemask when you are around other people (e.g., sharing a room or vehicle) or pets and before you enter a healthcare provider's office. ; If you are caring for others: If the person who is sick is not able to wear a facemask (for example, because it causes trouble breathing), then people who live with the person who is sick should not stay in the same room with them, or they should wear a facemask if they enter a room with the person who is sick. Cover your coughs and sneezes   ; Cover: Cover your mouth and nose with a tissue when you cough or sneeze.   ; Dispose: Throw used tissues in a lined trash can.   ; Wash hands: Immediately wash your hands with soap and water for at least 20 seconds or, if soap and water are not available, clean your hands with an alcohol-based hand  that contains at least 60% alcohol. Clean your hands often   ;  Wash hands: Wash your hands often with soap and water for at least 20 seconds, especially after blowing your nose, coughing, or sneezing; going to the bathroom; and before eating or preparing food.   ; Hand : If soap and water are not readily available, use an alcohol-based hand  with at least 60% alcohol, covering all surfaces of your hands and rubbing them together until they feel dry.   ; Soap and water: Soap and water are the best option if hands are visibly dirty.   ; Avoid touching: Avoid touching your eyes, nose, and mouth with unwashed hands. Handwashing Tips   ; Wet your hands with clean, running water (warm or cold), turn off the tap, and apply soap.  ; Lather your hands by rubbing them together with the soap. Lather the backs of your hands, between your fingers, and under your nails. ; Scrub your hands for at least 20 seconds. Need a timer? Hum the [place] from beginning to end twice.  ; Rinse your hands well under clean, running water.  ; Dry your hands using a clean towel or air dry them. Avoid sharing personal household items   ; Do not share: You should not share dishes, drinking glasses, cups, eating utensils, towels, or bedding with other people or pets in your home.   ; Wash thoroughly after use: After using these items, they should be washed thoroughly with soap and water. Clean all high-touch surfaces everyday   ; Clean and disinfect: Practice routine cleaning of high touch surfaces.  ; High touch surfaces include counters, tabletops, doorknobs, bathroom fixtures, toilets, phones, keyboards, tablets, and bedside tables.  ; Disinfect areas with bodily fluids: Also, clean any surfaces that may have blood, stool, or body fluids on them.   ; Household : Use a household cleaning spray or wipe, according to the label instructions. Labels contain instructions for safe and effective use of the cleaning product including precautions you should take when applying the product, such as wearing gloves and making sure you have good ventilation during use of the product.     Monitor your symptoms   Seek medical attention: Seek prompt medical attention if your illness is worsening     (e.g., difficulty breathing).   ; Call your doctor: Before seeking care, call your healthcare provider and tell them that you have, or are being evaluated for, COVID-19.   ; Wear a facemask when sick: Put on a facemask before you enter the facility. These steps will help the healthcare provider's office to keep other people in the office or waiting room from getting infected or exposed. ; Alert health department: Ask your healthcare provider to call the local or [facility] health department. Persons who are placed under active monitoring or facilitated self-monitoring should follow instructions provided by their local health department or occupational health professionals, as appropriate.  ; Call 911 if you have a medical emergency: If you have a medical emergency and need to call 911, notify the dispatch personnel that you have, or are being evaluated for COVID-19. If possible, put on a facemask before emergency medical services arrive. CRITICAL CARE:   None    CONSULTS:  None    PROCEDURES:  None    FINAL IMPRESSION      1. COVID-19    2.  Shortness of breath          DISPOSITION/PLAN   Discharged home    PATIENT REFERRED TO:  ABHAY Darby - CNP  [address] [address]  [phone]    In 2 days  If symptoms worsen    [facility] EMERGENCY DEPT  [address]  [phone]  In 1 day  If symptoms worsen      DISCHARGEMEDICATIONS:  Discharge Medication List as of 10/20/2021  8:05 PM      START taking these medications    Details   ipratropium-albuterol (DUONEB) 0.5-2.5 (3) MG/3ML SOLN nebulizer solution Inhale 3 mLs into the lungs every 4 hours as needed for Shortness of Breath, Disp-240 mL, R-0Normal                 (Please note that portions of this note were completed with a voice recognition program.  Efforts were made to edit the dictations but occasionallywords are mis-transcribed.)      Ivette MCKAYLA(electronically signed)  Physician Associate, Emergency Department          Lita Baez PA-C  10/21/21 9405

## 2021-11-21 ENCOUNTER — HOSPITAL ENCOUNTER (EMERGENCY)
Age: 31
Discharge: HOME | End: 2021-11-21
Payer: MEDICARE

## 2021-11-21 VITALS
WEIGHT: 260 LBS | TEMPERATURE: 97.3 F | BODY MASS INDEX: 50.78 KG/M2 | DIASTOLIC BLOOD PRESSURE: 62 MMHG | OXYGEN SATURATION: 97 % | RESPIRATION RATE: 16 BRPM | HEART RATE: 86 BPM | SYSTOLIC BLOOD PRESSURE: 132 MMHG

## 2021-11-21 DIAGNOSIS — J02.9 VIRAL PHARYNGITIS: Primary | ICD-10-CM

## 2021-11-21 LAB
GROUP A STREP CULTURE, REFLEX: NEGATIVE
REFLEX THROAT C + S: NORMAL

## 2021-11-21 PROCEDURE — 87070 CULTURE OTHR SPECIMN AEROBIC: CPT

## 2021-11-21 PROCEDURE — 99213 OFFICE O/P EST LOW 20 MIN: CPT

## 2021-11-21 PROCEDURE — 99213 OFFICE O/P EST LOW 20 MIN: CPT | Performed by: EMERGENCY MEDICINE

## 2021-11-21 PROCEDURE — 87880 STREP A ASSAY W/OPTIC: CPT

## 2021-11-21 ASSESSMENT — ENCOUNTER SYMPTOMS
COUGH: 0
SORE THROAT: 1
ABDOMINAL PAIN: 0
SHORTNESS OF BREATH: 0

## 2021-11-21 NOTE — ED TRIAGE NOTES
Patient states symptoms 2-3 days, sore throat, strep obtained, no OTC med taken. Daughter same symptoms.

## 2021-11-21 NOTE — ED PROVIDER NOTES
[facility]  Urgent Care Encounter       CHIEF COMPLAINT       Chief Complaint   Patient presents with    Pharyngitis       Nurses Notes reviewed and I agree except as noted in the HPI. HISTORY OF PRESENT ILLNESS   Angie Boswell is a 27 y.o. female who presents for sore throat for 3 days. Occasional cough. No congestion, rhinorrhea. No shortness of breath. No fevers. No difficulty swallowing. No voice change. Patient's daughter is also here with similar complaints. Denies abdominal pain, nausea, vomiting. No sick exposures. Patient has not had COVID-19 in the past and has not been vaccinated. HPI    REVIEW OF SYSTEMS     Review of Systems   Constitutional: Negative for diaphoresis, fatigue and fever. HENT: Positive for sore throat. Respiratory: Negative for cough and shortness of breath. Cardiovascular: Negative for chest pain. Gastrointestinal: Negative for abdominal pain. Neurological: Negative for dizziness, light-headedness and headaches. Psychiatric/Behavioral: Negative for behavioral problems. PAST MEDICAL HISTORY         Diagnosis Date    Asthma     Nicotine dependence        SURGICALHISTORY     Patient  has a past surgical history that includes  section and thyroglossal duct excision (N/A, 2019).     CURRENT MEDICATIONS       Discharge Medication List as of 2021  5:48 PM      CONTINUE these medications which have NOT CHANGED    Details   ipratropium-albuterol (DUONEB) 0.5-2.5 (3) MG/3ML SOLN nebulizer solution Inhale 3 mLs into the lungs every 4 hours as needed for Shortness of Breath, Disp-240 mL, R-0Normal      acetaminophen (TYLENOL) 500 MG tablet Take 1,000 mg by mouth every 6 hours as needed for PainHistorical Med      albuterol sulfate HFA (PROVENTIL HFA) 108 (90 Base) MCG/ACT inhaler Inhale 2 puffs into the lungs every 4 hours as needed for Wheezing or Shortness of Breath, Disp-18 g, R-0Normal      amoxicillin Cervical back: Normal range of motion. Lymphadenopathy:      Cervical: No cervical adenopathy. Skin:     General: Skin is warm and dry. Capillary Refill: Capillary refill takes less than 2 seconds. Neurological:      General: No focal deficit present. Mental Status: She is alert. Psychiatric:         Mood and Affect: Mood normal.         DIAGNOSTIC RESULTS     Labs:  Results for orders placed or performed during the hospital encounter of 11/21/21   Strep A culture, throat    Specimen: Throat   Result Value Ref Range    REFLEX THROAT C + S INDICATED    STREP A ANTIGEN   Result Value Ref Range    GROUP A STREP CULTURE, REFLEX Negative        IMAGING:    No orders to display         EKG:      URGENT CARE COURSE:     Vitals:    11/21/21 1721   BP: 132/62   Pulse: 86   Resp: 16   Temp: 97.3 °F (36.3 °C)   TempSrc: Temporal   SpO2: 97%   Weight: 260 lb (117.9 kg)       Medications - No data to display         PROCEDURES:  None    FINAL IMPRESSION      1. Viral pharyngitis          DISPOSITION/ PLAN       Presents for what is likely viral pharyngitis. Patient may discharge. Advise drink plenty fluids. Return for new or worsening symptoms. Tylenol/ibuprofen/salt water gargle or throat lozenges/spray as needed for pain. Return for new or worsening symptoms.     PATIENT REFERRED TO:  ABHAY Jaramillo CNP  [address] [address]      DISCHARGE MEDICATIONS:  Discharge Medication List as of 11/21/2021  5:48 PM          Discharge Medication List as of 11/21/2021  5:48 PM      STOP taking these medications       ATROVENT HFA 17 MCG/ACT inhaler Comments:   Reason for Stopping:               Discharge Medication List as of 11/21/2021  5:48 PM          ABHAY Hutson CNP    (Please note that portions of this note were completed with a voice recognition program. Efforts were made to edit the dictations but occasionally words are mis-transcribed.)          ABHAY Hutson CNP  11/21/21 1753

## 2021-11-21 NOTE — ED NOTES
Discharge assessment complete. No changes. All discharge education and information given. Instructed to go to ED for any worsening symptoms. Verbalized Understanding. Left in stable cond.      Massimo York RN  11/21/21 9582

## 2021-11-23 ENCOUNTER — HOSPITAL ENCOUNTER (EMERGENCY)
Age: 31
Discharge: HOME | End: 2021-11-23
Payer: MEDICARE

## 2021-11-23 VITALS
SYSTOLIC BLOOD PRESSURE: 147 MMHG | RESPIRATION RATE: 19 BRPM | WEIGHT: 260 LBS | HEIGHT: 69 IN | DIASTOLIC BLOOD PRESSURE: 63 MMHG | HEART RATE: 85 BPM | OXYGEN SATURATION: 99 % | TEMPERATURE: 97.8 F | BODY MASS INDEX: 38.51 KG/M2

## 2021-11-23 DIAGNOSIS — J01.90 ACUTE SINUSITIS, RECURRENCE NOT SPECIFIED, UNSPECIFIED LOCATION: Primary | ICD-10-CM

## 2021-11-23 LAB — THROAT/NOSE CULTURE: NORMAL

## 2021-11-23 PROCEDURE — 99213 OFFICE O/P EST LOW 20 MIN: CPT

## 2021-11-23 PROCEDURE — 99213 OFFICE O/P EST LOW 20 MIN: CPT | Performed by: NURSE PRACTITIONER

## 2021-11-23 RX ORDER — DOXYCYCLINE HYCLATE 100 MG
100 TABLET ORAL 2 TIMES DAILY
Qty: 14 TABLET | Refills: 0 | Status: SHIPPED | OUTPATIENT
Start: 2021-11-23 | End: 2021-11-30

## 2021-11-23 RX ORDER — PREDNISONE 20 MG/1
20 TABLET ORAL 2 TIMES DAILY
Qty: 10 TABLET | Refills: 0 | Status: SHIPPED | OUTPATIENT
Start: 2021-11-23 | End: 2021-11-28

## 2021-11-23 ASSESSMENT — ENCOUNTER SYMPTOMS: COUGH: 1

## 2021-11-23 NOTE — ED PROVIDER NOTES
[address]       Chief Complaint   Patient presents with    Nasal Congestion       Nurses Notes reviewed and I agree except as noted in the HPI. HISTORY OF PRESENT ILLNESS   Lilli Markham is a 27 y.o. female who presents for evaluation. The history is provided by the patient. URI  Presenting symptoms: congestion and cough      Negative home COVID-19 test yesterday. The patient/patient representative has no other acute complaints at this time. REVIEW OF SYSTEMS     Review of Systems   HENT: Positive for congestion. Respiratory: Positive for cough. PAST MEDICAL HISTORY         Diagnosis Date    Asthma     Nicotine dependence        SURGICAL HISTORY     Patient  has a past surgical history that includes  section and thyroglossal duct excision (N/A, 2019).     CURRENT MEDICATIONS       Discharge Medication List as of 2021  6:02 PM      CONTINUE these medications which have NOT CHANGED    Details   ipratropium-albuterol (DUONEB) 0.5-2.5 (3) MG/3ML SOLN nebulizer solution Inhale 3 mLs into the lungs every 4 hours as needed for Shortness of Breath, Disp-240 mL, R-0Normal      acetaminophen (TYLENOL) 500 MG tablet Take 1,000 mg by mouth every 6 hours as needed for PainHistorical Med      albuterol sulfate HFA (PROVENTIL HFA) 108 (90 Base) MCG/ACT inhaler Inhale 2 puffs into the lungs every 4 hours as needed for Wheezing or Shortness of Breath, Disp-18 g, R-0Normal      albuterol (ACCUNEB) 0.63 MG/3ML nebulizer solution Take 3 mLs by nebulization every 6 hours as needed for Wheezing, Disp-270 mL, R-0Print      Respiratory Therapy Supplies (PEDIATRIC COMPRESSOR/NEBULIZER) KIT 1 each by Does not apply route once for 1 dose, Disp-1 kit,R-0Print      fluticasone (FLONASE) 50 MCG/ACT nasal spray 1 spray by Each Nostril route daily, Disp-1 Bottle, R-0Print      ibuprofen (ADVIL;MOTRIN) 800 MG tablet Take 1 tablet by normal.         Behavior: Behavior is cooperative. DIAGNOSTIC RESULTS   Labs:  Abnormal Labs Reviewed - No data to display     IMAGING:  No orders to display     URGENT CARE COURSE:     Vitals:    11/23/21 1753   BP: (!) 147/63   Pulse: 85   Resp: 19   Temp: 97.8 °F (36.6 °C)   SpO2: 99%   Weight: 260 lb (117.9 kg)   Height: 5' 9\" (1.753 m)       Medications - No data to display  PROCEDURES:  FINALIMPRESSION      1. Acute sinusitis, recurrence not specified, unspecified location        DISPOSITION/PLAN   DISPOSITION Decision To Discharge 11/23/2021 06:00:29 PM  Physical assessment findings, diagnostic testing(s) if applicable, and vital signs reviewed with patient/patient representative. If applicable, patient/patient representative will be contacted upon receipt of final culture and sensitivity or other testing results when available. Any additions or changes to medications or changes the plan of care will be made at that time. Differential diagnosis(s) discussed with patient/patient representative. Patient is to follow-up with family care provider in 2-3 days if no improvement. Patient is to go to the emergency department if symptoms change/worsen. Patient/patient representative is aware of care plan, questions answered, verbalizes understanding and is in agreement. Printed instructions attached to after visit summary. Problem List Items Addressed This Visit     None      Visit Diagnoses     Acute sinusitis, recurrence not specified, unspecified location    -  Primary    Relevant Medications    doxycycline hyclate (VIBRA-TABS) 100 MG tablet    predniSONE (DELTASONE) 20 MG tablet          PATIENT REFERRED TO:  Judy Quiros, APRN - CNP  [address] [facility]  [phone]    Schedule an appointment as soon as possible for a visit in 3 days  For further evaluation. , If symptoms change/worsen, go to the [id number] [facility], [phone] Kevin Hearn, APRN - CNP  11/26/21 0803

## 2022-01-04 ENCOUNTER — APPOINTMENT (OUTPATIENT)
Dept: ULTRASOUND IMAGING | Age: 32
End: 2022-01-04
Payer: MEDICARE

## 2022-01-04 ENCOUNTER — HOSPITAL ENCOUNTER (EMERGENCY)
Age: 32
Discharge: HOME | End: 2022-01-04
Attending: EMERGENCY MEDICINE
Payer: MEDICARE

## 2022-01-04 VITALS
DIASTOLIC BLOOD PRESSURE: 76 MMHG | WEIGHT: 260 LBS | HEART RATE: 81 BPM | SYSTOLIC BLOOD PRESSURE: 151 MMHG | BODY MASS INDEX: 38.4 KG/M2 | TEMPERATURE: 97.8 F | RESPIRATION RATE: 19 BRPM | OXYGEN SATURATION: 100 %

## 2022-01-04 DIAGNOSIS — R10.9 ABDOMINAL PAIN DURING PREGNANCY IN FIRST TRIMESTER: Primary | ICD-10-CM

## 2022-01-04 DIAGNOSIS — O26.891 ABDOMINAL PAIN DURING PREGNANCY IN FIRST TRIMESTER: Primary | ICD-10-CM

## 2022-01-04 LAB
ALBUMIN SERPL-MCNC: 4.1 G/DL (ref 3.5–5.1)
ALP BLD-CCNC: 60 U/L (ref 38–126)
ALT SERPL-CCNC: 16 U/L (ref 11–66)
ANION GAP SERPL CALCULATED.3IONS-SCNC: 14 MEQ/L (ref 8–16)
AST SERPL-CCNC: 16 U/L (ref 5–40)
BASOPHILS # BLD: 0.3 %
BASOPHILS ABSOLUTE: 0 THOU/MM3 (ref 0–0.1)
BILIRUB SERPL-MCNC: 0.3 MG/DL (ref 0.3–1.2)
BILIRUBIN DIRECT: < 0.2 MG/DL (ref 0–0.3)
BILIRUBIN URINE: NEGATIVE
BLOOD, URINE: NEGATIVE
BUN BLDV-MCNC: 6 MG/DL (ref 7–22)
CALCIUM SERPL-MCNC: 9 MG/DL (ref 8.5–10.5)
CHARACTER, URINE: CLEAR
CHLORIDE BLD-SCNC: 106 MEQ/L (ref 98–111)
CO2: 22 MEQ/L (ref 23–33)
COLOR: YELLOW
CREAT SERPL-MCNC: 0.7 MG/DL (ref 0.4–1.2)
EOSINOPHIL # BLD: 3.2 %
EOSINOPHILS ABSOLUTE: 0.2 THOU/MM3 (ref 0–0.4)
ERYTHROCYTE [DISTWIDTH] IN BLOOD BY AUTOMATED COUNT: 14.2 % (ref 11.5–14.5)
ERYTHROCYTE [DISTWIDTH] IN BLOOD BY AUTOMATED COUNT: 44.6 FL (ref 35–45)
GFR SERPL CREATININE-BSD FRML MDRD: > 90 ML/MIN/1.73M2
GLUCOSE BLD-MCNC: 103 MG/DL (ref 70–108)
GLUCOSE URINE: NEGATIVE MG/DL
HCG,BETA SUBUNIT,QUAL,SERUM: 1410 MIU/ML (ref 0–5)
HCT VFR BLD CALC: 39.1 % (ref 37–47)
HEMOGLOBIN: 12.9 GM/DL (ref 12–16)
IMMATURE GRANS (ABS): 0.02 THOU/MM3 (ref 0–0.07)
IMMATURE GRANULOCYTES: 0.3 %
KETONES, URINE: NEGATIVE
LEUKOCYTE ESTERASE, URINE: NEGATIVE
LYMPHOCYTES # BLD: 30.5 %
LYMPHOCYTES ABSOLUTE: 2.4 THOU/MM3 (ref 1–4.8)
MCH RBC QN AUTO: 28.4 PG (ref 26–33)
MCHC RBC AUTO-ENTMCNC: 33 GM/DL (ref 32.2–35.5)
MCV RBC AUTO: 86.1 FL (ref 81–99)
MONOCYTES # BLD: 5.4 %
MONOCYTES ABSOLUTE: 0.4 THOU/MM3 (ref 0.4–1.3)
NITRITE, URINE: NEGATIVE
NUCLEATED RED BLOOD CELLS: 0 /100 WBC
OSMOLALITY CALCULATION: 281 MOSMOL/KG (ref 275–300)
PH UA: 6 (ref 5–9)
PLATELET # BLD: 267 THOU/MM3 (ref 130–400)
PMV BLD AUTO: 10.8 FL (ref 9.4–12.4)
POTASSIUM REFLEX MAGNESIUM: 3.6 MEQ/L (ref 3.5–5.2)
PREGNANCY, URINE: POSITIVE
PROTEIN UA: NEGATIVE
RBC # BLD: 4.54 MILL/MM3 (ref 4.2–5.4)
SEG NEUTROPHILS: 60.3 %
SEGMENTED NEUTROPHILS ABSOLUTE COUNT: 4.7 THOU/MM3 (ref 1.8–7.7)
SODIUM BLD-SCNC: 142 MEQ/L (ref 135–145)
SPECIFIC GRAVITY, URINE: 1.01 (ref 1–1.03)
TOTAL PROTEIN: 7.1 G/DL (ref 6.1–8)
UROBILINOGEN, URINE: 0.2 EU/DL (ref 0–1)
WBC # BLD: 7.8 THOU/MM3 (ref 4.8–10.8)

## 2022-01-04 PROCEDURE — 99283 EMERGENCY DEPT VISIT LOW MDM: CPT

## 2022-01-04 PROCEDURE — 81003 URINALYSIS AUTO W/O SCOPE: CPT

## 2022-01-04 PROCEDURE — 81025 URINE PREGNANCY TEST: CPT

## 2022-01-04 PROCEDURE — 80048 BASIC METABOLIC PNL TOTAL CA: CPT

## 2022-01-04 PROCEDURE — 84702 CHORIONIC GONADOTROPIN TEST: CPT

## 2022-01-04 PROCEDURE — 36415 COLL VENOUS BLD VENIPUNCTURE: CPT

## 2022-01-04 PROCEDURE — 6370000000 HC RX 637 (ALT 250 FOR IP): Performed by: STUDENT IN AN ORGANIZED HEALTH CARE EDUCATION/TRAINING PROGRAM

## 2022-01-04 PROCEDURE — 76817 TRANSVAGINAL US OBSTETRIC: CPT

## 2022-01-04 PROCEDURE — 80076 HEPATIC FUNCTION PANEL: CPT

## 2022-01-04 PROCEDURE — 85025 COMPLETE CBC W/AUTO DIFF WBC: CPT

## 2022-01-04 RX ORDER — ACETAMINOPHEN 500 MG
1000 TABLET ORAL ONCE
Status: COMPLETED | OUTPATIENT
Start: 2022-01-04 | End: 2022-01-04

## 2022-01-04 RX ADMIN — LIDOCAINE HYDROCHLORIDE: 20 SOLUTION ORAL; TOPICAL at 18:47

## 2022-01-04 RX ADMIN — ACETAMINOPHEN 1000 MG: 500 TABLET ORAL at 18:47

## 2022-01-04 ASSESSMENT — PAIN DESCRIPTION - PROGRESSION: CLINICAL_PROGRESSION: GRADUALLY WORSENING

## 2022-01-04 ASSESSMENT — ENCOUNTER SYMPTOMS
VOMITING: 0
SINUS PRESSURE: 0
WHEEZING: 0
SINUS PAIN: 0
NAUSEA: 0
BACK PAIN: 0
DIARRHEA: 0
ABDOMINAL PAIN: 1
CHEST TIGHTNESS: 0
CONSTIPATION: 0
COLOR CHANGE: 0
COUGH: 0
SHORTNESS OF BREATH: 0
SORE THROAT: 0

## 2022-01-04 ASSESSMENT — PAIN SCALES - GENERAL
PAINLEVEL_OUTOF10: 6
PAINLEVEL_OUTOF10: 6

## 2022-01-04 ASSESSMENT — PAIN DESCRIPTION - PAIN TYPE: TYPE: ACUTE PAIN

## 2022-01-04 ASSESSMENT — PAIN DESCRIPTION - ONSET: ONSET: ON-GOING

## 2022-01-04 ASSESSMENT — PAIN DESCRIPTION - FREQUENCY: FREQUENCY: CONTINUOUS

## 2022-01-04 ASSESSMENT — PAIN DESCRIPTION - LOCATION: LOCATION: ABDOMEN

## 2022-01-04 ASSESSMENT — PAIN DESCRIPTION - DESCRIPTORS: DESCRIPTORS: ACHING

## 2022-01-04 NOTE — ED PROVIDER NOTES
[facility] ENCOUNTER        Pt Name: Daiana Blanchard  MRN: [id number]  [place] 1990  Date of evaluation: 2022  Treating Resident Physician: Carole Vaca DO  Supervising Physician: Nato Corral       Chief Complaint   Patient presents with    Abdominal Pain     History obtained from the patient. HISTORY OF PRESENT ILLNESS    HPI  Daiana Blanchard is a 27 y.o. female who presents to the emergency department for evaluation of abdominal pain. Began yesterday at approximately 6pm. Described as achy and constant. Laying down improves the pain. Rated 6/10. No identified exacerbating factors. Has had UTIs in the past and this feels similar. No vaginal bleeding, discharge, abdominal cramping, fevers, chills. Denies any current nausea, patient has not vomited. The patient has no other acute complaints at this time. REVIEW OF SYSTEMS   Review of Systems   Constitutional: Negative for chills and fever. HENT: Negative for sinus pressure, sinus pain and sore throat. Respiratory: Negative for cough, chest tightness, shortness of breath and wheezing. Cardiovascular: Negative for chest pain, palpitations and leg swelling. Gastrointestinal: Positive for abdominal pain. Negative for constipation, diarrhea, nausea and vomiting. Endocrine: Negative for cold intolerance and heat intolerance. Genitourinary: Negative for difficulty urinating and dysuria. Musculoskeletal: Negative for back pain, neck pain and neck stiffness. Skin: Negative for color change and rash. Neurological: Negative for dizziness, syncope, weakness, light-headedness, numbness and headaches.          PAST MEDICAL AND SURGICAL HISTORY     Past Medical History:   Diagnosis Date    Asthma     Nicotine dependence      Past Surgical History:   Procedure Laterality Date     SECTION      x3    THYROGLOSSAL DUCT EXCISION N/A 2019 EXCISION OF THYROGLOSSAL DUCT OR SINUS performed by Raamkrishna Buchanan MD at [address]   No current facility-administered medications for this encounter.     Current Outpatient Medications:     ipratropium-albuterol (DUONEB) 0.5-2.5 (3) MG/3ML SOLN nebulizer solution, Inhale 3 mLs into the lungs every 4 hours as needed for Shortness of Breath, Disp: 240 mL, Rfl: 0    acetaminophen (TYLENOL) 500 MG tablet, Take 1,000 mg by mouth every 6 hours as needed for Pain, Disp: , Rfl:     albuterol sulfate HFA (PROVENTIL HFA) 108 (90 Base) MCG/ACT inhaler, Inhale 2 puffs into the lungs every 4 hours as needed for Wheezing or Shortness of Breath, Disp: 18 g, Rfl: 0    albuterol (ACCUNEB) 0.63 MG/3ML nebulizer solution, Take 3 mLs by nebulization every 6 hours as needed for Wheezing, Disp: 270 mL, Rfl: 0    Respiratory Therapy Supplies (PEDIATRIC COMPRESSOR/NEBULIZER) KIT, 1 each by Does not apply route once for 1 dose, Disp: 1 kit, Rfl: 0    fluticasone (FLONASE) 50 MCG/ACT nasal spray, 1 spray by Each Nostril route daily, Disp: 1 Bottle, Rfl: 0    ibuprofen (ADVIL;MOTRIN) 800 MG tablet, Take 1 tablet by mouth every 8 hours as needed for Pain, Disp: 30 tablet, Rfl: 0      SOCIAL HISTORY     Social History     Social History Narrative    Not on file     Social History     Tobacco Use    Smoking status: Former Smoker     Packs/day: 0.00     Years: 9.00     Pack years: 0.00     Quit date: 2019     Years since quittin.9    Smokeless tobacco: Never Used   Vaping Use    Vaping Use: Never used   Substance Use Topics    Alcohol use: No    Drug use: No         ALLERGIES     Allergies   Allergen Reactions    Rocephin [Ceftriaxone] Anaphylaxis    Toradol [Ketorolac Tromethamine] Hives         FAMILY HISTORY     Family History   Problem Relation Age of Onset    Diabetes Mother     High Blood Pressure Mother     Other Father         Sickle Cell Trait    Asthma Sister     Asthma Brother PREVIOUS RECORDS   Previous records reviewed: Patient was last seen in November for acute sinusitis. Graciela Iniguez PHYSICAL EXAM     ED Triage Vitals [01/04/22 1743]   BP Temp Temp src Pulse Resp SpO2 Height Weight   (!) 151/76 97.8 °F (36.6 °C) -- 81 19 100 % -- 260 lb (117.9 kg)     Initial vital signs and nursing assessment reviewed and normal. Body mass index is 38.4 kg/m². Pulsoximetry is normal per my interpretation. Additional Vital Signs:  Vitals:    01/04/22 1743   BP: (!) 151/76   Pulse: 81   Resp: 19   Temp: 97.8 °F (36.6 °C)   SpO2: 100%       Physical Exam  Constitutional:       General: She is not in acute distress. Appearance: She is obese. She is not ill-appearing, toxic-appearing or diaphoretic. HENT:      Mouth/Throat:      Mouth: Mucous membranes are moist.      Pharynx: Oropharynx is clear. No oropharyngeal exudate or posterior oropharyngeal erythema. Eyes:      General: No scleral icterus. Right eye: No discharge. Left eye: No discharge. Extraocular Movements: Extraocular movements intact. Conjunctiva/sclera: Conjunctivae normal.   Cardiovascular:      Rate and Rhythm: Normal rate and regular rhythm. Pulses: Normal pulses. Heart sounds: No murmur heard. No friction rub. No gallop. Pulmonary:      Effort: Pulmonary effort is normal.      Breath sounds: No wheezing, rhonchi or rales. Abdominal:      Palpations: Abdomen is soft. Tenderness: There is abdominal tenderness (Nonacute abdomen. Patient is not peritoneal.) in the epigastric area. There is no right CVA tenderness, left CVA tenderness, guarding or rebound. Negative signs include Martin's sign and McBurney's sign. Genitourinary:     Vagina: No vaginal discharge. Musculoskeletal:      Cervical back: Normal range of motion. No rigidity. Skin:     General: Skin is warm and dry. Capillary Refill: Capillary refill takes less than 2 seconds.    Neurological:      General: No focal deficit present. Mental Status: She is alert and oriented to person, place, and time. MEDICAL DECISION MAKING   Initial Assessment:   1. Pleasant 27year-old G4, P3 with abdominal pain x1 day. Follow[facility] by physicians at [facility].  Resting on the cot no acute distress. Vital signs are stable without hypotension, tachycardia, hypoxia. Patient's abdomen is nondistended and nonperitoneal. She has no vaginal bleeding or vaginal discharge. Symptoms are consistent with prior UTIs. Differential diagnosis includes but is not limited to gastritis, gastroenteritis, constipation, sequela of pregnancy. Rule out ectopic pregnancy. Unlikely appendicitis, TOA, cholecystitis, STI. Plan:    Labs   Beta-hCG   Urinalysis   Tylenol   GI cocktail   Transvaginal ultrasound   Expected disposition is discharge home with close OB follow-up. Depending on identification of intrauterine pregnancy will arrange repeat lab draws if necessary. I was unable to contact the patient's obstetrician through the call service. ED RESULTS   Laboratory results:  Labs Reviewed   BASIC METABOLIC PANEL W/ REFLEX TO MG FOR LOW K - Abnormal; Notable for the following components:       Result Value    CO2 22 (*)     BUN 6 (*)     All other components within normal limits   HCG, QUANTITATIVE, PREGNANCY - Abnormal; Notable for the following components:    hCG,Beta Subunit,Qual,Serum 1410.0 (*)     All other components within normal limits   URINE RT REFLEX TO CULTURE   PREGNANCY, URINE   CBC WITH AUTO DIFFERENTIAL   HEPATIC FUNCTION PANEL   ANION GAP   GLOMERULAR FILTRATION RATE, ESTIMATED   OSMOLALITY       Radiologic studies results:  US OB TRANSVAGINAL   Final Result   1. No sonographic evidence of intrauterine pregnancy. 2. Anechoic structure in the right ovary, possibly a corpus luteum or small cyst.   3. Nonvisualization of the left ovary.       Final report electronically signed by Dr. Razia Melendez on 1/4/2022 7:52 PM      US OB TRANSVAGINAL    (Results Pending)       ED Medications administered this visit:   Medications   acetaminophen (TYLENOL) tablet 1,000 mg (1,000 mg Oral Given 1/4/22 1847)   aluminum & magnesium hydroxide-simethicone (MAALOX) 30 mL, lidocaine viscous hcl (XYLOCAINE) 5 mL (GI COCKTAIL) ( Oral Given 1/4/22 1847)         ED COURSE     ED Course as of 01/04/22 2030 Tue Jan 04, 2022 2011 Patient has remained nonperitoneal and in no acute distress throughout her stay. Beta hCG is approaching the transvaginal discriminatory zone however no intrauterine pregnancy was identified. Significant bedside conversation was discussed and the patient is agreeable to close 48-hour follow-up for repeat hCG and ultrasound. Strict return precautions were given for severe abdominal pain and vaginal bleeding. She is agreeable to this and discharge home. [EM]      ED Course User Index  [EM] David Callahan DO         Strict return precautions and follow up instructions were discussed with the patient prior to discharge, with which the patient agrees. MEDICATION CHANGES     New Prescriptions    No medications on file         FINAL DISPOSITION     Final diagnoses:   Abdominal pain during pregnancy in first trimester     Condition: condition: good  Dispo: Discharge to home      This transcription was electronically signed. Parts of this transcriptions may have been dictated by use of voice recognition software and electronically transcribed, and parts may have been transcribed with the assistance of an ED scribe. The transcription may contain errors not detected in proofreading. Please refer to my supervising physician's documentation if my documentation differs.     Electronically Signed: David Callahan DO, 01/04/22, 8:30 PM       David Callahan DO  Resident  01/04/22 2030

## 2022-01-06 ENCOUNTER — HOSPITAL ENCOUNTER (OUTPATIENT)
Dept: ULTRASOUND IMAGING | Age: 32
Discharge: HOME | End: 2022-01-06
Payer: MEDICARE

## 2022-01-06 ENCOUNTER — HOSPITAL ENCOUNTER (OUTPATIENT)
Age: 32
Discharge: HOME | End: 2022-01-06
Payer: MEDICARE

## 2022-01-06 DIAGNOSIS — O26.891 ABDOMINAL PAIN DURING PREGNANCY IN FIRST TRIMESTER: ICD-10-CM

## 2022-01-06 DIAGNOSIS — R10.9 ABDOMINAL PAIN DURING PREGNANCY IN FIRST TRIMESTER: ICD-10-CM

## 2022-01-06 LAB
HCG,BETA SUBUNIT,QUAL,SERUM: 2418 MIU/ML (ref 0–5)
PROGESTERONE LEVEL: 8.04 NG/ML

## 2022-01-06 PROCEDURE — 84702 CHORIONIC GONADOTROPIN TEST: CPT

## 2022-01-06 PROCEDURE — 84144 ASSAY OF PROGESTERONE: CPT

## 2022-01-06 PROCEDURE — 76817 TRANSVAGINAL US OBSTETRIC: CPT

## 2022-01-06 PROCEDURE — 36415 COLL VENOUS BLD VENIPUNCTURE: CPT

## 2022-01-09 NOTE — PROGRESS NOTES
Called patient to ensure that she received appropriate obstetric follow-up regarding her pregnancy and inconclusive ultrasound and beta-hCG. States that she is being followed and having her levels drawn regularly with repeat ultrasounds. Still having occasional cramping but no severe abdominal pain. Patient successfully being managed as an outpatient by her OB/GYN.

## 2022-01-18 ENCOUNTER — HOSPITAL ENCOUNTER (OUTPATIENT)
Age: 32
Setting detail: SPECIMEN
Discharge: HOME | End: 2022-01-18
Payer: MEDICARE

## 2022-01-18 PROCEDURE — 87636 SARSCOV2 & INF A&B AMP PRB: CPT

## 2022-01-19 LAB
INFLUENZA A: NOT DETECTED
INFLUENZA B: NOT DETECTED
SARS-COV-2 RNA, RT PCR: DETECTED

## 2022-01-19 NOTE — ED NOTES
covid swab obtained and sent to lab.  Tolerated without complaint     Zahraa Burch LPN  37/89/53 [id number]

## 2022-04-13 ENCOUNTER — APPOINTMENT (OUTPATIENT)
Dept: ULTRASOUND IMAGING | Age: 32
End: 2022-04-13
Payer: MEDICARE

## 2022-04-13 ENCOUNTER — HOSPITAL ENCOUNTER (EMERGENCY)
Age: 32
Discharge: HOME | End: 2022-04-13
Payer: MEDICARE

## 2022-04-13 VITALS
SYSTOLIC BLOOD PRESSURE: 153 MMHG | BODY MASS INDEX: 41.64 KG/M2 | OXYGEN SATURATION: 100 % | RESPIRATION RATE: 16 BRPM | HEART RATE: 94 BPM | TEMPERATURE: 98.4 F | WEIGHT: 282 LBS | DIASTOLIC BLOOD PRESSURE: 71 MMHG

## 2022-04-13 DIAGNOSIS — R10.33 ABDOMINAL CRAMPING, PERIUMBILICAL: Primary | ICD-10-CM

## 2022-04-13 DIAGNOSIS — O26.92 COMPLICATION OF PREGNANCY IN SECOND TRIMESTER: ICD-10-CM

## 2022-04-13 LAB
ALBUMIN SERPL-MCNC: 3.8 G/DL (ref 3.5–5.1)
ALP BLD-CCNC: 51 U/L (ref 38–126)
ALT SERPL-CCNC: 8 U/L (ref 11–66)
ANION GAP SERPL CALCULATED.3IONS-SCNC: 12 MEQ/L (ref 8–16)
AST SERPL-CCNC: 11 U/L (ref 5–40)
BACTERIA: NORMAL
BASOPHILS # BLD: 0.1 %
BASOPHILS ABSOLUTE: 0 THOU/MM3 (ref 0–0.1)
BILIRUB SERPL-MCNC: 0.3 MG/DL (ref 0.3–1.2)
BILIRUBIN DIRECT: < 0.2 MG/DL (ref 0–0.3)
BILIRUBIN URINE: NEGATIVE
BLOOD, URINE: NEGATIVE
BUN BLDV-MCNC: 4 MG/DL (ref 7–22)
CALCIUM SERPL-MCNC: 8.4 MG/DL (ref 8.5–10.5)
CASTS: NORMAL /LPF
CASTS: NORMAL /LPF
CHARACTER, URINE: CLEAR
CHLORIDE BLD-SCNC: 103 MEQ/L (ref 98–111)
CO2: 22 MEQ/L (ref 23–33)
COLOR: YELLOW
CREAT SERPL-MCNC: 0.5 MG/DL (ref 0.4–1.2)
CRYSTALS: NORMAL
EOSINOPHIL # BLD: 1 %
EOSINOPHILS ABSOLUTE: 0.1 THOU/MM3 (ref 0–0.4)
EPITHELIAL CELLS, UA: NORMAL /HPF
ERYTHROCYTE [DISTWIDTH] IN BLOOD BY AUTOMATED COUNT: 13.7 % (ref 11.5–14.5)
ERYTHROCYTE [DISTWIDTH] IN BLOOD BY AUTOMATED COUNT: 42.5 FL (ref 35–45)
GFR SERPL CREATININE-BSD FRML MDRD: > 90 ML/MIN/1.73M2
GLUCOSE BLD-MCNC: 97 MG/DL (ref 70–108)
GLUCOSE, URINE: NEGATIVE MG/DL
HCT VFR BLD CALC: 34.2 % (ref 37–47)
HEMOGLOBIN: 11.5 GM/DL (ref 12–16)
IMMATURE GRANS (ABS): 0.04 THOU/MM3 (ref 0–0.07)
IMMATURE GRANULOCYTES: 0.4 %
KETONES, URINE: NEGATIVE
LEUKOCYTE ESTERASE, URINE: NEGATIVE
LIPASE: 11.8 U/L (ref 5.6–51.3)
LYMPHOCYTES # BLD: 8.2 %
LYMPHOCYTES ABSOLUTE: 0.8 THOU/MM3 (ref 1–4.8)
MAGNESIUM: 1.7 MG/DL (ref 1.6–2.4)
MCH RBC QN AUTO: 28.5 PG (ref 26–33)
MCHC RBC AUTO-ENTMCNC: 33.6 GM/DL (ref 32.2–35.5)
MCV RBC AUTO: 84.9 FL (ref 81–99)
MISCELLANEOUS LAB TEST RESULT: NORMAL
MONOCYTES # BLD: 3.1 %
MONOCYTES ABSOLUTE: 0.3 THOU/MM3 (ref 0.4–1.3)
NITRITE, URINE: NEGATIVE
NUCLEATED RED BLOOD CELLS: 0 /100 WBC
OSMOLALITY CALCULATION: 270.6 MOSMOL/KG (ref 275–300)
PH UA: 8.5 (ref 5–9)
PLATELET # BLD: 237 THOU/MM3 (ref 130–400)
PMV BLD AUTO: 11.1 FL (ref 9.4–12.4)
POTASSIUM SERPL-SCNC: 3.5 MEQ/L (ref 3.5–5.2)
PROTEIN UA: NEGATIVE MG/DL
RBC # BLD: 4.03 MILL/MM3 (ref 4.2–5.4)
RBC URINE: NORMAL /HPF
RENAL EPITHELIAL, UA: NORMAL
SEG NEUTROPHILS: 87.2 %
SEGMENTED NEUTROPHILS ABSOLUTE COUNT: 8.8 THOU/MM3 (ref 1.8–7.7)
SODIUM BLD-SCNC: 137 MEQ/L (ref 135–145)
SPECIFIC GRAVITY UA: 1.01 (ref 1–1.03)
TOTAL PROTEIN: 7.2 G/DL (ref 6.1–8)
UROBILINOGEN, URINE: 0.2 EU/DL (ref 0–1)
WBC # BLD: 10.1 THOU/MM3 (ref 4.8–10.8)
WBC UA: NORMAL /HPF
YEAST: NORMAL

## 2022-04-13 PROCEDURE — 81001 URINALYSIS AUTO W/SCOPE: CPT

## 2022-04-13 PROCEDURE — 76815 OB US LIMITED FETUS(S): CPT

## 2022-04-13 PROCEDURE — 36415 COLL VENOUS BLD VENIPUNCTURE: CPT

## 2022-04-13 PROCEDURE — 85025 COMPLETE CBC W/AUTO DIFF WBC: CPT

## 2022-04-13 PROCEDURE — 80053 COMPREHEN METABOLIC PANEL: CPT

## 2022-04-13 PROCEDURE — 6370000000 HC RX 637 (ALT 250 FOR IP): Performed by: NURSE PRACTITIONER

## 2022-04-13 PROCEDURE — 83690 ASSAY OF LIPASE: CPT

## 2022-04-13 PROCEDURE — 99284 EMERGENCY DEPT VISIT MOD MDM: CPT

## 2022-04-13 PROCEDURE — 82248 BILIRUBIN DIRECT: CPT

## 2022-04-13 PROCEDURE — 83735 ASSAY OF MAGNESIUM: CPT

## 2022-04-13 RX ORDER — FAMOTIDINE 20 MG/1
20 TABLET, FILM COATED ORAL ONCE
Status: COMPLETED | OUTPATIENT
Start: 2022-04-13 | End: 2022-04-13

## 2022-04-13 RX ORDER — ACETAMINOPHEN 500 MG
1000 TABLET ORAL ONCE
Status: COMPLETED | OUTPATIENT
Start: 2022-04-13 | End: 2022-04-13

## 2022-04-13 RX ADMIN — FAMOTIDINE 20 MG: 20 TABLET ORAL at 21:11

## 2022-04-13 RX ADMIN — ACETAMINOPHEN 1000 MG: 500 TABLET ORAL at 21:11

## 2022-04-13 ASSESSMENT — PAIN DESCRIPTION - PAIN TYPE: TYPE: ACUTE PAIN

## 2022-04-13 ASSESSMENT — ENCOUNTER SYMPTOMS
COUGH: 0
CONSTIPATION: 0
SHORTNESS OF BREATH: 0
WHEEZING: 0
VOMITING: 0
EYE PAIN: 0
ABDOMINAL PAIN: 1
RHINORRHEA: 0
BLOOD IN STOOL: 0
NAUSEA: 0
DIARRHEA: 0

## 2022-04-13 ASSESSMENT — PAIN SCALES - GENERAL
PAINLEVEL_OUTOF10: 7
PAINLEVEL_OUTOF10: 7

## 2022-04-13 ASSESSMENT — PAIN DESCRIPTION - FREQUENCY: FREQUENCY: CONTINUOUS

## 2022-04-13 ASSESSMENT — PAIN DESCRIPTION - DESCRIPTORS: DESCRIPTORS: CRAMPING

## 2022-04-13 ASSESSMENT — PAIN - FUNCTIONAL ASSESSMENT: PAIN_FUNCTIONAL_ASSESSMENT: 0-10

## 2022-04-13 ASSESSMENT — PAIN DESCRIPTION - ORIENTATION: ORIENTATION: RIGHT;LEFT

## 2022-04-13 ASSESSMENT — PAIN DESCRIPTION - LOCATION: LOCATION: ABDOMEN

## 2022-04-13 NOTE — ED PROVIDER NOTES
[facility] [address] [address] EMERGENCY DEPT  EMERGENCY MEDICINE     Pt Name: Syeda Lyons  MRN: [id number]  [place] 1990  Date of evaluation: 2022  PCP:    ABHAY Norton CNP  Provider: ABHAY Vizcaino CNP    CHIEF COMPLAINT       Chief Complaint   Patient presents with    Abdominal Cramping           HISTORY OF PRESENT ILLNESS    Syeda Lyons is a 27 y.o. female patient that presents to ER with complaint of periumbilical cramping. She states she is about 19 weeks pregnant and last saw her OB at [place] [facility] yesterday. She states that everything \"checked out okay yesterday\". Patient denies bleeding, pelvic pain, chest pain, shortness of breath, dysuria or other urinary symptoms. Patient does not appear ill or toxic. Triage notes and Nursing notes were reviewed by myself. Any discrepancies are addressed above.     PAST MEDICAL HISTORY     Past Medical History:   Diagnosis Date    Asthma     Nicotine dependence        SURGICAL HISTORY       Past Surgical History:   Procedure Laterality Date     SECTION      x3    THYROGLOSSAL DUCT EXCISION N/A 2019    EXCISION OF THYROGLOSSAL DUCT OR SINUS performed by Renetta Mariscal MD at [facility]       Previous Medications    ACETAMINOPHEN (TYLENOL) 500 MG TABLET    Take 1,000 mg by mouth every 6 hours as needed for Pain    ALBUTEROL (ACCUNEB) 0.63 MG/3ML NEBULIZER SOLUTION    Take 3 mLs by nebulization every 6 hours as needed for Wheezing    ALBUTEROL SULFATE HFA (PROVENTIL HFA) 108 (90 BASE) MCG/ACT INHALER    Inhale 2 puffs into the lungs every 4 hours as needed for Wheezing or Shortness of Breath    FLUTICASONE (FLONASE) 50 MCG/ACT NASAL SPRAY    1 spray by Each Nostril route daily    IBUPROFEN (ADVIL;MOTRIN) 800 MG TABLET    Take 1 tablet by mouth every 8 hours as needed for Pain    IPRATROPIUM-ALBUTEROL (DUONEB) 0.5-2.5 (3) MG/3ML SOLN NEBULIZER SOLUTION    Inhale 3 mLs into the lungs every 4 hours as needed for Shortness of Breath    RESPIRATORY THERAPY SUPPLIES (PEDIATRIC COMPRESSOR/NEBULIZER) KIT    1 each by Does not apply route once for 1 dose       ALLERGIES       Allergies   Allergen Reactions    Rocephin [Ceftriaxone] Anaphylaxis    Toradol [Ketorolac Tromethamine] Hives       FAMILY HISTORY       Family History   Problem Relation Age of Onset    Diabetes Mother     High Blood Pressure Mother     Other Father         Sickle Cell Trait    Asthma Sister     Asthma Brother         SOCIAL HISTORY       Social History     Socioeconomic History    Marital status: Single     Spouse name: None    Number of children: None    Years of education: None    Highest education level: None   Occupational History    None   Tobacco Use    Smoking status: Former Smoker     Packs/day: 0.00     Years: 9.00     Pack years: 0.00     Quit date: 2/4/2019     Years since quitting: 3.1    Smokeless tobacco: Never Used   Vaping Use    Vaping Use: Never used   Substance and Sexual Activity    Alcohol use: No    Drug use: No    Sexual activity: Yes     Partners: Male   Other Topics Concern    None   Social History Narrative    None     Social Determinants of Health     Financial Resource Strain:     Difficulty of Paying Living Expenses: Not on file   Food Insecurity:     Worried About Running Out of Food in the Last Year: Not on file    Ebony of Food in the Last Year: Not on file   Transportation Needs:     Lack of Transportation (Medical): Not on file    Lack of Transportation (Non-Medical):  Not on file   Physical Activity:     Days of Exercise per Week: Not on file    Minutes of Exercise per Session: Not on file   Stress:     Feeling of Stress : Not on file   Social Connections:     Frequency of Communication with Friends and Family: Not on file    Frequency of Social Gatherings with Friends and Family: Not on file    Attends Latter-day Services: Not on file   [employer] of Clubs or Organizations: Not on file    Attends Club or Organization Meetings: Not on file    Marital Status: Not on file   Intimate Partner Violence:     Fear of Current or Ex-Partner: Not on file    Emotionally Abused: Not on file    Physically Abused: Not on file    Sexually Abused: Not on file   Housing Stability:     Unable to Pay for Housing in the Last Year: Not on file    Number of [place] in the Last Year: Not on file    Unstable Housing in the Last Year: Not on file       REVIEW OF SYSTEMS     Review of Systems   Constitutional: Negative for appetite change, chills, fatigue, fever and unexpected weight change. HENT: Negative for ear pain and rhinorrhea. Eyes: Negative for pain and visual disturbance. Respiratory: Negative for cough, shortness of breath and wheezing. Cardiovascular: Negative for chest pain, palpitations and leg swelling. Gastrointestinal: Positive for abdominal pain. Negative for blood in stool, constipation, diarrhea, nausea and vomiting. Genitourinary: Negative for dysuria, frequency, hematuria and pelvic pain. Musculoskeletal: Negative for arthralgias, joint swelling and neck stiffness. Skin: Negative for rash. Neurological: Negative for dizziness, syncope, weakness, light-headedness and headaches. Hematological: Does not bruise/bleed easily. Except as noted above the remainder of the review of systems was reviewed and is negative. SCREENINGS        Adriano Coma Scale  Eye Opening: Spontaneous  Best Verbal Response: Oriented  Best Motor Response: Obeys commands  Adriano Coma Scale Score: 15               PHYSICAL EXAM    (up to 7 for level 4, 8 or more for level 5)     ED Triage Vitals [02/19/22 1512]   BP Temp Temp Source Pulse Resp SpO2 Height Weight   (!) 134/95 98.2 °F (36.8 °C) Oral 124 16 100 % -- --       Physical Exam  Vitals and nursing note reviewed. Constitutional:       Appearance: She is well-developed.    HENT:      Head: Normocephalic and atraumatic. Eyes:      Conjunctiva/sclera: Conjunctivae normal.      Pupils: Pupils are equal, round, and reactive to light. Cardiovascular:      Rate and Rhythm: Normal rate and regular rhythm. Heart sounds: Normal heart sounds. No murmur heard. No gallop. Pulmonary:      Effort: Pulmonary effort is normal. No respiratory distress. Breath sounds: Normal breath sounds. No wheezing or rales. Abdominal:      General: Bowel sounds are normal.      Palpations: Abdomen is soft. Tenderness: There is no abdominal tenderness. There is no right CVA tenderness, left CVA tenderness or guarding. Negative signs include Martni's sign and obturator sign. Hernia: No hernia is present. Musculoskeletal:         General: Normal range of motion. Cervical back: Normal range of motion. Skin:     General: Skin is warm and dry. Neurological:      Mental Status: She is alert and oriented to person, place, and time. DIAGNOSTIC RESULTS     EKG:(none if blank)  All EKGs are interpreted by the Emergency Department Physician who either signs or Co-signs this chart in the absence of a cardiologist.        RADIOLOGY: (none if blank)   I directly visualized the following images and reviewed the radiologist interpretations. Interpretation per the Radiologist below, if available at the time of this note:  US OB 1 [facility]   Final Result   1. Single living intrauterine gestation in vertex position estimated at 19    weeks 3 days by today's ultrasound criteria.       This document has been electronically signed by: Lawson Blunt MD on    04/13/2022 07:52 PM          LABS:  Labs Reviewed   CBC WITH AUTO DIFFERENTIAL - Abnormal; Notable for the following components:       Result Value    RBC 4.03 (*)     Hemoglobin 11.5 (*)     Hematocrit 34.2 (*)     Segs Absolute 8.8 (*)     Lymphocytes Absolute 0.8 (*)     Monocytes Absolute 0.3 (*)     All other components within normal limits BASIC METABOLIC PANEL - Abnormal; Notable for the following components:    CO2 22 (*)     BUN 4 (*)     Calcium 8.4 (*)     All other components within normal limits   HEPATIC FUNCTION PANEL - Abnormal; Notable for the following components:    ALT 8 (*)     All other components within normal limits   OSMOLALITY - Abnormal; Notable for the following components:    Osmolality Calc 270.6 (*)     All other components within normal limits   MAGNESIUM   LIPASE   URINALYSIS WITH MICROSCOPIC   ANION GAP   GLOMERULAR FILTRATION RATE, ESTIMATED       All other labs were within normal range or not returned as of this dictation. Please note, any cultures that may have been sent were not resulted at the time of this patient visit. EMERGENCY DEPARTMENT COURSE and Medical Decision Making:     Vitals:    Vitals:    22 1813   BP: (!) 153/71   Pulse: 94   Resp: 16   Temp: 98.4 °F (36.9 °C)   TempSrc: Oral   SpO2: 100%   Weight: 282 lb (127.9 kg)       PROCEDURES: (None if blank)  Procedures    ED Course as of 22 Called and spoke with Dr. Austen Chan on-call for [facility] women's health. Reviewed case with her and she is okay with discharge and patient to follow-up as scheduled unless her pain does not go away then she can call the office and schedule an appointment for sooner. [NW]    Reviewed case with Dr. Mark Escalante. Okay for discharge. [NW]      ED Course User Index  [NW] Davin Cadena, APRN - CNP     MDM  Number of Diagnoses or Management Options    Patient presents to ER with concern for abdominal cramping in the periumbilical area and she is 19 weeks pregnant. Differential diagnosis includes but not limited to  labor, urinary tract infection, round ligament pain or GERD. Labs and ultrasound are reassuring. Call was made to the on-call provider for [facility].   They are okay with discharge with patient to call the office in the morning if still having pain. Patient to follow-up at her regularly scheduled appointment otherwise. Patient return to ER for worsening symptoms, severe abdominal pain or vaginal bleeding. Strict return precautions and follow up instructions were discussed with the patient with which the patient agrees    ED Medications administered this visit:    Medications   acetaminophen (TYLENOL) tablet 1,000 mg (1,000 mg Oral Given 4/13/22 2111)   famotidine (PEPCID) tablet 20 mg (20 mg Oral Given 4/13/22 2111)         FINAL IMPRESSION      1. Abdominal cramping, periumbilical    2. Complication of pregnancy in second trimester          DISPOSITION/PLAN   DISPOSITION Decision To Discharge 04/13/2022 09:27:27 PM      PATIENT REFERRED TO:  [facility] OB      Call in the morning if pain is not better.       DISCHARGE MEDICATIONS:  New Prescriptions    No medications on file              ABHAY Peralta CNP (electronically signed)           ABHAY Peralta CNP  04/13/22 0981

## 2022-05-21 ENCOUNTER — HOSPITAL ENCOUNTER (OUTPATIENT)
Age: 32
Discharge: HOME | End: 2022-05-21
Attending: OBSTETRICS & GYNECOLOGY | Admitting: OBSTETRICS & GYNECOLOGY
Payer: MEDICARE

## 2022-05-21 VITALS
HEIGHT: 69 IN | RESPIRATION RATE: 16 BRPM | BODY MASS INDEX: 41.47 KG/M2 | HEART RATE: 77 BPM | WEIGHT: 280 LBS | TEMPERATURE: 98.1 F | SYSTOLIC BLOOD PRESSURE: 128 MMHG | DIASTOLIC BLOOD PRESSURE: 65 MMHG | OXYGEN SATURATION: 100 %

## 2022-05-21 PROBLEM — R10.9 ABDOMINAL CRAMPING: Status: ACTIVE | Noted: 2022-05-21

## 2022-05-21 LAB
BILIRUBIN URINE: NEGATIVE
BLOOD, URINE: NEGATIVE
CHARACTER, URINE: CLEAR
COLOR: YELLOW
GLUCOSE URINE: NEGATIVE MG/DL
KETONES, URINE: ABNORMAL
LEUKOCYTE ESTERASE, URINE: NEGATIVE
NITRITE, URINE: NEGATIVE
PH UA: 6 (ref 5–9)
PROTEIN UA: NEGATIVE
SPECIFIC GRAVITY, URINE: 1.01 (ref 1–1.03)
UROBILINOGEN, URINE: 0.2 EU/DL (ref 0–1)

## 2022-05-21 PROCEDURE — 81003 URINALYSIS AUTO W/O SCOPE: CPT

## 2022-05-21 PROCEDURE — 2580000003 HC RX 258: Performed by: OBSTETRICS & GYNECOLOGY

## 2022-05-21 PROCEDURE — 87070 CULTURE OTHR SPECIMN AEROBIC: CPT

## 2022-05-21 PROCEDURE — 87205 SMEAR GRAM STAIN: CPT

## 2022-05-21 RX ORDER — ASPIRIN 81 MG/1
81 TABLET, CHEWABLE ORAL DAILY
COMMUNITY
End: 2022-10-23

## 2022-05-21 RX ORDER — SODIUM CHLORIDE, SODIUM LACTATE, POTASSIUM CHLORIDE, AND CALCIUM CHLORIDE .6; .31; .03; .02 G/100ML; G/100ML; G/100ML; G/100ML
1000 INJECTION, SOLUTION INTRAVENOUS ONCE
Status: COMPLETED | OUTPATIENT
Start: 2022-05-21 | End: 2022-05-21

## 2022-05-21 RX ADMIN — SODIUM CHLORIDE, POTASSIUM CHLORIDE, SODIUM LACTATE AND CALCIUM CHLORIDE 1000 ML: 600; 310; 30; 20 INJECTION, SOLUTION INTRAVENOUS at 22:25

## 2022-05-22 NOTE — FLOWSHEET NOTE
Discharge instructions given and reviewed with patient. Informed pt to notify physician or to come back to L&D if leaking fluid from vagina, with or without contractions. Bright red vaginal bleeding occurs that is as heavy or/as heavier period. Regular contractions that are 2-5 mins apart, that are longer stronger and closer together. Decreased FM. Elevated temperature >100.5 degree F and chills. Blurred vision, spots before eyes, unrelieved headache, severe facial swelling or upper abdominal pain. Questions denied, papers signed.

## 2022-05-22 NOTE — FLOWSHEET NOTE
Dr Radha Lopez updated on Staff pt here, @ 24+3 wks  here for intermittent cramping and sharp pain in her back/pelvic area since yesterday. Pt denies any leaking of fluids/vag bleeding. Pt reports light +FM. Pt states she has drank 5-6 bottles of water today and has rested a lot. Pt denies having any sex and no UTI s/s. Pt states she is a scheduled c/section at [facility] with [address] group. T's on doppler 162-170. Pt states pains does not feel like contractions, more a sharp pain that is in her lower pelvis area and up the middle of her abdomen. Pt states she had this pain yesterday and drinking water has help. No contractions have been picked up on EFM. Pt states she has been having some white discharge, no green/yellow discharge noted and no foul smell. See new orders.

## 2022-05-22 NOTE — FLOWSHEET NOTE
Staff pt here, @ 24+3 wks  here for intermittent cramping and sharp pain in her back/pelvic area since yesterday. Pt denies any leaking of fluids/vag bleeding. Pt reports light +FM. Pt states she has drank 5-6 bottles of water today and has rested a lot. Pt denies having any sex and no UTI s/s. Pt states she is a scheduled c/section at [facility] [place] with [address] group. Patient to bathroom to void and change into gown.

## 2022-05-22 NOTE — FLOWSHEET NOTE
Dr Roper updated on urinalysis results, SVE closed, no contractions on EFM, some irritability noted. Pt states pain is still coming and going, but feels better at a 4/10. Dr Roper stated pt may go home at this time.

## 2022-05-22 NOTE — PLAN OF CARE
Problem: Pain  Goal: Verbalizes/displays adequate comfort level or baseline comfort level  Outcome: Progressing  Flowsheets (Taken 5/21/2022 2241)  Verbalizes/displays adequate comfort level or baseline comfort level:   Encourage patient to monitor pain and request assistance   Implement non-pharmacological measures as appropriate and evaluate response   Assess pain using appropriate pain scale  Note: Pt states pain is 6/10 in mid abdomen to lower pelvis. Pts stated pain goal is no pain. Will continue to monitor pain level. Problem: Infection - Adult  Goal: Absence of infection during hospitalization  Outcome: Progressing  Flowsheets (Taken 5/21/2022 2241)  Absence of infection during hospitalization:   Assess and monitor for signs and symptoms of infection   Monitor lab/diagnostic results   Instruct and encourage patient and family to use good hand hygiene technique     Problem: Safety - Adult  Goal: Free from fall injury  Outcome: Progressing  Flowsheets (Taken 5/21/2022 2241)  Free From Fall Injury: Instruct family/caregiver on patient safety  Note: Pt. Remains free from falls at this time. IV infusing per order. RN encouraged pt. To call for assistance to BR. Side rails up X2. Call light within reach. Family At bedside. RN will continue to provide for a safe environment. Problem: Discharge Planning  Goal: Discharge to home or other facility with appropriate resources  Outcome: Progressing  Note: Pt plans to return home upon discharge. Pt denies any needs there     Care plan reviewed with patient. Patient verbalizes understanding of the care plan and contributed to goal setting.

## 2022-05-22 NOTE — PROCEDURES
Department of Obstetrics and Gynecology  Labor and Delivery   Triage Note  Kem Javier D.O.      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 Good FM    OBJECTIVE    Fetal heart rate:         Baseline Heart Rate:  162-170 dopplered    Contraction frequency: nil    The NST was reactive level 1      ASSESSMENT & PLAN:  Discharged to home in a stable condition and instructed to follow up at her next scheduled appointment with her OB. She was recommended to go to [facility] since her provider was there in the future.     Sabas James DO D.O.

## 2022-05-24 LAB
GENITAL CULTURE, ROUTINE: NORMAL
GRAM STAIN RESULT: NORMAL

## 2022-10-22 ENCOUNTER — HOSPITAL ENCOUNTER (EMERGENCY)
Age: 32
Discharge: HOME | End: 2022-10-22
Attending: STUDENT IN AN ORGANIZED HEALTH CARE EDUCATION/TRAINING PROGRAM
Payer: MEDICARE

## 2022-10-22 VITALS
SYSTOLIC BLOOD PRESSURE: 113 MMHG | TEMPERATURE: 98.5 F | DIASTOLIC BLOOD PRESSURE: 92 MMHG | RESPIRATION RATE: 18 BRPM | HEART RATE: 98 BPM | BODY MASS INDEX: 41.35 KG/M2 | OXYGEN SATURATION: 99 % | WEIGHT: 280 LBS

## 2022-10-22 DIAGNOSIS — J06.9 ACUTE UPPER RESPIRATORY INFECTION: Primary | ICD-10-CM

## 2022-10-22 PROCEDURE — 99283 EMERGENCY DEPT VISIT LOW MDM: CPT

## 2022-10-22 ASSESSMENT — ENCOUNTER SYMPTOMS
RHINORRHEA: 1
VOMITING: 0
ABDOMINAL DISTENTION: 0
COUGH: 1
SHORTNESS OF BREATH: 0
CONSTIPATION: 0
WHEEZING: 0
COLOR CHANGE: 0
EYE PAIN: 0
SORE THROAT: 0
DIARRHEA: 0
NAUSEA: 0
EYE DISCHARGE: 0

## 2022-10-22 ASSESSMENT — PAIN - FUNCTIONAL ASSESSMENT: PAIN_FUNCTIONAL_ASSESSMENT: NONE - DENIES PAIN

## 2022-10-22 NOTE — ED NOTES
Patient to ED with complaints of congestion. Patient states she was treated for strep two weeks ago without diagnosis. She states she has allergies but does not take any medications for this. Patient is resting in bed with easy and unlabored respirations. Call light in reach. Side rails up x2. Patient denies further complaints or concerns. Will monitor.         Ruby Slater RN  10/22/22 7077

## 2022-10-22 NOTE — ED PROVIDER NOTES
[address] [address]          Pt Name: William Fay  MRN: [id number]  [place] 1990  Date of evaluation: 10/22/2022  Treating Resident Physician: Glenn Gonzales MD  Supervising Physician: Howie Heard MD     History obtained from the patient. CHIEF COMPLAINT       Chief Complaint   Patient presents with    Congestion           HISTORY OF PRESENT ILLNESS    HPI  William Fay is a 34 y.o. female who presents to the emergency department for evaluation of congestion. Patient has had congestion for the last week. Patient has history of allergies as well and feels that her allergies are worse than usual.  Patient had exposure to coccal pharyngitis approximately 2-week ago and had a 5-day course of azithromycin. Patient currently has other sick family members including her 2 daughters. Patient has associated cough. Patient denies any decrease in p.o. intake. Patient denies any fever or shortness of breath. The patient has no other acute complaints at this time. REVIEW OF SYSTEMS   Review of Systems   Constitutional:  Negative for fatigue and fever. HENT:  Positive for rhinorrhea. Negative for ear pain and sore throat. Eyes:  Negative for pain and discharge. Respiratory:  Positive for cough. Negative for shortness of breath and wheezing. Cardiovascular:  Negative for chest pain, palpitations and leg swelling. Gastrointestinal:  Negative for abdominal distention, constipation, diarrhea, nausea and vomiting. Endocrine: Negative for polydipsia and polyuria. Genitourinary:  Negative for difficulty urinating and dysuria. Musculoskeletal:  Negative for arthralgias. Skin:  Negative for color change, pallor and rash. Neurological:  Negative for dizziness, seizures, syncope, weakness and numbness. Psychiatric/Behavioral:  Negative for agitation and confusion.         PAST MEDICAL AND SURGICAL HISTORY     Past Medical History:   Diagnosis Date    Anxiety disorder     history of anxiety, COVID related was on meds, not on meds anymore    Asthma     PRN inhaler    Nicotine dependence      Past Surgical History:   Procedure Laterality Date     SECTION      x3    THYROGLOSSAL DUCT EXCISION N/A 2019    EXCISION OF THYROGLOSSAL DUCT OR SINUS performed by Luis Cruz MD at [address]   No current facility-administered medications for this encounter.     Current Outpatient Medications:     budesonide (RHINOCORT ALLERGY) 32 MCG/ACT nasal spray, 1 spray by Each Nostril route daily, Disp: 1 each, Rfl: 0    aspirin 81 MG chewable tablet, Take 81 mg by mouth daily, Disp: , Rfl:     ipratropium-albuterol (DUONEB) 0.5-2.5 (3) MG/3ML SOLN nebulizer solution, Inhale 3 mLs into the lungs every 4 hours as needed for Shortness of Breath, Disp: 240 mL, Rfl: 0    acetaminophen (TYLENOL) 500 MG tablet, Take 1,000 mg by mouth every 6 hours as needed for Pain, Disp: , Rfl:     albuterol sulfate HFA (PROVENTIL HFA) 108 (90 Base) MCG/ACT inhaler, Inhale 2 puffs into the lungs every 4 hours as needed for Wheezing or Shortness of Breath, Disp: 18 g, Rfl: 0    albuterol (ACCUNEB) 0.63 MG/3ML nebulizer solution, Take 3 mLs by nebulization every 6 hours as needed for Wheezing, Disp: 270 mL, Rfl: 0    Respiratory Therapy Supplies (PEDIATRIC COMPRESSOR/NEBULIZER) KIT, 1 each by Does not apply route once for 1 dose, Disp: 1 kit, Rfl: 0    fluticasone (FLONASE) 50 MCG/ACT nasal spray, 1 spray by Each Nostril route daily, Disp: 1 Bottle, Rfl: 0    ibuprofen (ADVIL;MOTRIN) 800 MG tablet, Take 1 tablet by mouth every 8 hours as needed for Pain, Disp: 30 tablet, Rfl: 0      SOCIAL HISTORY     Social History     Social History Narrative    Not on file     Social History     Tobacco Use    Smoking status: Former     Packs/day: 0.00     Years: 9.00     Pack years: 0.00     Types: Cigarettes     Quit date: 2019     Years since quitting: 3.7    Smokeless tobacco: Never   Vaping Use    Vaping Use: Never used   Substance Use Topics    Alcohol use: No    Drug use: No         ALLERGIES     Allergies   Allergen Reactions    Rocephin [Ceftriaxone] Anaphylaxis    Toradol [Ketorolac Tromethamine] Hives         FAMILY HISTORY     Family History   Problem Relation Age of Onset    Diabetes Mother     High Blood Pressure Mother     Other Father         Sickle Cell Trait    Asthma Sister     Asthma Brother          PREVIOUS RECORDS   Previous records reviewed:  4/13/2022 through current . PHYSICAL EXAM     ED Triage Vitals [10/22/22 1552]   BP Temp Temp Source Heart Rate Resp SpO2 Height Weight   (!) 113/92 98.5 °F (36.9 °C) Oral 98 18 99 % -- 280 lb (127 kg)     Initial vital signs and nursing assessment reviewed and normal. Body mass index is 41.35 kg/m². Pulsoximetry is normal per my interpretation. Additional Vital Signs:  Vitals:    10/22/22 1552   BP: (!) 113/92   Pulse: 98   Resp: 18   Temp: 98.5 °F (36.9 °C)   SpO2: 99%       Physical Exam  Constitutional:       Appearance: Normal appearance. HENT:      Head: Normocephalic. Right Ear: External ear normal.      Left Ear: External ear normal.      Nose: Congestion present. Mouth/Throat:      Mouth: Mucous membranes are moist.      Pharynx: Oropharynx is clear. Eyes:      Extraocular Movements: Extraocular movements intact. Conjunctiva/sclera: Conjunctivae normal.      Pupils: Pupils are equal, round, and reactive to light. Cardiovascular:      Rate and Rhythm: Normal rate and regular rhythm. Pulses: Normal pulses. Heart sounds: Normal heart sounds. Pulmonary:      Effort: Pulmonary effort is normal.      Breath sounds: Normal breath sounds. Abdominal:      General: Bowel sounds are normal.      Palpations: Abdomen is soft. Musculoskeletal:         General: Normal range of motion. Cervical back: Normal range of motion and neck supple.    Skin: General: Skin is warm and dry. Capillary Refill: Capillary refill takes less than 2 seconds. Neurological:      General: No focal deficit present. Mental Status: She is alert and oriented to person, place, and time. Psychiatric:         Mood and Affect: Mood normal.         Behavior: Behavior normal.           MEDICAL DECISION MAKING   Initial Assessment:   Patient is a 58-year-old female with past medical history of asthma and thyroglossal duct cyst with complaint of nasal congestion. Patient differential diagnosis includes but is not limited to URI, asthma, viral illness, pneumonia, seasonal allergies, bronchitis. Based on history and physical exam patient will be diagnosed with URI today. Plan:  Rhinocort prescription   Discharge home with precautions instructed to follow-up with PCP          ED RESULTS   Laboratory results:  Labs Reviewed - No data to display    Radiologic studies results:  No orders to display       ED Medications administered this visit: Medications - No data to display      ED COURSE         Strict return precautions and follow up instructions were discussed with the patient prior to discharge, with which the patient agrees. MEDICATION CHANGES     Discharge Medication List as of 10/22/2022  4:34 PM        START taking these medications    Details   budesonide (RHINOCORT ALLERGY) 32 MCG/ACT nasal spray 1 spray by Each Nostril route daily, Disp-1 each, R-0Dispense 5 ml containerNormal               FINAL DISPOSITION     Final diagnoses:   Acute upper respiratory infection     Condition: condition: good  Dispo: Discharge to home      This transcription was electronically signed. Parts of this transcriptions may have been dictated by use of voice recognition software and electronically transcribed, and parts may have been transcribed with the assistance of an ED scribe. The transcription may contain errors not detected in proofreading.   Please refer to my supervising physician's documentation if my documentation differs.     Electronically Signed: Mo Huffman MD, 10/22/22, 6:27 PM        Mo Huffman MD  Resident  10/22/22 4172

## 2022-10-22 NOTE — ED PROVIDER NOTES
[address] [facility] [address] [address] EMERGENCY DEPT  ED Attending Physician Attestation     I performed a history and physical examination of the patient and discussed management with the Resident. I reviewed the Resident's note and agree with the documented findings and plan of care. Any areas of disagreement are noted on the chart. I was personally present for the key portions of any procedures and have documented in the chart those procedures where I was not present during the key portions. I have reviewed the emergency nurses triage note and agree with the chief complaint, past medical history, past surgical history, allergies, medications, social and family history as documented unless otherwise noted below. Presentation consistent with a viral upper respiratory infection. The patient has normal vital signs. Lungs are clear to auscultation bilaterally. Nasal congestion otherwise unremarkable physical examination. Supportive care with Tylenol and Motrin discussed. Strict return precautions discussed.   PCP follow-up    Ese Marquez MD,  Attending Emergency Physician       Ese Marquez MD  10/22/22 1640

## 2022-10-23 ENCOUNTER — HOSPITAL ENCOUNTER (EMERGENCY)
Age: 32
Discharge: HOME | End: 2022-10-23
Payer: MEDICARE

## 2022-10-23 VITALS
BODY MASS INDEX: 38.51 KG/M2 | WEIGHT: 260 LBS | OXYGEN SATURATION: 96 % | HEIGHT: 69 IN | HEART RATE: 83 BPM | RESPIRATION RATE: 18 BRPM | SYSTOLIC BLOOD PRESSURE: 136 MMHG | DIASTOLIC BLOOD PRESSURE: 69 MMHG | TEMPERATURE: 98.7 F

## 2022-10-23 DIAGNOSIS — J45.909 ASTHMA WITH BRONCHITIS: Primary | ICD-10-CM

## 2022-10-23 PROCEDURE — 99213 OFFICE O/P EST LOW 20 MIN: CPT | Performed by: EMERGENCY MEDICINE

## 2022-10-23 PROCEDURE — 99213 OFFICE O/P EST LOW 20 MIN: CPT

## 2022-10-23 RX ORDER — ALBUTEROL SULFATE 2.5 MG/3ML
2.5 SOLUTION RESPIRATORY (INHALATION) EVERY 6 HOURS PRN
Qty: 60 EACH | Refills: 0 | Status: SHIPPED | OUTPATIENT
Start: 2022-10-23

## 2022-10-23 RX ORDER — ASPIRIN 325 MG
325 TABLET ORAL DAILY
COMMUNITY

## 2022-10-23 RX ORDER — PREDNISONE 20 MG/1
40 TABLET ORAL DAILY
Qty: 10 TABLET | Refills: 0 | Status: SHIPPED | OUTPATIENT
Start: 2022-10-23 | End: 2022-10-28

## 2022-10-23 ASSESSMENT — ENCOUNTER SYMPTOMS
WHEEZING: 1
CHEST TIGHTNESS: 1
COUGH: 1
SHORTNESS OF BREATH: 1
RHINORRHEA: 1
ABDOMINAL PAIN: 0

## 2022-10-23 ASSESSMENT — PAIN - FUNCTIONAL ASSESSMENT: PAIN_FUNCTIONAL_ASSESSMENT: NONE - DENIES PAIN

## 2022-10-23 NOTE — DISCHARGE INSTRUCTIONS
Prednisone as directed    Drink plenty of fluids  Albuterol nebulizer treatments as prescribed as needed    Return for new or worsening symptoms

## 2022-10-23 NOTE — ED TRIAGE NOTES
Patient states treated for strep throat last wk. RX. [email or web address].cough, congestion started  4 days ago, HX. Asthma, SOB with activity, albuterol neb. Today. Helped a little. Seen [facility] ED last night for chest congestion. No medications given.

## 2022-10-23 NOTE — ED PROVIDER NOTES
[facility] 36  Urgent Care Encounter       CHIEF COMPLAINT       Chief Complaint   Patient presents with    Cough     Congestion, HX. asthma       Nurses Notes reviewed and I agree except as noted in the HPI. HISTORY OF PRESENT ILLNESS   Darrell Pedersen is a 34 y.o. female who presents for complaints of cough, wheezing, chest tightness. Symptoms for 3 to 4 days. Patient was in the emergency department yesterday and evaluated. She was discharged without medications. She states her wheezing has worsened. She has had an increase in chest tightness    Patient states she had a child with strep pharyngitis 1 week ago. She was treated with amoxicillin last week and is currently completed with the antibiotic. No fevers. Mild shortness of breath. Chest tightness but no chest pain. HPI    REVIEW OF SYSTEMS     Review of Systems   Constitutional:  Negative for activity change, fatigue and fever. HENT:  Positive for rhinorrhea. Negative for congestion. Respiratory:  Positive for cough, chest tightness, shortness of breath and wheezing. Cardiovascular:  Negative for chest pain. Gastrointestinal:  Negative for abdominal pain. Neurological:  Negative for dizziness and headaches. Psychiatric/Behavioral:  Negative for behavioral problems. PAST MEDICAL HISTORY         Diagnosis Date    Anxiety disorder     history of anxiety, COVID related was on meds, not on meds anymore    Asthma     PRN inhaler    Nicotine dependence        SURGICALHISTORY     Patient  has a past surgical history that includes  section and thyroglossal duct excision (N/A, 2019).     CURRENT MEDICATIONS       Previous Medications    ACETAMINOPHEN (TYLENOL) 500 MG TABLET    Take 1,000 mg by mouth every 6 hours as needed for Pain    ALBUTEROL SULFATE HFA (PROVENTIL HFA) 108 (90 BASE) MCG/ACT INHALER    Inhale 2 puffs into the lungs every 4 hours as needed for Wheezing or Shortness of Breath present. Skin:     General: Skin is warm and dry. Capillary Refill: Capillary refill takes less than 2 seconds. Neurological:      General: No focal deficit present. Mental Status: She is alert. Psychiatric:         Mood and Affect: Mood normal.         Behavior: Behavior normal.       DIAGNOSTIC RESULTS     Labs:No results found for this visit on 10/23/22. IMAGING:    No orders to display         EKG:      URGENT CARE COURSE:     Vitals:    10/23/22 1206   BP: 136/69   Pulse: 83   Resp: 18   Temp: 98.7 °F (37.1 °C)   TempSrc: Temporal   SpO2: 96%   Weight: 260 lb (117.9 kg)   Height: 5' 9\" (1.753 m)       Medications - No data to display         PROCEDURES:  None    FINAL IMPRESSION      1. Asthma with bronchitis          DISPOSITION/ PLAN     Patient presents for what is likely asthmatic bronchitis. She has expiratory wheezes throughout. We will treat with prednisone, will refill her albuterol nebulizers as she is nearly out. She will use her albuterol inhaler as needed. Follow-up with her primary care provider for further management. Return for new or worsening symptoms.     PATIENT REFERRED TO:  ABHAY Hill CNP  [address] [address]      DISCHARGE MEDICATIONS:  New Prescriptions    ALBUTEROL (PROVENTIL) (2.5 MG/3ML) 0.083% NEBULIZER SOLUTION    Take 3 mLs by nebulization every 6 hours as needed for Wheezing    PREDNISONE (DELTASONE) 20 MG TABLET    Take 2 tablets by mouth daily for 5 days       Discontinued Medications    ALBUTEROL (ACCUNEB) 0.63 MG/3ML NEBULIZER SOLUTION    Take 3 mLs by nebulization every 6 hours as needed for Wheezing    ASPIRIN 81 MG CHEWABLE TABLET    Take 81 mg by mouth daily       Current Discharge Medication List          Ila Leicester, APRN - CNP    (Please note that portions of this note were completed with a voice recognition program. Efforts were made to edit the dictations but occasionally words are mis-transcribed.)           Brayan Lin, APRN - CNP  10/23/22 3722

## 2022-12-07 ENCOUNTER — HOSPITAL ENCOUNTER (EMERGENCY)
Age: 32
Discharge: HOME | End: 2022-12-07
Payer: MEDICARE

## 2022-12-07 VITALS
OXYGEN SATURATION: 98 % | TEMPERATURE: 97.7 F | HEART RATE: 69 BPM | DIASTOLIC BLOOD PRESSURE: 63 MMHG | RESPIRATION RATE: 16 BRPM | SYSTOLIC BLOOD PRESSURE: 135 MMHG

## 2022-12-07 DIAGNOSIS — R10.2 PELVIC PAIN: ICD-10-CM

## 2022-12-07 DIAGNOSIS — N93.9 VAGINAL BLEEDING: Primary | ICD-10-CM

## 2022-12-07 LAB
BILIRUBIN URINE: NEGATIVE
BLOOD, URINE: ABNORMAL
CHARACTER, URINE: CLEAR
COLOR: YELLOW
GLUCOSE URINE: NEGATIVE MG/DL
KETONES, URINE: NEGATIVE
LEUKOCYTE ESTERASE, URINE: NEGATIVE
NITRITE, URINE: NEGATIVE
PH UA: 6 (ref 5–9)
PROTEIN UA: NEGATIVE MG/DL
SPECIFIC GRAVITY UA: 1.02 (ref 1–1.03)
UROBILINOGEN, URINE: 0.2 EU/DL (ref 0.2–1)

## 2022-12-07 PROCEDURE — 99213 OFFICE O/P EST LOW 20 MIN: CPT | Performed by: NURSE PRACTITIONER

## 2022-12-07 PROCEDURE — 99213 OFFICE O/P EST LOW 20 MIN: CPT

## 2022-12-07 PROCEDURE — 81003 URINALYSIS AUTO W/O SCOPE: CPT

## 2022-12-07 ASSESSMENT — ENCOUNTER SYMPTOMS
NAUSEA: 0
TROUBLE SWALLOWING: 0
VOMITING: 0
ABDOMINAL PAIN: 0
BACK PAIN: 0
SHORTNESS OF BREATH: 0

## 2022-12-07 ASSESSMENT — PAIN - FUNCTIONAL ASSESSMENT: PAIN_FUNCTIONAL_ASSESSMENT: 0-10

## 2022-12-07 ASSESSMENT — PAIN DESCRIPTION - LOCATION: LOCATION: BACK;PELVIS

## 2022-12-07 ASSESSMENT — PAIN DESCRIPTION - DESCRIPTORS: DESCRIPTORS: ACHING

## 2022-12-07 ASSESSMENT — PAIN SCALES - GENERAL: PAINLEVEL_OUTOF10: 6

## 2022-12-07 NOTE — ED TRIAGE NOTES
To room with c/o vaginal spotting/ bleeding for more than 3 weeks shortly after she had a  Nexplanon implant. She was seen by GYN Thursday who did mult tests and scheduled removal. Sore throat, fatigue, and headache started 2 days ago. Cloudy urine, flank / pelvic pain, and intermittent dizziness.

## 2022-12-07 NOTE — ED PROVIDER NOTES
[place] [address]  Urgent Care Encounter      CHIEF COMPLAINT       Chief Complaint   Patient presents with    Vaginal Bleeding    Fatigue    Pelvic Pain       Nurses Notes reviewed and I agree except as noted in the HPI. HISTORY OF PRESENT ILLNESS   Clement Calix is a 34 y.o. female who presents for evaluation of pelvic pain/vaginal bleeding. Onset of symptoms after having an Nexplanon implanted. Patient was evaluated by OB/GYN last week. Patient complains of worsened pelvic pain and bleeding. Pain is cramping, continuous. Rates it 6/10. No fever. She has also noted cloudy urine. No dysuria, vaginal discharge. No STD exposure. No treatment prior to arrival.    REVIEW OF SYSTEMS     Review of Systems   Constitutional:  Negative for fever. HENT:  Negative for trouble swallowing. Respiratory:  Negative for shortness of breath. Cardiovascular:  Negative for chest pain. Gastrointestinal:  Negative for abdominal pain, nausea and vomiting. Genitourinary:  Positive for pelvic pain and vaginal bleeding. Negative for decreased urine volume, difficulty urinating, dysuria, flank pain, frequency, genital sores, hematuria, menstrual problem, urgency, vaginal discharge and vaginal pain. Musculoskeletal:  Negative for back pain, neck pain and neck stiffness. Skin:  Negative for rash. Neurological:  Negative for headaches. Hematological:  Negative for adenopathy. Psychiatric/Behavioral:  Negative for sleep disturbance. PAST MEDICAL HISTORY         Diagnosis Date    Anxiety disorder     history of anxiety, COVID related was on meds, not on meds anymore    Asthma     PRN inhaler    Nicotine dependence        SURGICAL HISTORY     Patient  has a past surgical history that includes  section and thyroglossal duct excision (N/A, 2019).     CURRENT MEDICATIONS       Discharge Medication List as of 2022 10:59 AM        CONTINUE these medications which have NOT CHANGED    Details   albuterol (PROVENTIL) (2.5 MG/3ML) 0.083% nebulizer solution Take 3 mLs by nebulization every 6 hours as needed for Wheezing, Disp-60 each, R-0Normal      ipratropium-albuterol (DUONEB) 0.5-2.5 (3) MG/3ML SOLN nebulizer solution Inhale 3 mLs into the lungs every 4 hours as needed for Shortness of Breath, Disp-240 mL, R-0Normal      acetaminophen (TYLENOL) 500 MG tablet Take 1,000 mg by mouth every 6 hours as needed for PainHistorical Med      albuterol sulfate HFA (PROVENTIL HFA) 108 (90 Base) MCG/ACT inhaler Inhale 2 puffs into the lungs every 4 hours as needed for Wheezing or Shortness of Breath, Disp-18 g, R-0Normal      Respiratory Therapy Supplies (PEDIATRIC COMPRESSOR/NEBULIZER) KIT 1 each by Does not apply route once for 1 dose, Disp-1 kit,R-0Print      ibuprofen (ADVIL;MOTRIN) 800 MG tablet Take 1 tablet by mouth every 8 hours as needed for Pain, Disp-30 tablet, R-0Print             ALLERGIES     Patient is is allergic to rocephin [ceftriaxone] and toradol [ketorolac tromethamine]. FAMILY HISTORY     Patient'sfamily history includes Asthma in her brother and sister; Diabetes in her mother; High Blood Pressure in her mother; Other in her father. SOCIAL HISTORY     Patient  reports that she quit smoking about 3 years ago. She has never used smokeless tobacco. She reports that she does not drink alcohol and does not use drugs. PHYSICAL EXAM     ED TRIAGE VITALS  BP: 135/63, Temp: 97.7 °F (36.5 °C), Heart Rate: 69, Resp: 16, SpO2: 98 %  Physical Exam  Vitals and nursing note reviewed. Constitutional:       General: She is not in acute distress. Appearance: Normal appearance. She is well-developed. She is not ill-appearing, toxic-appearing or diaphoretic. HENT:      Head: Normocephalic and atraumatic. Right Ear: Hearing, tympanic membrane, ear canal and external ear normal. No mastoid tenderness. No hemotympanum.  Tympanic membrane is not perforated, erythematous or bulging. Left Ear: Hearing, tympanic membrane, ear canal and external ear normal. No mastoid tenderness. No hemotympanum. Tympanic membrane is not perforated, erythematous or bulging. Nose: Nose normal.      Mouth/Throat:      Mouth: Mucous membranes are moist.      Pharynx: Oropharynx is clear. Uvula midline. Tonsils: No tonsillar abscesses. Eyes:      General: No scleral icterus. Conjunctiva/sclera: Conjunctivae normal.      Right eye: Right conjunctiva is not injected. No hemorrhage. Left eye: Left conjunctiva is not injected. No hemorrhage. Neck:      Thyroid: No thyromegaly. Trachea: Trachea normal.   Cardiovascular:      Rate and Rhythm: Normal rate and regular rhythm. No extrasystoles are present. Chest Wall: PMI is not displaced. Heart sounds: Normal heart sounds. No murmur heard. No friction rub. No gallop. Pulmonary:      Effort: Pulmonary effort is normal. No respiratory distress. Breath sounds: Normal breath sounds. Abdominal:      General: There is no distension. Musculoskeletal:      Cervical back: Normal range of motion and neck supple. Lymphadenopathy:      Head:      Right side of head: No submental, submandibular, tonsillar or occipital adenopathy. Left side of head: No submental, submandibular, tonsillar or occipital adenopathy. Cervical: No cervical adenopathy. Upper Body:      Right upper body: No supraclavicular adenopathy. Left upper body: No supraclavicular adenopathy. Skin:     General: Skin is warm and dry. Capillary Refill: Capillary refill takes less than 2 seconds. Coloration: Skin is not pale. Findings: No rash. Comments: Skin warm and dry to touch, no rashes noted on exposed surfaces. Neurological:      Mental Status: She is alert and oriented to person, place, and time. She is not disoriented.    Psychiatric:         Mood and Affect: Mood normal.         Behavior: Behavior is cooperative. DIAGNOSTIC RESULTS   Labs:  Results for orders placed or performed during the hospital encounter of 12/07/22   Urinalysis   Result Value Ref Range    Glucose, Ur Negative NEGATIVE mg/dl    Bilirubin Urine Negative NEGATIVE    Ketones, Urine Negative NEGATIVE    Specific Gravity, UA 1.020 1.002 - 1.030    Blood, Urine Moderate (A) NEGATIVE    pH, UA 6.00 5.0 - 9.0    Protein, UA Negative NEGATIVE mg/dl    Urobilinogen, Urine 0.20 0.2 - 1.0 eu/dl    Nitrite, Urine Negative NEGATIVE    Leukocyte Esterase, Urine Negative NEGATIVE    Color, UA Yellow STRAW-YELLOW    Character, Urine Clear CLEAR-SL CLOUD       IMAGING:  No orders to display      URGENT CARE COURSE:     Vitals:    12/07/22 1034   BP: 135/63   Pulse: 69   Resp: 16   Temp: 97.7 °F (36.5 °C)   TempSrc: Temporal   SpO2: 98%       Medications - No data to display  PROCEDURES:  None  FINAL IMPRESSION      1. Vaginal bleeding    2. Pelvic pain        DISPOSITION/PLAN   DISPOSITION Decision To Discharge 12/07/2022 10:58:29 AM    UA negative for infection. Patient presents with worsening vaginal bleeding/pelvic pain. Patient has appointment next week with OB/GYN to remove her Nexplanon. Advised to call OB/GYN today for evaluation and treatment. Possible ultrasound. If any distress go to ER. PATIENT REFERRED TO:  ABHAY Lemos CNP  [address] [facility]  [address]  [address] [facility]  [phone]      Follow-up with PCP as needed. Call OB/GYN for follow-up/possible ultrasound. Continue increased hydration. Over-the-counter medicine as needed for pain. If any distress go to ER.     DISCHARGE MEDICATIONS:  Discharge Medication List as of 12/7/2022 10:59 AM        Discharge Medication List as of 12/7/2022 10:59 AM          [address] [facility], APRN - CNP  12/07/22 1213

## 2022-12-09 ENCOUNTER — HOSPITAL ENCOUNTER (EMERGENCY)
Age: 32
Discharge: HOME | End: 2022-12-10
Payer: MEDICARE

## 2022-12-09 VITALS
RESPIRATION RATE: 16 BRPM | OXYGEN SATURATION: 99 % | SYSTOLIC BLOOD PRESSURE: 144 MMHG | DIASTOLIC BLOOD PRESSURE: 97 MMHG | TEMPERATURE: 98 F | HEART RATE: 67 BPM

## 2022-12-09 DIAGNOSIS — R10.2 PELVIC PAIN: Primary | ICD-10-CM

## 2022-12-09 LAB
ANION GAP SERPL CALCULATED.3IONS-SCNC: 10 MEQ/L (ref 8–16)
BASOPHILS # BLD: 0.3 %
BASOPHILS ABSOLUTE: 0 THOU/MM3 (ref 0–0.1)
BUN BLDV-MCNC: 11 MG/DL (ref 7–22)
CALCIUM SERPL-MCNC: 8.7 MG/DL (ref 8.5–10.5)
CHLORIDE BLD-SCNC: 104 MEQ/L (ref 98–111)
CO2: 25 MEQ/L (ref 23–33)
CREAT SERPL-MCNC: 0.8 MG/DL (ref 0.4–1.2)
EOSINOPHIL # BLD: 2.8 %
EOSINOPHILS ABSOLUTE: 0.3 THOU/MM3 (ref 0–0.4)
ERYTHROCYTE [DISTWIDTH] IN BLOOD BY AUTOMATED COUNT: 15.5 % (ref 11.5–14.5)
ERYTHROCYTE [DISTWIDTH] IN BLOOD BY AUTOMATED COUNT: 46.5 FL (ref 35–45)
GFR SERPL CREATININE-BSD FRML MDRD: > 60 ML/MIN/1.73M2
GLUCOSE BLD-MCNC: 80 MG/DL (ref 70–108)
HCT VFR BLD CALC: 39.4 % (ref 37–47)
HEMOGLOBIN: 12.8 GM/DL (ref 12–16)
IMMATURE GRANS (ABS): 0.03 THOU/MM3 (ref 0–0.07)
IMMATURE GRANULOCYTES: 0.3 %
LYMPHOCYTES # BLD: 26.3 %
LYMPHOCYTES ABSOLUTE: 2.9 THOU/MM3 (ref 1–4.8)
MCH RBC QN AUTO: 26.8 PG (ref 26–33)
MCHC RBC AUTO-ENTMCNC: 32.5 GM/DL (ref 32.2–35.5)
MCV RBC AUTO: 82.6 FL (ref 81–99)
MONOCYTES # BLD: 5 %
MONOCYTES ABSOLUTE: 0.6 THOU/MM3 (ref 0.4–1.3)
NUCLEATED RED BLOOD CELLS: 0 /100 WBC
OSMOLALITY CALCULATION: 275.9 MOSMOL/KG (ref 275–300)
PLATELET # BLD: 274 THOU/MM3 (ref 130–400)
PMV BLD AUTO: 10.6 FL (ref 9.4–12.4)
POTASSIUM SERPL-SCNC: 3.8 MEQ/L (ref 3.5–5.2)
PREGNANCY, SERUM: NEGATIVE
RBC # BLD: 4.77 MILL/MM3 (ref 4.2–5.4)
SEG NEUTROPHILS: 65.3 %
SEGMENTED NEUTROPHILS ABSOLUTE COUNT: 7.2 THOU/MM3 (ref 1.8–7.7)
SODIUM BLD-SCNC: 139 MEQ/L (ref 135–145)
WBC # BLD: 11 THOU/MM3 (ref 4.8–10.8)

## 2022-12-09 PROCEDURE — 99284 EMERGENCY DEPT VISIT MOD MDM: CPT

## 2022-12-09 PROCEDURE — 84703 CHORIONIC GONADOTROPIN ASSAY: CPT

## 2022-12-09 PROCEDURE — 87070 CULTURE OTHR SPECIMN AEROBIC: CPT

## 2022-12-09 PROCEDURE — 36415 COLL VENOUS BLD VENIPUNCTURE: CPT

## 2022-12-09 PROCEDURE — 85025 COMPLETE CBC W/AUTO DIFF WBC: CPT

## 2022-12-09 PROCEDURE — 80048 BASIC METABOLIC PNL TOTAL CA: CPT

## 2022-12-09 PROCEDURE — 87880 STREP A ASSAY W/OPTIC: CPT

## 2022-12-09 PROCEDURE — 87636 SARSCOV2 & INF A&B AMP PRB: CPT

## 2022-12-09 ASSESSMENT — ENCOUNTER SYMPTOMS
COUGH: 0
ABDOMINAL PAIN: 0
EYE ITCHING: 0
SORE THROAT: 0
NAUSEA: 0
SINUS PAIN: 0
EYE PAIN: 0
DIARRHEA: 0
CHEST TIGHTNESS: 0
RHINORRHEA: 0
SHORTNESS OF BREATH: 0
EYE DISCHARGE: 0
VOMITING: 0
BACK PAIN: 0
FACIAL SWELLING: 0

## 2022-12-10 ENCOUNTER — APPOINTMENT (OUTPATIENT)
Dept: ULTRASOUND IMAGING | Age: 32
End: 2022-12-10
Payer: MEDICARE

## 2022-12-10 LAB
GROUP A STREP CULTURE, REFLEX: NEGATIVE
INFLUENZA A: NOT DETECTED
INFLUENZA B: NOT DETECTED
REFLEX THROAT C + S: NORMAL
SARS-COV-2 RNA, RT PCR: NOT DETECTED

## 2022-12-10 PROCEDURE — 76830 TRANSVAGINAL US NON-OB: CPT

## 2022-12-10 PROCEDURE — 93975 VASCULAR STUDY: CPT

## 2022-12-10 NOTE — ED PROVIDER NOTES
[address] [facility] [address] [address] EMERGENCY DEPT  [address] [facility] [address]  Phone: [phone]        CHIEF COMPLAINT       Chief Complaint   Patient presents with    Vaginal Bleeding    Pelvic Pain       Nurses Notes reviewed and I agree except as notedin the HPI. HISTORY OF PRESENT ILLNESS    Jude Cartagena is a 34 y.o. female who presents with vaginal bleeding and increased pelvic cramping. The patient states that she had the Nexplanon birth control implanted in October of this year and has since had increased irritation and pelvic cramping that radiates to her lower back. Over the past three weeks the patient has had continuous spotting to which she had seen her Gynecologist for who had informed her that it was believed to be her menses. The patients spotting continued so she had taken herself to an Urgent care where they were concerned for possible cysts and informed her to come to [facility] to have an Ultrasound completed. The patient denies any additional symptoms besides a recent sore throat for the past few days. The patient denies any history of blood clots or clotting disorders but states she does have a history of  ovarian cysts and fibroids. Location/Symptom: Pelvis to lower back, vaginal spotting  Timing/Onset: 3 weeks  Context/Setting: Patient   Quality: cramping  Duration: continuous  Modifying Factors: Nexplanon place October  Severity: pelvic pain 5/10  REVIEW OF SYSTEMS     Review of Systems   Constitutional:  Negative for chills, diaphoresis, fatigue and fever. HENT:  Negative for congestion, facial swelling, rhinorrhea, sinus pain and sore throat. Eyes:  Negative for pain, discharge, itching and visual disturbance. Respiratory:  Negative for cough, chest tightness and shortness of breath. Gastrointestinal:  Negative for abdominal pain, diarrhea, nausea and vomiting. Endocrine: Negative for cold intolerance and heat intolerance.    Genitourinary:  Positive for pelvic pain and vaginal bleeding. Negative for difficulty urinating, dysuria, flank pain and vaginal pain. Musculoskeletal:  Negative for back pain, neck pain and neck stiffness. Neurological:  Negative for dizziness, weakness, light-headedness, numbness and headaches. Psychiatric/Behavioral:  Negative for agitation, behavioral problems and confusion. The patient is not nervous/anxious. PAST MEDICAL HISTORY    has a past medical history of Anxiety disorder, Asthma, and Nicotine dependence. SURGICAL HISTORY      has a past surgical history that includes  section and thyroglossal duct excision (N/A, 2019). CURRENT MEDICATIONS       Discharge Medication List as of 12/10/2022  2:11 AM        CONTINUE these medications which have NOT CHANGED    Details   albuterol (PROVENTIL) (2.5 MG/3ML) 0.083% nebulizer solution Take 3 mLs by nebulization every 6 hours as needed for Wheezing, Disp-60 each, R-0Normal      ipratropium-albuterol (DUONEB) 0.5-2.5 (3) MG/3ML SOLN nebulizer solution Inhale 3 mLs into the lungs every 4 hours as needed for Shortness of Breath, Disp-240 mL, R-0Normal      acetaminophen (TYLENOL) 500 MG tablet Take 1,000 mg by mouth every 6 hours as needed for PainHistorical Med      albuterol sulfate HFA (PROVENTIL HFA) 108 (90 Base) MCG/ACT inhaler Inhale 2 puffs into the lungs every 4 hours as needed for Wheezing or Shortness of Breath, Disp-18 g, R-0Normal      Respiratory Therapy Supplies (PEDIATRIC COMPRESSOR/NEBULIZER) KIT 1 each by Does not apply route once for 1 dose, Disp-1 kit,R-0Print      ibuprofen (ADVIL;MOTRIN) 800 MG tablet Take 1 tablet by mouth every 8 hours as needed for Pain, Disp-30 tablet, R-0Print             ALLERGIES     is allergic to rocephin [ceftriaxone] and toradol [ketorolac tromethamine]. HISTORY     She indicated that her mother is alive. She indicated that her father is alive. She indicated that both of her sisters are alive.  She indicated that both of her brothers are alive. family history includes Asthma in her brother and sister; Diabetes in her mother; High Blood Pressure in her mother; Other in her father. SOCIALHISTORY      reports that she quit smoking about 3 years ago. She has never used smokeless tobacco. She reports that she does not drink alcohol and does not use drugs. PHYSICAL EXAM     INITIAL VITALS:  oral temperature is 98 °F (36.7 °C). Her blood pressure is 144/97 (abnormal) and her pulse is 67. Her respiration is 16 and oxygen saturation is 99%. Physical Exam  Vitals and nursing note reviewed. HENT:      Head: Normocephalic and atraumatic. Mouth/Throat:      Mouth: Mucous membranes are moist.      Pharynx: Oropharynx is clear. Eyes:      Pupils: Pupils are equal, round, and reactive to light. Neck:      Thyroid: No thyromegaly. Trachea: No tracheal deviation. Cardiovascular:      Rate and Rhythm: Normal rate and regular rhythm. Pulses: Normal pulses. Heart sounds: Normal heart sounds. No murmur heard. No friction rub. Pulmonary:      Effort: Pulmonary effort is normal. No respiratory distress. Breath sounds: Normal breath sounds. No wheezing. Abdominal:      General: Bowel sounds are normal. There is no distension. Palpations: Abdomen is soft. Tenderness: There is no abdominal tenderness. Musculoskeletal:         General: Normal range of motion. Cervical back: Neck supple. Skin:     General: Skin is warm and dry. Capillary Refill: Capillary refill takes less than 2 seconds. Findings: No erythema or rash. Neurological:      Mental Status: She is alert and oriented to person, place, and time. Psychiatric:         Mood and Affect: Mood normal.         Behavior: Behavior normal.         Thought Content:  Thought content normal.       DIFFERENTIAL DIAGNOSIS:   Pelvic pain rule out ovarian torsion    DIAGNOSTIC RESULTS     EKG: All EKG's are interpreted by the Emergency Department Physician who either signs or Co-signs this chart in the absence of a cardiologist.      RADIOLOGY: non-plain film images(s) such as CT, Ultrasound and MRI are read by the radiologist.  US NON OB TRANSVAGINAL   Final Result   Impression:   No evidence of ovarian torsion. Ovarian cystic changes as above. This document has been electronically signed by: Clint Dean MD on    12/10/2022 02:55 AM      US DUP ABD PEL RETRO SCROT COMPLETE    (Results Pending)         LABS:   Labs Reviewed   CBC WITH AUTO DIFFERENTIAL - Abnormal; Notable for the following components:       Result Value    WBC 11.0 (*)     RDW-CV 15.5 (*)     RDW-SD 46.5 (*)     All other components within normal limits   COVID-19 & INFLUENZA COMBO   CULTURE, THROAT    Narrative:     Source: Specimen not received       Site:           Current Antibiotics:   BASIC METABOLIC PANEL   HCG, SERUM, QUALITATIVE   GROUP A STREP, REFLEX   ANION GAP   GLOMERULAR FILTRATION RATE, ESTIMATED   OSMOLALITY       EMERGENCY DEPARTMENT COURSE:   :    Vitals:    12/09/22 2256   BP: (!) 144/97   Pulse: 67   Resp: 16   Temp: 98 °F (36.7 °C)   TempSrc: Oral   SpO2: 99%     Patient was seen history physical exam was performed. Pt had US performed. Pt wants to sign out AMA. I did explain to her ovarian torsion was still possible and should stay to rule this out. She says that she will check her report on my chart and come back if positive. She does understand it is time sensitive. See disposition below    CRITICAL CARE:  None    CONSULTS:  None    PROCEDURES:  None    FINAL IMPRESSION      1.  Pelvic pain          DISPOSITION/PLAN   AMA    PATIENT REFERRED TO:  Amara Underwood, [address] [facility], [address]  [phone]    In 1 day  or if you change your mind about completing workup    DISCHARGE MEDICATIONS:  Discharge Medication List as of 12/10/2022  2:11 AM          (Please note that portions of this note were completed with a voice recognitionprogram.  Efforts were made to edit the dictations but occasionally words are mis-transcribed.)    ABRAHAM Ruvalcaba PA  12/10/22 7343

## 2022-12-10 NOTE — ED NOTES
Pt wanting to leave AMA due to having a  at home. Pt aware of risks and expresses understanding.       Redd Emanuel RN  12/10/22 2613

## 2022-12-11 LAB — THROAT/NOSE CULTURE: NORMAL

## 2022-12-12 LAB — THROAT/NOSE CULTURE: NORMAL

## 2023-02-17 ENCOUNTER — HOSPITAL ENCOUNTER (EMERGENCY)
Age: 33
Discharge: HOME | End: 2023-02-17
Attending: EMERGENCY MEDICINE
Payer: MEDICAID

## 2023-02-17 VITALS
SYSTOLIC BLOOD PRESSURE: 137 MMHG | WEIGHT: 260 LBS | TEMPERATURE: 98.1 F | RESPIRATION RATE: 18 BRPM | HEIGHT: 69 IN | OXYGEN SATURATION: 97 % | HEART RATE: 81 BPM | BODY MASS INDEX: 38.51 KG/M2 | DIASTOLIC BLOOD PRESSURE: 90 MMHG

## 2023-02-17 DIAGNOSIS — J06.9 ACUTE UPPER RESPIRATORY INFECTION: Primary | ICD-10-CM

## 2023-02-17 LAB
FLUAV RNA RESP QL NAA+PROBE: NOT DETECTED
FLUBV RNA RESP QL NAA+PROBE: NOT DETECTED
SARS-COV-2 RNA RESP QL NAA+PROBE: NOT DETECTED

## 2023-02-17 PROCEDURE — 99283 EMERGENCY DEPT VISIT LOW MDM: CPT

## 2023-02-17 PROCEDURE — 87636 SARSCOV2 & INF A&B AMP PRB: CPT

## 2023-02-17 ASSESSMENT — PAIN - FUNCTIONAL ASSESSMENT: PAIN_FUNCTIONAL_ASSESSMENT: NONE - DENIES PAIN

## 2023-02-18 NOTE — ED PROVIDER NOTES
[facility] [address] [address] EMERGENCY DEPT    EMERGENCY MEDICINE      Pt Name: Angie Boswell  MRN: [id number]  [place] 1990  Date of evaluation: 2023  Treating Resident Physician: Roly King MD  Supervising Physician: Da Asher DO    CHIEF COMPLAINT       Chief Complaint   Patient presents with    Cough     History obtained from chart review and the patient. HISTORY OF PRESENT ILLNESS    HPI    Angie Boswell is a 34 y.o. female with PMH of asthma presents to the emergency department for evaluation of cough and congestion and sore throat for the past 3 days. Patient came in with her family in which all of them are having the same symptoms for the past 3 days. He describes some rhinorrhea as well. Cough is nonproductive, and she denies shortness of breath and chest pain. Patient stated that they went to see PCP 2 days ago diagnosed him with strep throat. She denied having any testing done but stated that their whole family was started on antibiotics. No record was seen and chart for strep testing, diagnosis, antibiotic prescriptions. Mother stated she is coming in because she is concerned due to her history of asthma that she could develop pneumonia. Patient is denying any nausea, vomiting, fever, bowel changes. The patient has no other acute complaints at this time.     PAST MEDICAL AND SURGICAL HISTORY     Past Medical History:   Diagnosis Date    Anxiety disorder     history of anxiety, COVID related was on meds, not on meds anymore    Asthma     PRN inhaler    Nicotine dependence        Past Surgical History:   Procedure Laterality Date     SECTION      x3    THYROGLOSSAL DUCT EXCISION N/A 2019    EXCISION OF THYROGLOSSAL DUCT OR SINUS performed by Michelle Pope MD at [address] [facility]     Discharge Medication List as of 2023 10:21 PM        CONTINUE these medications which have NOT CHANGED    Details   albuterol (PROVENTIL) (2.5 MG/3ML) 0.083% nebulizer solution Take 3 mLs by nebulization every 6 hours as needed for Wheezing, Disp-60 each, R-0Normal      ipratropium-albuterol (DUONEB) 0.5-2.5 (3) MG/3ML SOLN nebulizer solution Inhale 3 mLs into the lungs every 4 hours as needed for Shortness of Breath, Disp-240 mL, R-0Normal      acetaminophen (TYLENOL) 500 MG tablet Take 1,000 mg by mouth every 6 hours as needed for PainHistorical Med      albuterol sulfate HFA (PROVENTIL HFA) 108 (90 Base) MCG/ACT inhaler Inhale 2 puffs into the lungs every 4 hours as needed for Wheezing or Shortness of Breath, Disp-18 g, R-0Normal      Respiratory Therapy Supplies (PEDIATRIC COMPRESSOR/NEBULIZER) KIT 1 each by Does not apply route once for 1 dose, Disp-1 kit,R-0Print      ibuprofen (ADVIL;MOTRIN) 800 MG tablet Take 1 tablet by mouth every 8 hours as needed for Pain, Disp-30 tablet, R-0Print             ALLERGIES     Allergies   Allergen Reactions    Rocephin [Ceftriaxone] Anaphylaxis    Toradol [Ketorolac Tromethamine] Hives       FAMILY HISTORY     Family History   Problem Relation Age of Onset    Diabetes Mother     High Blood Pressure Mother     Other Father         Sickle Cell Trait    Asthma Sister     Asthma Brother        SOCIAL HISTORY     Social History     Tobacco Use    Smoking status: Former     Packs/day: 0.00     Years: 9.00     Pack years: 0.00     Types: Cigarettes     Quit date: 2019     Years since quittin.0    Smokeless tobacco: Never   Vaping Use    Vaping Use: Never used   Substance Use Topics    Alcohol use: No    Drug use: No       PHYSICAL EXAM     ED Triage Vitals [23]   BP Temp Temp Source Heart Rate Resp SpO2 Height Weight   (!) 137/90 98.1 °F (36.7 °C) Oral 81 18 97 % 5' 9\" (1.753 m) 260 lb (117.9 kg)     Body mass index is 38.4 kg/m².      Initial vital signs and nursing assessment reviewed and normal.    Physical Exam     FORMAL DIAGNOSTIC RESULTS     RADIOLOGY: Interpretation per the Radiologist below, if available at the time of this note (none if blank): No orders to display       LABS: (none if blank)  Labs Reviewed   COVID-19 & INFLUENZA COMBO       (Any cultures that may have been sent were not resulted at the time of this patient visit)    [address]     1) Number and Complexity of Problems            Problem List This Visit:         Chief Complaint   Patient presents with    Cough          Differential Diagnosis includes (but not limited to):  URI, COVID, flu        Diagnoses Considered with low index of suspicion:   Strep pharyngitis, PNA             Pertinent Comorbid Conditions:    Asthma    2)  Data Reviewed (none if blank or additional interpretation can be found in ED Course)          My Independent interpretations:     EKG:          Imaging:     Labs:      COVID flu swab negative                 Decision Rules/Clinical Scores utilized:              External Documentation Reviewed:   Previous patient encounter documents & history available on EMR was reviewed as available. 3)  Treatment and Disposition         ED Reassessment:    patient remained stable throughout time in ED and no change in complaints. Case discussed with consulting clinician:           Shared Decision-Making was performed and disposition discussed with the        patient/caregiver at bedside with all questions answered. Social determinants of health impacting treatment or disposition:           Code Status: Full code      Summary of Patient Presentation:      MDM  Number of Diagnoses or Management Options  Acute upper respiratory infection: new, needed workup  Diagnosis management comments: 79-year-old female come to the ED for evaluation of cough, congestion. Patient describes recent diagnosis of strep and antibiotics for the past 2 days. Patient has no clinical indications of strep and is describing URI symptoms with congestion, cough.   Patient is concerned that she might develop pneumonia due to her history of asthma and having a cough. Patient breath sounds were clear,  she was afebrile, with no complaints of nausea, vomiting, chest pain, abdominal pain. Patient did complain sore throat but throat exam was normal.  Everyone in patient's house has same symptoms and complaints. Discussed with patient that URI is likely cause of her symptoms despite negative COVID and flu test.  Strict return precautions were discussed with patient she was discharged with recommended outpatient follow up. Amount and/or Complexity of Data Reviewed  Clinical lab tests: ordered and reviewed    Patient Progress  Patient progress: stable  /   Vitals Reviewed:    Vitals:    02/17/23 2045   BP: (!) 137/90   Pulse: 81   Resp: 18   Temp: 98.1 °F (36.7 °C)   TempSrc: Oral   SpO2: 97%   Weight: 260 lb (117.9 kg)   Height: 5' 9\" (1.753 m)       The patient was seen and examined. Appropriate diagnostic testing was performed and results reviewed with the patient and/or caregivers. The results of pertinent diagnostic studies and exam findings were discussed. The patients provisional diagnosis and plan of care were discussed with the patient and present caregivers who expressed understanding. Any medications were reviewed and indications and risks of medications were discussed. Strict verbal and written return precautions, instructions and appropriate follow-up were provided to the patient. ED Medications administered this visit:  (None if blank)  Medications - No data to display    PROCEDURES: (None if blank)  Procedures:     CRITICAL CARE: (None if blank)    DISCHARGE PRESCRIPTIONS: (None if blank)  Discharge Medication List as of 2/17/2023 10:21 PM            FINAL IMPRESSION     Final diagnoses:   Acute upper respiratory infection     1.  Acute upper respiratory infection        DISPOSITION / PLAN   DISPOSITION Decision To Discharge 02/17/2023 10:04:16 PM      OUTPATIENT FOLLOW UP THE PATIENT:  Brien Soaresross, APRN - CNP  [address]  [facility] [address]  [phone]    Call in 2 days  follow    [facility]  [address] [place] [address] Rd  [phone]  Go to   If symptoms worsen      This transcription was electronically signed. Parts of this transcriptions may have been dictated by use of voice recognition software and electronically transcribed, and parts may have been transcribed with the assistance of an ED scribe. The transcription may contain errors not detected in proofreading. Please refer to my supervising physician's documentation if my documentation differs.     Electronically Signed: Luciana Deleon MD, 02/18/23, 6:33 PM        Luciana Deleon MD  Resident  02/18/23 9360

## 2023-02-18 NOTE — ED TRIAGE NOTES
Pt presents to the ED from home with complaints of having a cough and nasal congestion for the past few days.

## 2023-02-18 NOTE — ED PROVIDER NOTES
[address] [facility] [address] [address] EMERGENCY DEPT  ED Attending Physician Attestation     I performed a history and physical examination of the patient and discussed management with the Resident. I reviewed the Resident's note and agree with the documented findings and plan of care. Any areas of disagreement are noted on the chart. I was personally present for the key portions of any procedures and have documented in the chart those procedures where I was not present during the key portions. I have reviewed the emergency nurses triage note and agree with the chief complaint, past medical history, past surgical history, allergies, medications, social and family history as documented unless otherwise noted below.     Well appearing 31yo female here with nonproductive cough and congestion  No fevers  Overall looks well  Multiple family members with similar presentation    HEENT wnl  Neck supple  Lungs clear  HRRR no mcg  Abd SNT ND  LE's no edema  Neuro WNL    My suspicion for bacterial process, pneumonia, or sepsis is low    Digna Oh, [address] [facility]  Attending Emergency Physician        Regino Diallo, DO  02/17/23 1075

## 2023-05-04 ENCOUNTER — HOSPITAL ENCOUNTER (EMERGENCY)
Age: 33
Discharge: HOME | End: 2023-05-04
Payer: MEDICAID

## 2023-05-04 VITALS
DIASTOLIC BLOOD PRESSURE: 75 MMHG | SYSTOLIC BLOOD PRESSURE: 147 MMHG | HEART RATE: 93 BPM | OXYGEN SATURATION: 98 % | RESPIRATION RATE: 20 BRPM | BODY MASS INDEX: 38.4 KG/M2 | TEMPERATURE: 98.3 F | WEIGHT: 260 LBS

## 2023-05-04 DIAGNOSIS — J45.21 MILD INTERMITTENT ASTHMA WITH (ACUTE) EXACERBATION: Primary | ICD-10-CM

## 2023-05-04 PROCEDURE — 99213 OFFICE O/P EST LOW 20 MIN: CPT

## 2023-05-04 PROCEDURE — 99213 OFFICE O/P EST LOW 20 MIN: CPT | Performed by: EMERGENCY MEDICINE

## 2023-05-04 RX ORDER — ALBUTEROL SULFATE 90 UG/1
2 AEROSOL, METERED RESPIRATORY (INHALATION) EVERY 6 HOURS PRN
Qty: 18 G | Refills: 0 | Status: SHIPPED | OUTPATIENT
Start: 2023-05-04

## 2023-05-04 RX ORDER — PREDNISONE 20 MG/1
20 TABLET ORAL DAILY
Qty: 5 TABLET | Refills: 0 | Status: SHIPPED | OUTPATIENT
Start: 2023-05-04 | End: 2023-05-09

## 2023-05-04 ASSESSMENT — ENCOUNTER SYMPTOMS
COUGH: 1
RHINORRHEA: 0
SINUS PRESSURE: 0
WHEEZING: 1
ABDOMINAL PAIN: 0
CHEST TIGHTNESS: 1
SINUS PAIN: 0
SORE THROAT: 0

## 2023-05-04 ASSESSMENT — PAIN - FUNCTIONAL ASSESSMENT: PAIN_FUNCTIONAL_ASSESSMENT: NONE - DENIES PAIN

## 2023-05-04 NOTE — DISCHARGE INSTRUCTIONS
Keep your inhaler with you at all times    Prednisone as directed    Albuterol inhaler or nebulizer solution as directed as needed for wheezing/shortness of breath    Return here or go to the emergency department for new or worsening symptoms

## 2023-05-04 NOTE — ED PROVIDER NOTES
[facility]  Urgent Care Encounter       CHIEF COMPLAINT       Chief Complaint   Patient presents with    Cough     Chest congestion         Nurses Notes reviewed and I agree except as noted in the HPI. HISTORY OF PRESENT ILLNESS   Edwar Porter is a 34 y.o. female who presents for complaints of flareup of her asthma. Patient states this has increased over the past 2 days. She states that usually pretty well under control. Patient has been using nebulizer solution for treatment of her wheezing with improvement. However, the patient was at work today and could not use her nebulizer solution. The patient is currently out of her albuterol inhaler. She will likely need her inhaler while at work during this acute exacerbation. HPI    REVIEW OF SYSTEMS     Review of Systems   Constitutional:  Negative for activity change, fatigue and fever. HENT:  Positive for congestion. Negative for rhinorrhea, sinus pressure, sinus pain and sore throat. Respiratory:  Positive for cough, chest tightness and wheezing. Cardiovascular:  Negative for chest pain, palpitations and leg swelling. Gastrointestinal:  Negative for abdominal pain. Neurological:  Negative for dizziness and headaches. PAST MEDICAL HISTORY         Diagnosis Date    Anxiety disorder     history of anxiety, COVID related was on meds, not on meds anymore    Asthma     PRN inhaler    Nicotine dependence        SURGICALHISTORY     Patient  has a past surgical history that includes  section and thyroglossal duct excision (N/A, 2019).     CURRENT MEDICATIONS       Discharge Medication List as of 2023  3:56 PM        CONTINUE these medications which have NOT CHANGED    Details   albuterol (PROVENTIL) (2.5 MG/3ML) 0.083% nebulizer solution Take 3 mLs by nebulization every 6 hours as needed for Wheezing, Disp-60 each, R-0Normal      ipratropium-albuterol (DUONEB) 0.5-2.5 (3) MG/3ML SOLN nebulizer solution

## 2023-05-04 NOTE — ED TRIAGE NOTES
Patient to room with c/o productive cough, head congestion, chest congestion, and wheezing beginning yesterday.

## 2023-06-18 ENCOUNTER — APPOINTMENT (OUTPATIENT)
Dept: ULTRASOUND IMAGING | Age: 33
End: 2023-06-18
Payer: MEDICAID

## 2023-06-18 ENCOUNTER — HOSPITAL ENCOUNTER (EMERGENCY)
Age: 33
Discharge: HOME | End: 2023-06-18
Payer: MEDICAID

## 2023-06-18 VITALS
BODY MASS INDEX: 39.99 KG/M2 | WEIGHT: 270 LBS | SYSTOLIC BLOOD PRESSURE: 142 MMHG | RESPIRATION RATE: 16 BRPM | OXYGEN SATURATION: 98 % | HEART RATE: 62 BPM | TEMPERATURE: 98 F | HEIGHT: 69 IN | DIASTOLIC BLOOD PRESSURE: 67 MMHG

## 2023-06-18 DIAGNOSIS — R10.2 PELVIC PAIN IN FEMALE: Primary | ICD-10-CM

## 2023-06-18 LAB
ANION GAP SERPL CALC-SCNC: 10 MEQ/L (ref 8–16)
BACTERIA URNS QL MICRO: ABNORMAL /HPF
BASOPHILS ABSOLUTE: 0 THOU/MM3 (ref 0–0.1)
BASOPHILS NFR BLD AUTO: 0.3 %
BILIRUB UR QL STRIP.AUTO: NEGATIVE
BUN SERPL-MCNC: 8 MG/DL (ref 7–22)
CALCIUM SERPL-MCNC: 8.7 MG/DL (ref 8.5–10.5)
CASTS #/AREA URNS LPF: ABNORMAL /LPF
CASTS 2: ABNORMAL /LPF
CHARACTER UR: CLEAR
CHLORIDE SERPL-SCNC: 108 MEQ/L (ref 98–111)
CO2 SERPL-SCNC: 24 MEQ/L (ref 23–33)
COLOR: YELLOW
CREAT SERPL-MCNC: 0.8 MG/DL (ref 0.4–1.2)
CRYSTALS URNS MICRO: ABNORMAL
DEPRECATED RDW RBC AUTO: 39.9 FL (ref 35–45)
EOSINOPHIL NFR BLD AUTO: 3.3 %
EOSINOPHILS ABSOLUTE: 0.3 THOU/MM3 (ref 0–0.4)
EPITHELIAL CELLS, UA: ABNORMAL /HPF
ERYTHROCYTE [DISTWIDTH] IN BLOOD BY AUTOMATED COUNT: 13.2 % (ref 11.5–14.5)
GFR SERPL CREATININE-BSD FRML MDRD: > 60 ML/MIN/1.73M2
GLUCOSE SERPL-MCNC: 84 MG/DL (ref 70–108)
GLUCOSE UR QL STRIP.AUTO: NEGATIVE MG/DL
HCG INTACT+B SERPL-ACNC: < 1 MIU/ML (ref 0–5)
HCT VFR BLD AUTO: 39 % (ref 37–47)
HGB BLD-MCNC: 12.5 GM/DL (ref 12–16)
HGB UR QL STRIP.AUTO: NEGATIVE
IMM GRANULOCYTES # BLD AUTO: 0.02 THOU/MM3 (ref 0–0.07)
IMM GRANULOCYTES NFR BLD AUTO: 0.2 %
KETONES UR QL STRIP.AUTO: NEGATIVE
KOH PREP SPEC: NORMAL
LYMPHOCYTES ABSOLUTE: 2.8 THOU/MM3 (ref 1–4.8)
LYMPHOCYTES NFR BLD AUTO: 31.5 %
MCH RBC QN AUTO: 26.6 PG (ref 26–33)
MCHC RBC AUTO-ENTMCNC: 32.1 GM/DL (ref 32.2–35.5)
MCV RBC AUTO: 83 FL (ref 81–99)
MISCELLANEOUS 2: ABNORMAL
MONOCYTES ABSOLUTE: 0.5 THOU/MM3 (ref 0.4–1.3)
MONOCYTES NFR BLD AUTO: 5.8 %
NEUTROPHILS NFR BLD AUTO: 58.9 %
NITRITE UR QL STRIP: NEGATIVE
NRBC BLD AUTO-RTO: 0 /100 WBC
OSMOLALITY SERPL CALC.SUM OF ELEC: 280.6 MOSMOL/KG (ref 275–300)
PH UR STRIP.AUTO: 7 [PH] (ref 5–9)
PLATELET # BLD AUTO: 286 THOU/MM3 (ref 130–400)
PMV BLD AUTO: 10.8 FL (ref 9.4–12.4)
POTASSIUM SERPL-SCNC: 3.8 MEQ/L (ref 3.5–5.2)
PROT UR STRIP.AUTO-MCNC: NEGATIVE MG/DL
RBC # BLD AUTO: 4.7 MILL/MM3 (ref 4.2–5.4)
RBC URINE: ABNORMAL /HPF
RENAL EPI CELLS #/AREA URNS HPF: ABNORMAL /[HPF]
SEGMENTED NEUTROPHILS ABSOLUTE COUNT: 5.2 THOU/MM3 (ref 1.8–7.7)
SODIUM SERPL-SCNC: 142 MEQ/L (ref 135–145)
SP GR UR REFRACT.AUTO: 1.01 (ref 1–1.03)
TRICHOMONAS PREP: NORMAL
UROBILINOGEN, URINE: 0.2 EU/DL (ref 0–1)
WBC # BLD AUTO: 8.9 THOU/MM3 (ref 4.8–10.8)
WBC #/AREA URNS HPF: ABNORMAL /HPF
WBC #/AREA URNS HPF: ABNORMAL /[HPF]
YEAST LIKE FUNGI URNS QL MICRO: ABNORMAL

## 2023-06-18 PROCEDURE — 87077 CULTURE AEROBIC IDENTIFY: CPT

## 2023-06-18 PROCEDURE — 87186 SC STD MICRODIL/AGAR DIL: CPT

## 2023-06-18 PROCEDURE — 76830 TRANSVAGINAL US NON-OB: CPT

## 2023-06-18 PROCEDURE — 87086 URINE CULTURE/COLONY COUNT: CPT

## 2023-06-18 PROCEDURE — 99284 EMERGENCY DEPT VISIT MOD MDM: CPT

## 2023-06-18 PROCEDURE — 87210 SMEAR WET MOUNT SALINE/INK: CPT

## 2023-06-18 PROCEDURE — 81001 URINALYSIS AUTO W/SCOPE: CPT

## 2023-06-18 PROCEDURE — 93005 ELECTROCARDIOGRAM TRACING: CPT | Performed by: STUDENT IN AN ORGANIZED HEALTH CARE EDUCATION/TRAINING PROGRAM

## 2023-06-18 PROCEDURE — 85025 COMPLETE CBC W/AUTO DIFF WBC: CPT

## 2023-06-18 PROCEDURE — 87070 CULTURE OTHR SPECIMN AEROBIC: CPT

## 2023-06-18 PROCEDURE — 84702 CHORIONIC GONADOTROPIN TEST: CPT

## 2023-06-18 PROCEDURE — 87205 SMEAR GRAM STAIN: CPT

## 2023-06-18 PROCEDURE — 80048 BASIC METABOLIC PNL TOTAL CA: CPT

## 2023-06-18 PROCEDURE — 93975 VASCULAR STUDY: CPT

## 2023-06-18 PROCEDURE — 87491 CHLMYD TRACH DNA AMP PROBE: CPT

## 2023-06-18 PROCEDURE — 87220 TISSUE EXAM FOR FUNGI: CPT

## 2023-06-18 PROCEDURE — 87591 N.GONORRHOEAE DNA AMP PROB: CPT

## 2023-06-18 PROCEDURE — 36415 COLL VENOUS BLD VENIPUNCTURE: CPT

## 2023-06-18 ASSESSMENT — PAIN - FUNCTIONAL ASSESSMENT
PAIN_FUNCTIONAL_ASSESSMENT: 0-10

## 2023-06-18 ASSESSMENT — PAIN DESCRIPTION - LOCATION
LOCATION: PERINEUM
LOCATION: ABDOMEN
LOCATION: ABDOMEN

## 2023-06-18 ASSESSMENT — PAIN SCALES - GENERAL
PAINLEVEL_OUTOF10: 5
PAINLEVEL_OUTOF10: 4
PAINLEVEL_OUTOF10: 6
PAINLEVEL_OUTOF10: 5

## 2023-06-18 ASSESSMENT — PAIN DESCRIPTION - PAIN TYPE
TYPE: ACUTE PAIN
TYPE: ACUTE PAIN

## 2023-06-18 ASSESSMENT — PAIN DESCRIPTION - DESCRIPTORS: DESCRIPTORS: ACHING

## 2023-06-18 ASSESSMENT — PAIN DESCRIPTION - FREQUENCY: FREQUENCY: CONTINUOUS

## 2023-06-19 LAB
BACTERIA UR CULT: ABNORMAL
CHLAMYDIA TRACHOMATIS BY RT-PCR: NOT DETECTED
CT/NG SOURCE: NORMAL
NEISSERIA GONORRHOEAE BY RT-PCR: NOT DETECTED
ORGANISM: ABNORMAL

## 2023-06-19 PROCEDURE — 93010 ELECTROCARDIOGRAM REPORT: CPT | Performed by: NUCLEAR MEDICINE

## 2023-06-19 ASSESSMENT — ENCOUNTER SYMPTOMS
CONSTIPATION: 0
SORE THROAT: 0
PHOTOPHOBIA: 0
ABDOMINAL PAIN: 0
RHINORRHEA: 0
BACK PAIN: 0
DIARRHEA: 0
COUGH: 0
VOMITING: 0
SHORTNESS OF BREATH: 0
NAUSEA: 0

## 2023-06-19 NOTE — ED NOTES
Chaperoned appropriate pelvic exam by Benoit Abbott PA-C. Swabs sent.       Hayden Prather  06/18/23 2933

## 2023-06-19 NOTE — ED NOTES
Patient in ultrasound     Sandro Mcgee, [facility]  06/18/23 2126

## 2023-06-19 NOTE — ED NOTES
Patient returned from ultrasound. Patient resting in bed. Respirations regular and unlabored. Patient denies needs at this time. Call light within reach.       Hilda Cooney RN  06/18/23 5910

## 2023-06-19 NOTE — ED NOTES
Patient resting in bed. Respirations regular and unlabored. No distress noted. Call light within reach.       Dyan Delgado RN  06/18/23 6471

## 2023-06-19 NOTE — ED PROVIDER NOTES
[address] [facility] [address] [address] EMERGENCY DEPT      Pt Name: Jeff Lerner  MRN: [id number]  [place] 1990  Date of evaluation: 6/18/2023  Provider: Lois Webber PA-C    CHIEF COMPLAINT       Chief Complaint   Patient presents with    Dizziness    Abdominal Pain       Nurses Notes reviewed and I agree except as noted in the HPI. HISTORY OF PRESENT ILLNESS    Jeff Lerner is a 34 y.o. female who presents from home private vehicle for left pelvic pain. Patient has had constant left-sided pelvic pain today that reminds her of her previous ovarian cyst.  Rocking back and forth helps the pain however she has not taken any medications. At 1 point the pain intensified causing lightheadedness and hot flashes. This is what ultimately prompted her to come to the ER. The patient denies nausea, vomiting, bowel changes, UTI symptoms, abnormal vaginal discharge, fever, chills, abdominal pain, back pain, or other complaints. Patient is sexually active with 1 partner and she feels it is low likelihood for STD exposure. Patient has a history of asthma. Last menstrual period was last week although abnormal.  Patient does not take birth control however sometimes uses condoms. Location/Symptom: Pelvic pain  Timing/Onset: Today  Context/Setting: Spontaneous onset; feels like prior ovarian cyst  Quality: Sharp  Duration: Constant  Modifying Factors: Improves if she rocks back and forth  Severity: Mild currently    REVIEW OF SYSTEMS     Review of Systems   Constitutional:  Negative for appetite change, chills and fever. HENT:  Negative for congestion, ear pain, rhinorrhea and sore throat. Eyes:  Negative for photophobia. Respiratory:  Negative for cough and shortness of breath. Cardiovascular:  Negative for chest pain. Gastrointestinal:  Negative for abdominal pain, constipation, diarrhea, nausea and vomiting. Endocrine: Negative for polyuria. Genitourinary:  Positive for pelvic pain.  Negative for

## 2023-06-20 ENCOUNTER — TELEPHONE (OUTPATIENT)
Dept: PHARMACY | Age: 33
End: 2023-06-20

## 2023-06-20 LAB
EKG ATRIAL RATE: 58 BPM
EKG P AXIS: 143 DEGREES
EKG P-R INTERVAL: 136 MS
EKG Q-T INTERVAL: 392 MS
EKG QRS DURATION: 76 MS
EKG QTC CALCULATION (BAZETT): 384 MS
EKG R AXIS: -4 DEGREES
EKG T AXIS: 10 DEGREES
EKG VENTRICULAR RATE: 58 BPM

## 2023-06-20 RX ORDER — SULFAMETHOXAZOLE AND TRIMETHOPRIM 800; 160 MG/1; MG/1
1 TABLET ORAL 2 TIMES DAILY
Qty: 6 TABLET | Refills: 0 | Status: SHIPPED | OUTPATIENT
Start: 2023-06-20 | End: 2023-06-23

## 2023-06-20 RX ORDER — METRONIDAZOLE 500 MG/1
500 TABLET ORAL 2 TIMES DAILY
Qty: 14 TABLET | Refills: 0 | Status: SHIPPED | OUTPATIENT
Start: 2023-06-20 | End: 2023-06-27

## 2023-06-20 NOTE — TELEPHONE ENCOUNTER
Pharmacy Note  ED Culture Follow-up    Albaro Rowell is a 34 y.o. female. Allergies: Rocephin [ceftriaxone] and Toradol [ketorolac tromethamine]     Labs:  Lab Results   Component Value Date    BUN 8 06/18/2023    CREATININE 0.8 06/18/2023    WBC 8.9 06/18/2023     Estimated Creatinine Clearance: 140 mL/min (based on SCr of 0.8 mg/dL). Current antimicrobials:   None    ASSESSMENT:  Micro results:   Urine culture: positive for Klebsiella pneumoniae   Genital culture: positive for BV  PLAN:  Need for intervention: Yes  Discussed with: Tevin Moses treatment:    Start metronidazole 500 mg BID x 7 days. If patient having urinary symptoms, start Bactrim DS BID x 3 days. Patient response:   Called and spoke with patient. She was informed of the positive results. She was not currently complaining of urinary symptoms at this time, but would still like antibiotics for treatment. Prescriptions for both antibiotics sent. Called/sent in prescription to:  [facility]    Please call with any questions.  [address] [facility] [place], PharmD 4:40 PM 6/20/2023

## 2023-06-21 LAB
BACTERIA GENITAL AEROBE CULT: NORMAL
GRAM STN GENITAL: NORMAL

## 2023-12-05 ENCOUNTER — HOSPITAL ENCOUNTER (EMERGENCY)
Age: 33
Discharge: HOME | End: 2023-12-05
Attending: EMERGENCY MEDICINE
Payer: OTHER MISCELLANEOUS

## 2023-12-05 VITALS — RESPIRATION RATE: 19 BRPM | TEMPERATURE: 98.3 F | HEART RATE: 88 BPM | OXYGEN SATURATION: 100 %
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
## 2023-12-05 DIAGNOSIS — N39.0 URINARY TRACT INFECTION WITHOUT HEMATURIA, SITE UNSPECIFIED: ICD-10-CM

## 2023-12-05 LAB
B-HCG SERPL QL: POSITIVE
BACTERIA URNS QL MICRO: ABNORMAL /HPF
BILIRUB UR QL STRIP.AUTO: NEGATIVE
CASTS #/AREA URNS LPF: ABNORMAL /LPF
CASTS 2: ABNORMAL /LPF
CHARACTER UR: ABNORMAL
COLOR: YELLOW
CRYSTALS URNS MICRO: ABNORMAL
EPITHELIAL CELLS, UA: ABNORMAL /HPF
GLUCOSE UR QL STRIP.AUTO: NEGATIVE MG/DL
HCG INTACT+B SERPL-ACNC: 4867 MIU/ML (ref 0–5)
HGB UR QL STRIP.AUTO: NEGATIVE
KETONES UR QL STRIP.AUTO: NEGATIVE
MISCELLANEOUS 2: ABNORMAL
NITRITE UR QL STRIP: NEGATIVE
PH UR STRIP.AUTO: 6 [PH] (ref 5–9)
PROT UR STRIP.AUTO-MCNC: NEGATIVE MG/DL
RBC URINE: ABNORMAL /HPF
RENAL EPI CELLS #/AREA URNS HPF: ABNORMAL /[HPF]
SP GR UR REFRACT.AUTO: 1.01 (ref 1–1.03)
UROBILINOGEN, URINE: 0.2 EU/DL (ref 0–1)
WBC #/AREA URNS HPF: > 100 /HPF
WBC #/AREA URNS HPF: ABNORMAL /[HPF]
YEAST LIKE FUNGI URNS QL MICRO: ABNORMAL

## 2023-12-05 PROCEDURE — 87086 URINE CULTURE/COLONY COUNT: CPT

## 2023-12-05 PROCEDURE — 87186 SC STD MICRODIL/AGAR DIL: CPT

## 2023-12-05 PROCEDURE — 99283 EMERGENCY DEPT VISIT LOW MDM: CPT

## 2023-12-05 PROCEDURE — 87077 CULTURE AEROBIC IDENTIFY: CPT

## 2023-12-05 PROCEDURE — 81001 URINALYSIS AUTO W/SCOPE: CPT

## 2023-12-05 PROCEDURE — 84703 CHORIONIC GONADOTROPIN ASSAY: CPT

## 2023-12-05 PROCEDURE — 84702 CHORIONIC GONADOTROPIN TEST: CPT

## 2023-12-05 PROCEDURE — 36415 COLL VENOUS BLD VENIPUNCTURE: CPT

## 2023-12-05 ASSESSMENT — PAIN - FUNCTIONAL ASSESSMENT: PAIN_FUNCTIONAL_ASSESSMENT: 0-10

## 2023-12-05 ASSESSMENT — PAIN SCALES - GENERAL: PAINLEVEL_OUTOF10: 2

## 2023-12-06 NOTE — DISCHARGE INSTRUCTIONS
Follow-up with your OB/GYN in 3 to 5 days, regarding the pregnancy. Return to ER for any new pain or any new symptoms or concerns.

## 2023-12-06 NOTE — PLAN OF CARE
Problem: Pain:  Goal: Pain level will decrease  Description  Pain level will decrease  Outcome: Ongoing  Note:   Pain 7 on 0-10 pain scale, PRN norco given, pain 5 at reassessment, pain goal 3, encouraged rest, refusing ice     Problem: SAFETY  Goal: Free from accidental physical injury  Outcome: Ongoing  Note:   non skid slippers on. Encouraged to ask for assistance when ambulating. Call light in reach. No falls this shift       Problem: SKIN INTEGRITY  Goal: Skin integrity is maintained or improved  Outcome: Ongoing  Note:   Incision to neck clean, dry, intact, no drainage, no signs of infection     Problem: DISCHARGE BARRIERS  Goal: Patient's continuum of care needs are met  Outcome: Ongoing  Note:   Plans to be discharged home with family when appropriate     Care plan reviewed with patient. Patient verbalize understanding of the plan of care and contribute to goal setting. 26
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
## 2023-12-07 LAB
BACTERIA UR CULT: ABNORMAL
ORGANISM: ABNORMAL

## 2023-12-11 ENCOUNTER — TELEPHONE (OUTPATIENT)
Dept: PHARMACY | Age: 33
End: 2023-12-11

## 2023-12-11 NOTE — TELEPHONE ENCOUNTER
Pharmacy Note  ED Culture Follow-up    Maribeth Taveras is a 28 y.o. female. Allergies: Rocephin [ceftriaxone] and Toradol [ketorolac tromethamine]     Labs:  Lab Results   Component Value Date    BUN 8 06/18/2023    CREATININE 0.8 06/18/2023    WBC 8.9 06/18/2023     Estimated Creatinine Clearance: 139 mL/min (based on SCr of 0.8 mg/dL). Current antimicrobials:   None    ASSESSMENT:  Micro results:   Urine culture: positive for Klebsiella pneumoniae     PLAN:  Need for intervention: Yes  Discussed with: Klever Patel, DO Moses treatment:    Start cephalexin 500 mg TID x 5 days    Patient response:   Call attempt #1, did not reach patient    Called/sent in prescription to: Not applicable    Please call with any questions.  [facility]    Dena Lawn, [facility], PharmD 5:03 PM 12/11/2023

## 2023-12-12 RX ORDER — CEPHALEXIN 500 MG/1
500 CAPSULE ORAL 3 TIMES DAILY
Qty: 15 CAPSULE | Refills: 0 | Status: SHIPPED | OUTPATIENT
Start: 2023-12-12 | End: 2023-12-17

## 2023-12-12 NOTE — TELEPHONE ENCOUNTER
Call attempt #2, did not reach patient. Left message for patient to call back at [phone].     RONNIE Douglas [facility] [place], PharmD 4:52 PM 12/12/2023

## 2023-12-12 NOTE — TELEPHONE ENCOUNTER
Patient returned call. She did tolerate Keflex back in Fubruary of 2023. Prescription for cephalexin sent to [facility] per patient request.  Patient verbalized understanding.

## 2024-01-10 ENCOUNTER — NURSE ONLY (OUTPATIENT)
Dept: LAB | Age: 34
End: 2024-01-10

## 2024-01-12 LAB
C. TRACHOMATIS DNA,THIN PREP: NEGATIVE
N. GONORRHOEAE DNA, THIN PREP: NEGATIVE
SOURCE: NORMAL

## 2024-01-13 LAB
SOURCE: NORMAL
TRICHOMONAS VAGINALI, MOLECULAR: NEGATIVE

## 2024-01-26 LAB — CYTOLOGY THIN PREP PAP: NORMAL

## 2024-02-03 ENCOUNTER — HOSPITAL ENCOUNTER (EMERGENCY)
Age: 34
Discharge: HOME | End: 2024-02-03
Payer: MEDICAID

## 2024-02-03 VITALS
WEIGHT: 284.6 LBS | OXYGEN SATURATION: 99 % | SYSTOLIC BLOOD PRESSURE: 116 MMHG | RESPIRATION RATE: 16 BRPM | TEMPERATURE: 98.5 F | BODY MASS INDEX: 40.84 KG/M2 | HEART RATE: 76 BPM | DIASTOLIC BLOOD PRESSURE: 71 MMHG

## 2024-02-03 DIAGNOSIS — Z11.3 SCREENING EXAMINATION FOR STD (SEXUALLY TRANSMITTED DISEASE): Primary | ICD-10-CM

## 2024-02-03 DIAGNOSIS — Z3A.13 13 WEEKS GESTATION OF PREGNANCY: ICD-10-CM

## 2024-02-03 LAB
BILIRUB UR STRIP.AUTO-MCNC: NEGATIVE MG/DL
CHARACTER UR: CLEAR
CHLAMYDIA TRACHOMATIS BY RT-PCR: NOT DETECTED
COLOR: YELLOW
CT/NG SOURCE: NORMAL
GLUCOSE UR QL STRIP.AUTO: NEGATIVE MG/DL
KETONES UR QL STRIP.AUTO: NEGATIVE
NEISSERIA GONORRHOEAE BY RT-PCR: NOT DETECTED
NITRITE UR QL STRIP.AUTO: NEGATIVE
PH UR STRIP.AUTO: 6 [PH] (ref 5–9)
PROT UR STRIP.AUTO-MCNC: NEGATIVE MG/DL
RBC #/AREA URNS HPF: NEGATIVE /[HPF]
SP GR UR STRIP.AUTO: 1.01 (ref 1–1.03)
T VAGINALIS AG GENITAL QL IA: NEGATIVE
UROBILINOGEN, URINE: 0.2 EU/DL (ref 0.2–1)
WBC #/AREA URNS HPF: ABNORMAL /[HPF]

## 2024-02-03 PROCEDURE — 81003 URINALYSIS AUTO W/O SCOPE: CPT

## 2024-02-03 PROCEDURE — 87205 SMEAR GRAM STAIN: CPT

## 2024-02-03 PROCEDURE — 87077 CULTURE AEROBIC IDENTIFY: CPT

## 2024-02-03 PROCEDURE — 99213 OFFICE O/P EST LOW 20 MIN: CPT | Performed by: NURSE PRACTITIONER

## 2024-02-03 PROCEDURE — 87591 N.GONORRHOEAE DNA AMP PROB: CPT

## 2024-02-03 PROCEDURE — 87086 URINE CULTURE/COLONY COUNT: CPT

## 2024-02-03 PROCEDURE — 99213 OFFICE O/P EST LOW 20 MIN: CPT

## 2024-02-03 PROCEDURE — 87070 CULTURE OTHR SPECIMN AEROBIC: CPT

## 2024-02-03 PROCEDURE — 87491 CHLMYD TRACH DNA AMP PROBE: CPT

## 2024-02-03 PROCEDURE — 87808 TRICHOMONAS ASSAY W/OPTIC: CPT

## 2024-02-03 ASSESSMENT — ENCOUNTER SYMPTOMS
ABDOMINAL PAIN: 0
SHORTNESS OF BREATH: 0
COUGH: 0
VOMITING: 0
DIARRHEA: 0
EYE ITCHING: 0
NAUSEA: 0
EYE REDNESS: 0

## 2024-02-03 ASSESSMENT — PAIN SCALES - GENERAL: PAINLEVEL_OUTOF10: 4

## 2024-02-03 ASSESSMENT — PAIN - FUNCTIONAL ASSESSMENT
PAIN_FUNCTIONAL_ASSESSMENT: ACTIVITIES ARE NOT PREVENTED
PAIN_FUNCTIONAL_ASSESSMENT: 0-10

## 2024-02-03 ASSESSMENT — PAIN DESCRIPTION - LOCATION: LOCATION: ABDOMEN

## 2024-02-03 ASSESSMENT — PAIN DESCRIPTION - FREQUENCY: FREQUENCY: CONTINUOUS

## 2024-02-03 ASSESSMENT — PAIN DESCRIPTION - ORIENTATION: ORIENTATION: LOWER

## 2024-02-03 NOTE — ED TRIAGE NOTES
Rahel arrives to room with complaint of  vaginal itching with lower abd cramping for past 4 days,   pt is 13 weeks pregnant.

## 2024-02-03 NOTE — DISCHARGE INSTRUCTIONS
Patient/patient representative will be contacted upon receipt of final culture and sensitivity.  This can take 2-3 days. Any additions or changes to medications or the plan of care will be made at that time.

## 2024-02-03 NOTE — ED PROVIDER NOTES
[facility] URGENT CARE  UrgentCare Encounter      CHIEFCOMPLAINT       Chief Complaint   Patient presents with    Vaginal Itching       Nurses Notes reviewed and I agree except as noted in the HPI.  HISTORY OF PRESENT ILLNESS   Rahel Blount is a 33 y.o. female who presents to the urgent care for evaluation.     She presents for evaluation of a possible due to itching and vaginal irritation that started about 4 days ago.  She is currently 13 weeks pregnant with her fifth child.  She has had 4 other pregnancies.  She has not yet contacted her OB/GYN to notify them due to work constraints.    The patient/patient representative has no other acute complaints at this time.    REVIEW OF SYSTEMS     Review of Systems   Constitutional:  Negative for chills, fatigue and fever.   Eyes:  Negative for redness and itching.   Respiratory:  Negative for cough and shortness of breath.    Cardiovascular:  Negative for chest pain.   Gastrointestinal:  Negative for abdominal pain, diarrhea, nausea and vomiting.   Genitourinary:  Positive for dysuria (from itching and irritation) and vaginal discharge. Negative for flank pain, frequency, hematuria and urgency.   Skin:  Negative for rash.   Allergic/Immunologic: Negative for environmental allergies and food allergies.       PAST MEDICAL HISTORY         Diagnosis Date    Anxiety disorder     history of anxiety, COVID related was on meds, not on meds anymore    Asthma     PRN inhaler    Nicotine dependence        SURGICAL HISTORY     Patient  has a past surgical history that includes  section and thyroglossal duct excision (N/A, 2019).    CURRENT MEDICATIONS       Previous Medications    ACETAMINOPHEN (TYLENOL) 500 MG TABLET    Take 1,000 mg by mouth every 6 hours as needed for Pain    ALBUTEROL (PROVENTIL) (2.5 MG/3ML) 0.083% NEBULIZER SOLUTION    Take 3 mLs by nebulization every 6 hours as needed for Wheezing    ALBUTEROL SULFATE HFA (PROVENTIL HFA) 108

## 2024-02-04 LAB
BACTERIA UR CULT: NORMAL
GRAM STN GENITAL: NORMAL

## 2024-02-05 LAB — BACTERIA UR CULT: NORMAL

## 2024-02-06 LAB
BACTERIA GENITAL AEROBE CULT: ABNORMAL
BACTERIA GENITAL AEROBE CULT: ABNORMAL
GRAM STN GENITAL: ABNORMAL
ORGANISM: ABNORMAL

## 2024-02-12 ENCOUNTER — HOSPITAL ENCOUNTER (EMERGENCY)
Age: 34
Discharge: HOME | End: 2024-02-12
Payer: MEDICAID

## 2024-02-12 VITALS
RESPIRATION RATE: 16 BRPM | SYSTOLIC BLOOD PRESSURE: 141 MMHG | DIASTOLIC BLOOD PRESSURE: 59 MMHG | HEART RATE: 89 BPM | BODY MASS INDEX: 39.99 KG/M2 | OXYGEN SATURATION: 100 % | TEMPERATURE: 99 F | WEIGHT: 270 LBS | HEIGHT: 69 IN

## 2024-02-12 DIAGNOSIS — Z3A.14 14 WEEKS GESTATION OF PREGNANCY: ICD-10-CM

## 2024-02-12 DIAGNOSIS — K52.9 GASTROENTERITIS: Primary | ICD-10-CM

## 2024-02-12 DIAGNOSIS — R52 BODY ACHES: ICD-10-CM

## 2024-02-12 LAB
BILIRUB UR QL STRIP.AUTO: NEGATIVE
CHARACTER UR: CLEAR
COLOR: YELLOW
FLUAV RNA RESP QL NAA+PROBE: NOT DETECTED
FLUBV RNA RESP QL NAA+PROBE: NOT DETECTED
GLUCOSE UR QL STRIP.AUTO: NEGATIVE MG/DL
HGB UR QL STRIP.AUTO: NEGATIVE
KETONES UR QL STRIP.AUTO: NEGATIVE
NITRITE UR QL STRIP: NEGATIVE
PH UR STRIP.AUTO: 7 [PH] (ref 5–9)
PROT UR STRIP.AUTO-MCNC: NEGATIVE MG/DL
SARS-COV-2 RNA RESP QL NAA+PROBE: NOT DETECTED
SP GR UR REFRACT.AUTO: 1.01 (ref 1–1.03)
UROBILINOGEN, URINE: 0.2 EU/DL (ref 0–1)
WBC #/AREA URNS HPF: NEGATIVE /[HPF]

## 2024-02-12 PROCEDURE — 99283 EMERGENCY DEPT VISIT LOW MDM: CPT

## 2024-02-12 PROCEDURE — 87636 SARSCOV2 & INF A&B AMP PRB: CPT

## 2024-02-12 PROCEDURE — 81003 URINALYSIS AUTO W/O SCOPE: CPT

## 2024-02-12 PROCEDURE — 6370000000 HC RX 637 (ALT 250 FOR IP): Performed by: PHYSICIAN ASSISTANT

## 2024-02-12 RX ORDER — ONDANSETRON 4 MG/1
4 TABLET, ORALLY DISINTEGRATING ORAL EVERY 8 HOURS PRN
Qty: 20 TABLET | Refills: 0 | Status: SHIPPED | OUTPATIENT
Start: 2024-02-12

## 2024-02-12 RX ORDER — ACETAMINOPHEN 500 MG
1000 TABLET ORAL ONCE
Status: COMPLETED | OUTPATIENT
Start: 2024-02-12 | End: 2024-02-12

## 2024-02-12 RX ADMIN — ACETAMINOPHEN 1000 MG: 500 TABLET ORAL at 19:31

## 2024-02-12 NOTE — ED TRIAGE NOTES
Pt presents to ED with chief complaint of nausea and body aches. Pt states she started feeling ill yesterday. States she has diarrhea, body aches, nausea, and chills. Pt 14 weeks pregnant. States has been taking zofran at home.

## 2024-02-13 NOTE — ED PROVIDER NOTES
gestation of pregnancy        PATIENT REFERRED TO:  [facility]  [address]  [facility] [address]  [phone]  Schedule an appointment as soon as possible for a visit   For recheck of illness    Nisreen Bland MD  [address]  [place] [address]  [phone]      For any concerns of your pregnancy      DISCHARGE MEDICATIONS:  New Prescriptions    ONDANSETRON (ZOFRAN-ODT) 4 MG DISINTEGRATING TABLET    Take 1 tablet by mouth every 8 hours as needed for Nausea or Vomiting       (Please note that portions of this note were completed with a voice recognition program.  Efforts were made to edit the dictations but occasionally words are mis-transcribed.)    Debbie Parikh PA-C 02/12/24 8:10 PM    MCKAYLA Champion Jennifer, PA-C  02/12/24 2015

## 2024-02-15 ENCOUNTER — OFFICE VISIT (OUTPATIENT)
Dept: INTERNAL MEDICINE CLINIC | Age: 34
End: 2024-02-15
Payer: MEDICAID

## 2024-02-15 VITALS
DIASTOLIC BLOOD PRESSURE: 68 MMHG | TEMPERATURE: 98.1 F | BODY MASS INDEX: 41.12 KG/M2 | HEIGHT: 70 IN | HEART RATE: 80 BPM | WEIGHT: 287.2 LBS | SYSTOLIC BLOOD PRESSURE: 112 MMHG

## 2024-02-15 DIAGNOSIS — Z76.89 ESTABLISHING CARE WITH NEW DOCTOR, ENCOUNTER FOR: ICD-10-CM

## 2024-02-15 DIAGNOSIS — K52.9 GASTROENTERITIS: ICD-10-CM

## 2024-02-15 DIAGNOSIS — K21.9 GASTROESOPHAGEAL REFLUX DISEASE, UNSPECIFIED WHETHER ESOPHAGITIS PRESENT: ICD-10-CM

## 2024-02-15 DIAGNOSIS — J45.20 MILD INTERMITTENT ASTHMA WITHOUT COMPLICATION: Primary | ICD-10-CM

## 2024-02-15 DIAGNOSIS — Z3A.14 14 WEEKS GESTATION OF PREGNANCY: ICD-10-CM

## 2024-02-15 PROCEDURE — 99204 OFFICE O/P NEW MOD 45 MIN: CPT

## 2024-02-15 RX ORDER — ALBUTEROL SULFATE 2.5 MG/3ML
2.5 SOLUTION RESPIRATORY (INHALATION) 4 TIMES DAILY PRN
Qty: 120 EACH | Refills: 3 | Status: SHIPPED | OUTPATIENT
Start: 2024-02-15

## 2024-02-15 SDOH — ECONOMIC STABILITY: HOUSING INSECURITY
IN THE LAST 12 MONTHS, WAS THERE A TIME WHEN YOU DID NOT HAVE A STEADY PLACE TO SLEEP OR SLEPT IN A SHELTER (INCLUDING NOW)?: NO

## 2024-02-15 SDOH — HEALTH STABILITY: PHYSICAL HEALTH: ON AVERAGE, HOW MANY DAYS PER WEEK DO YOU ENGAGE IN MODERATE TO STRENUOUS EXERCISE (LIKE A BRISK WALK)?: 1 DAY

## 2024-02-15 SDOH — ECONOMIC STABILITY: FOOD INSECURITY: WITHIN THE PAST 12 MONTHS, YOU WORRIED THAT YOUR FOOD WOULD RUN OUT BEFORE YOU GOT MONEY TO BUY MORE.: NEVER TRUE

## 2024-02-15 SDOH — ECONOMIC STABILITY: INCOME INSECURITY: HOW HARD IS IT FOR YOU TO PAY FOR THE VERY BASICS LIKE FOOD, HOUSING, MEDICAL CARE, AND HEATING?: NOT HARD AT ALL

## 2024-02-15 SDOH — ECONOMIC STABILITY: FOOD INSECURITY: WITHIN THE PAST 12 MONTHS, THE FOOD YOU BOUGHT JUST DIDN'T LAST AND YOU DIDN'T HAVE MONEY TO GET MORE.: NEVER TRUE

## 2024-02-15 ASSESSMENT — PATIENT HEALTH QUESTIONNAIRE - PHQ9
SUM OF ALL RESPONSES TO PHQ QUESTIONS 1-9: 0
2. FEELING DOWN, DEPRESSED OR HOPELESS: 0
SUM OF ALL RESPONSES TO PHQ9 QUESTIONS 1 & 2: 0
SUM OF ALL RESPONSES TO PHQ QUESTIONS 1-9: 0
1. LITTLE INTEREST OR PLEASURE IN DOING THINGS: 0
SUM OF ALL RESPONSES TO PHQ QUESTIONS 1-9: 0
SUM OF ALL RESPONSES TO PHQ QUESTIONS 1-9: 0

## 2024-02-15 NOTE — PROGRESS NOTES
in the Last Year: Never true   Transportation Needs: Unknown (2/15/2024)    PRAPARE - Transportation     Lack of Transportation (Medical): Not on file     Lack of Transportation (Non-Medical): No   Physical Activity: Unknown (2/15/2024)    Exercise Vital Sign     Days of Exercise per Week: 1 day     Minutes of Exercise per Session: Not on file   Stress: Not on file   Social Connections: Not on file   Intimate Partner Violence: Not on file   Housing Stability: Unknown (2/15/2024)    Housing Stability Vital Sign     Unable to Pay for Housing in the Last Year: Not on file     Number of Places Lived in the Last Year: Not on file     Unstable Housing in the Last Year: No     Family History   Problem Relation Age of Onset    Diabetes Mother     High Blood Pressure Mother     Other Father         Sickle Cell Trait    Asthma Sister     Asthma Brother      Review of Systems - General ROS: negative for - chills or fever  Psychological ROS: negative for - anxiety and depression  Hematological and Lymphatic ROS: No history of blood clots or bleeding disorder.   Respiratory ROS: no cough, shortness of breath, or wheezing  Cardiovascular ROS: no chest pain or dyspnea on exertion, tolerates a flight of stairs, vacuuming  Gastrointestinal ROS: no abdominal pain, change in bowel habits, or black or bloody stools  Genito-Urinary ROS: no dysuria, trouble voiding, or hematuria  Musculoskeletal ROS: negative for - muscle pain or muscular weakness  Neurological ROS: negative for - headaches, numbness/tingling, seizures or weakness  Dermatological ROS: negative for - rash or skin lesion changes    Blood pressure 112/68, pulse 80, temperature 98.1 °F (36.7 °C), height 1.778 m (5' 10\"), weight 130.3 kg (287 lb 3.2 oz), last menstrual period 11/01/2023, unknown if currently breastfeeding.    Physical Examination: General appearance -alert, obese, well appearing, and in no distress. On room air, no respiratory distress  Mental status - alert,

## 2024-03-09 ENCOUNTER — HOSPITAL ENCOUNTER (EMERGENCY)
Age: 34
Discharge: HOME | End: 2024-03-09
Payer: MEDICAID

## 2024-03-09 VITALS
OXYGEN SATURATION: 100 % | SYSTOLIC BLOOD PRESSURE: 127 MMHG | HEART RATE: 81 BPM | TEMPERATURE: 97.4 F | RESPIRATION RATE: 16 BRPM | DIASTOLIC BLOOD PRESSURE: 78 MMHG

## 2024-03-09 DIAGNOSIS — B34.9 VIRAL SYNDROME: Primary | ICD-10-CM

## 2024-03-09 DIAGNOSIS — J11.1 INFLUENZA-LIKE ILLNESS: ICD-10-CM

## 2024-03-09 DIAGNOSIS — Z20.828 EXPOSURE TO INFLUENZA: ICD-10-CM

## 2024-03-09 PROCEDURE — 99213 OFFICE O/P EST LOW 20 MIN: CPT

## 2024-03-09 PROCEDURE — 99212 OFFICE O/P EST SF 10 MIN: CPT

## 2024-03-09 NOTE — ED NOTES
To [facility] with complaints of congestion, headache, cough. Started around Wednesday. Exposed to Influenza from grandmother. Whole family being seen for similar.     Whitley Farr, MILIND  03/09/24 2213

## 2024-03-09 NOTE — DISCHARGE INSTRUCTIONS
Hydrate well keeping urine clear/pale yellow and get plenty of rest.  This should expedite the severity/resolution of your viral symptoms.    Please practice good hand hygiene and minimize your contact with others, viral illnesses are very contagious.    Okay to use Tylenol for any body aches/pains.  Do not use Motrin as it is not indicated during pregnancy.    See your OB/GYN for any spotting/vaginal bleeding/discharge or any lower back pain.    Return to ER/urgent care if you are having any other chest pains/shortness of breath or any other urgent/emergent medical concerns.    Hope you are feeling better soon!

## 2024-03-09 NOTE — ED PROVIDER NOTES
[facility] URGENT CARE  Urgent Care Encounter     CHIEF COMPLAINT       Chief Complaint   Patient presents with    Cough    Headache     HISTORY OF PRESENT ILLNESS   Rahel Blount is a 33 y.o. female who presents to urgent care for congestion, cough fever/chills.  Started Wednesday.  Admits to history of the symptoms when she was suffering from viral illness.  Has multiple family members that accompany her today with similar symptoms.  Known close contacts are positive for influenza A.  Pertinent medical history includes asthma, hoping symptoms with albuterol inhalers which is helping.  Denies decreased immune system.  Admits to having historical COVID infection but denies her current symptoms being similar.  Denies COVID vaccination.  Denies having annual flu vaccination.    Denies chest pain shortness of breath.  Denies nausea, states her nausea from her pregnancy has subsided    She is 18 weeks pregnant.  Denies any back pain, spotting or vaginal discharge.  Denies any signs of spontaneous miscarriage    URGENT CARE TIMELINE       PAST MEDICAL HISTORY         Diagnosis Date    Anxiety disorder     history of anxiety, COVID related was on meds, not on meds anymore    Asthma     PRN inhaler    Nicotine dependence      SURGICALHISTORY     Patient  has a past surgical history that includes  section and thyroglossal duct excision (N/A, 2019).  CURRENT MEDICATIONS       Previous Medications    ACETAMINOPHEN (TYLENOL) 500 MG TABLET    Take 2 tablets by mouth every 6 hours as needed for Pain    ALBUTEROL (PROVENTIL) (2.5 MG/3ML) 0.083% NEBULIZER SOLUTION    Take 3 mLs by nebulization every 6 hours as needed for Wheezing    ALBUTEROL (PROVENTIL) (2.5 MG/3ML) 0.083% NEBULIZER SOLUTION    Take 3 mLs by nebulization 4 times daily as needed for Wheezing    ONDANSETRON (ZOFRAN-ODT) 4 MG DISINTEGRATING TABLET    Take 1 tablet by mouth every 8 hours as needed for Nausea or Vomiting    PRENATAL

## 2024-04-08 ENCOUNTER — HOSPITAL ENCOUNTER (EMERGENCY)
Age: 34
Discharge: HOME | End: 2024-04-08
Payer: MEDICAID

## 2024-04-08 VITALS
RESPIRATION RATE: 18 BRPM | SYSTOLIC BLOOD PRESSURE: 126 MMHG | TEMPERATURE: 97.7 F | WEIGHT: 270 LBS | BODY MASS INDEX: 38.74 KG/M2 | DIASTOLIC BLOOD PRESSURE: 63 MMHG | OXYGEN SATURATION: 100 % | HEART RATE: 85 BPM

## 2024-04-08 DIAGNOSIS — R51.9 ACUTE NONINTRACTABLE HEADACHE, UNSPECIFIED HEADACHE TYPE: ICD-10-CM

## 2024-04-08 DIAGNOSIS — Z3A.22 22 WEEKS GESTATION OF PREGNANCY: Primary | ICD-10-CM

## 2024-04-08 LAB
BILIRUB UR QL STRIP.AUTO: NEGATIVE
CHARACTER UR: CLEAR
COLOR: YELLOW
FLUAV RNA RESP QL NAA+PROBE: NOT DETECTED
FLUBV RNA RESP QL NAA+PROBE: NOT DETECTED
GLUCOSE UR QL STRIP.AUTO: NEGATIVE MG/DL
HGB UR QL STRIP.AUTO: NEGATIVE
KETONES UR QL STRIP.AUTO: NEGATIVE
NITRITE UR QL STRIP: NEGATIVE
PH UR STRIP.AUTO: 7 [PH] (ref 5–9)
PROT UR STRIP.AUTO-MCNC: NEGATIVE MG/DL
SARS-COV-2 RNA RESP QL NAA+PROBE: NOT DETECTED
SP GR UR REFRACT.AUTO: < 1.005 (ref 1–1.03)
UROBILINOGEN, URINE: 0.2 EU/DL (ref 0–1)
WBC #/AREA URNS HPF: NEGATIVE /[HPF]

## 2024-04-08 PROCEDURE — 99284 EMERGENCY DEPT VISIT MOD MDM: CPT

## 2024-04-08 PROCEDURE — 87636 SARSCOV2 & INF A&B AMP PRB: CPT

## 2024-04-08 PROCEDURE — 6370000000 HC RX 637 (ALT 250 FOR IP): Performed by: NURSE PRACTITIONER

## 2024-04-08 PROCEDURE — 2580000003 HC RX 258: Performed by: NURSE PRACTITIONER

## 2024-04-08 PROCEDURE — 81003 URINALYSIS AUTO W/O SCOPE: CPT

## 2024-04-08 RX ORDER — ACETAMINOPHEN 325 MG/1
650 TABLET ORAL ONCE
Status: COMPLETED | OUTPATIENT
Start: 2024-04-08 | End: 2024-04-08

## 2024-04-08 RX ORDER — 0.9 % SODIUM CHLORIDE 0.9 %
1000 INTRAVENOUS SOLUTION INTRAVENOUS ONCE
Status: COMPLETED | OUTPATIENT
Start: 2024-04-08 | End: 2024-04-08

## 2024-04-08 RX ADMIN — ACETAMINOPHEN 650 MG: 325 TABLET ORAL at 15:03

## 2024-04-08 RX ADMIN — SODIUM CHLORIDE 1000 ML: 9 INJECTION, SOLUTION INTRAVENOUS at 15:04

## 2024-04-08 ASSESSMENT — PAIN DESCRIPTION - LOCATION: LOCATION: HEAD

## 2024-04-08 ASSESSMENT — PAIN - FUNCTIONAL ASSESSMENT: PAIN_FUNCTIONAL_ASSESSMENT: 0-10

## 2024-04-08 NOTE — ED TRIAGE NOTES
Patient presents to ED from home with report of a headache and generalized fatigue as of last week. Patient reports she is 22 weeks pregnant at this time and denies any N/V/D. Covid / flu swab sent at this time. Patient is ambulatory and A/O x4 on arrival.

## 2024-04-08 NOTE — ED PROVIDER NOTES
care were discussed with the patient and present family who expressed understanding and agreement with the POC. Any medications were reviewed and indications and risks of medications were discussed with the patient /family present. Strict verbal and written return precautions, instructions and appropriate follow-up provided to  the patient.   Patient was DISCHARGED from the hospital. Based on the reassuring ED workup and patient's stable vital signs, I feel the patient may be safely discharged home. At this point in time, I believe the patient has the mental capacity to make medical decisions.      No notes of EC Admission Criteria type on file.        Patient was seen independently by myself. The patient's final impression and disposition and plan was determined by myself.     Strict return precautions and follow up instructions were discussed with the patient prior to discharge, with which the patient agrees.    Physical assessment findings, diagnostic testing(s) if applicable, and vital signs reviewed with patient/patient representative.  Questions answered.   Medications asdirected, including OTC medications for supportive care.   Education provided on medications.  Differential diagnosis(s) discussed with patient/patient representative.  Home care/self care instructions reviewed withpatient/patient representative.  Patient is to follow-up with family care provider in 2-3 days if no improvement.  Patient is to go to the emergency department if symptoms worsen.  Patient/patient representative isaware of care plan, questions answered, verbalizes understanding and is in agreement.     ED Medications administered this visit:  (None if blank)  Medications   sodium chloride 0.9 % bolus 1,000 mL (0 mLs IntraVENous Stopped 4/8/24 1556)   acetaminophen (TYLENOL) tablet 650 mg (650 mg Oral Given 4/8/24 1503)         CONSULTS:  OB/GYNDR. Almanzar    PROCEDURES: (None if blank)  Procedures:     CRITICAL CARE: (None if

## 2024-04-11 ENCOUNTER — HOSPITAL ENCOUNTER (OUTPATIENT)
Age: 34
Discharge: HOME | End: 2024-04-12
Attending: OBSTETRICS & GYNECOLOGY | Admitting: OBSTETRICS & GYNECOLOGY
Payer: MEDICAID

## 2024-04-11 VITALS
DIASTOLIC BLOOD PRESSURE: 55 MMHG | OXYGEN SATURATION: 98 % | BODY MASS INDEX: 39.99 KG/M2 | TEMPERATURE: 96.3 F | WEIGHT: 270 LBS | HEART RATE: 75 BPM | RESPIRATION RATE: 16 BRPM | SYSTOLIC BLOOD PRESSURE: 121 MMHG | HEIGHT: 69 IN

## 2024-04-11 PROBLEM — R51.9 HEADACHE: Status: ACTIVE | Noted: 2024-04-11

## 2024-04-11 PROCEDURE — 96372 THER/PROPH/DIAG INJ SC/IM: CPT

## 2024-04-11 PROCEDURE — 6360000002 HC RX W HCPCS: Performed by: OBSTETRICS & GYNECOLOGY

## 2024-04-11 RX ORDER — MEPERIDINE HYDROCHLORIDE 50 MG/ML
50 INJECTION INTRAMUSCULAR; INTRAVENOUS; SUBCUTANEOUS ONCE
Status: COMPLETED | OUTPATIENT
Start: 2024-04-11 | End: 2024-04-11

## 2024-04-11 RX ORDER — PROMETHAZINE HYDROCHLORIDE 25 MG/ML
50 INJECTION, SOLUTION INTRAMUSCULAR; INTRAVENOUS ONCE
Status: COMPLETED | OUTPATIENT
Start: 2024-04-11 | End: 2024-04-11

## 2024-04-11 RX ADMIN — MEPERIDINE HYDROCHLORIDE 50 MG: 50 INJECTION, SOLUTION INTRAMUSCULAR; INTRAVENOUS; SUBCUTANEOUS at 23:55

## 2024-04-11 RX ADMIN — PROMETHAZINE HYDROCHLORIDE 50 MG: 25 INJECTION INTRAMUSCULAR; INTRAVENOUS at 23:55

## 2024-04-11 ASSESSMENT — PAIN DESCRIPTION - LOCATION: LOCATION: HEAD

## 2024-04-11 ASSESSMENT — PAIN SCALES - GENERAL: PAINLEVEL_OUTOF10: 7

## 2024-04-12 NOTE — FLOWSHEET NOTE
23wk1d pt here with c/o headache, and also having blurred vision. Pt also states she is having cramping and has noticed and increase in swelling in her lower legs. Pt rates headache a 7/10. Pt states she has not taken anything for pain. Pt states she has had 4 bottles of water today. Pt denies any history of chronic HTN or Preeclampsia with her previous pregnancies. Pt to the bathroom to change into gown.

## 2024-04-12 NOTE — FLOWSHEET NOTE
Discharge instructions given and reviewed with patient.  Informed patient to notify physican or come back to L & D if leaking fluid from vagina with or without contractions.  Bright red vaginal bleeding occurs that is as heavy as or heavier than a period.  Regular contractions that are 2-5 minutes apart that are longer, stronger, and closer together.  Decreased fetal movement.  Elevated temperature >100.5°F and chills.  Blurred vision, spots before eyes, unrelievable headache, severe facial swelling, or upper abdominal pain. Pt instructed she may need to seek the optometrist for vision changes and headaches. Questions denied, papers signed.  Pt ambulated off unit by herself in stable condition to exit where ride is waiting for her.

## 2024-04-12 NOTE — DISCHARGE INSTRUCTIONS
Home Undelivered Discharge Instructions    After Discharge Orders:           Diet: regular diet   Increase fluid intake to 8-10 glasses of water daily    Rest: normal activity as tolerated    Other instructions: Do kick counts once a day on your baby. Choose the time of day your baby is most active. Get in a comfortable lying or sitting position and time how long it takes to feel 10 kicks, twists, turns, swishes, or rolls.     Call Your Doctor if Any of the Following Occurs   Leaking fluid from vagina with or without contractions  Bright red vaginal bleeding occurs that is as heavy as or heavier than a period  Regular contractions are longer, stronger, and closer together  Noticeable decreased fetal movement  Elevated temperature >100.5°F and chills  Blurred vision, spots before eyes, unrelievable headache, severe facial swelling, or upper abdominal pain  Contact physician or hospital OB unit if signs of premature labor occur at ?36 weeks  Persistent or rhythmic low back pain that feels different than you are used to  Menstrual like cramps  Intestinal cramps with or without diarrhea  Pelvic pressure or rhythmic tightening that feels different than you are used to  Watery discharge or a gush of fluid from your vagina  Vaginal bleeding as heavy as a period  General Information     Difference between:  False Labor True Labor   1. Contractions often are irregular and don't consistently get closer together (called Elvin-Nuno contractions) 1. Contractions come at regular intervals and, as time goes on, get closer together.   2. Contractions may often stop when you rest or with a position change. 2. Contractions continue despite movement or walking. Contractions get stronger and closer together with time.   3. Often felt in the lower abdomen. 3. Usually felt in back coming around to the front.     Reminder to Patient   Please bring all teaching sheets and discharge information with you if you return to the hospital or

## 2024-04-12 NOTE — FLOWSHEET NOTE
Dr Avila paged to call for update. Call returned and notified pt states her headache and cramping is gone. Pt states she still has some blurred vision. Pt does not want to be evaluated in the ED and otherwise is ready to go home. Orders received.

## 2024-04-12 NOTE — FLOWSHEET NOTE
Dr Avila returned call to unit. Updated on pt's arrival and complaints. Pt states she has had a headache for 2 weeks with no relief from tylenol. Pt rates headache 7/10. Pt was seen in the ED 3 days ago for same complaints and was treated with IV fluid. BP's reviwed. FHT via doppler 137 bpm. No contractions noted. SVE closed. Pt has generalized swelling that is non pitting. Pt denies any history of elevated blood pressures or preeclampsia. Orders received.

## 2024-04-22 ENCOUNTER — HOSPITAL ENCOUNTER (EMERGENCY)
Age: 34
Discharge: HOME | End: 2024-04-22
Payer: MEDICAID

## 2024-04-22 VITALS
RESPIRATION RATE: 16 BRPM | HEIGHT: 69 IN | BODY MASS INDEX: 42.21 KG/M2 | TEMPERATURE: 98 F | OXYGEN SATURATION: 98 % | WEIGHT: 285 LBS | HEART RATE: 95 BPM

## 2024-04-22 DIAGNOSIS — R05.1 ACUTE COUGH: Primary | ICD-10-CM

## 2024-04-22 DIAGNOSIS — J02.9 ACUTE PHARYNGITIS, UNSPECIFIED ETIOLOGY: ICD-10-CM

## 2024-04-22 DIAGNOSIS — R09.89 CHEST CONGESTION: ICD-10-CM

## 2024-04-22 PROCEDURE — 99213 OFFICE O/P EST LOW 20 MIN: CPT

## 2024-04-22 RX ORDER — LORATADINE 10 MG
1 CAPSULE ORAL EVERY 6 HOURS PRN
Qty: 168 CAPSULE | Refills: 0 | Status: ON HOLD | OUTPATIENT
Start: 2024-04-22 | End: 2024-05-10

## 2024-04-22 ASSESSMENT — PAIN - FUNCTIONAL ASSESSMENT: PAIN_FUNCTIONAL_ASSESSMENT: NONE - DENIES PAIN

## 2024-04-22 NOTE — ED PROVIDER NOTES
[facility] URGENT CARE      URGENT CARE     Pt Name: Rahel Blount  MRN: [id number]  Birthdate 1990  Date of evaluation: 2024  Provider: ABHAY Coker CNP    Urgent Care Encounter     CHIEF COMPLAINT       Chief Complaint   Patient presents with    Cough    Pharyngitis    Nasal Congestion     HISTORY OF PRESENT ILLNESS   Rahel Blount is a 33 y.o. female who presents to urgent care with chief complaint of cough/congestion and sore throat.  Accompanied by several family members that have the same symptoms.  Symptoms started Friday.  Treating with breathing treatments states helping with cough/congestion.  Denies any reactions.  States she is currently pregnant, 24 weeks.  Significant medical history of asthma, anxiety and sickle cell trait.    History obtained from patient's mother  URGENT CARE TIMELINE      ED Course as of 24 1423   Mon 2024   1404 Temp: 98 °F (36.7 °C)  Vital signs are stable.  Afebrile.  Oxygenating well. [JR]      ED Course User Index  [JR] Salvador Reynolds APRN - CNP     PAST MEDICAL HISTORY         Diagnosis Date    Anxiety disorder     history of anxiety, COVID related was on meds, not on meds anymore    Asthma     PRN inhaler    Disease of blood and blood forming organ     sickle cell trait    Nicotine dependence      SURGICALHISTORY     Patient  has a past surgical history that includes  section and thyroglossal duct excision (N/A, 2019).  CURRENT MEDICATIONS       Previous Medications    ACETAMINOPHEN (TYLENOL) 500 MG TABLET    Take 2 tablets by mouth every 6 hours as needed for Pain    ALBUTEROL (PROVENTIL) (2.5 MG/3ML) 0.083% NEBULIZER SOLUTION    Take 3 mLs by nebulization every 6 hours as needed for Wheezing    ALBUTEROL (PROVENTIL) (2.5 MG/3ML) 0.083% NEBULIZER SOLUTION    Take 3 mLs by nebulization 4 times daily as needed for Wheezing    ONDANSETRON (ZOFRAN-ODT) 4 MG DISINTEGRATING TABLET    Take 1 tablet

## 2024-04-22 NOTE — DISCHARGE INSTR - COC
IV ACCESS:462089756}    Nursing Mobility/ADLs:  Walking   {CHP DME ADLs:013043926}  Transfer  {CHP DME ADLs:857380875}  Bathing  {CHP DME ADLs:643459402}  Dressing  {CHP DME ADLs:927382079}  Toileting  {CHP DME ADLs:823191360}  Feeding  {CHP DME ADLs:044932183}  Med Admin  {CHP DME ADLs:479853499}  Med Delivery   { MECHELLE MED Delivery:374766006}    Wound Care Documentation and Therapy:  Incision 19 Neck (Active)   Number of days: 1805        Elimination:  Continence:   Bowel: {YES / NO:}  Bladder: {YES / NO:}  Urinary Catheter: {Urinary Catheter:923653588}   Colostomy/Ileostomy/Ileal Conduit: {YES / NO:}       Date of Last BM: ***  No intake or output data in the 24 hours ending 24 1425  No intake/output data recorded.    Safety Concerns:     { MECHELLE Safety Concerns:380559616}    Impairments/Disabilities:      { MECHELLE Impairments/Disabilities:202839313}    Nutrition Therapy:  Current Nutrition Therapy:   { MECHELLE Diet List:109498355}    Routes of Feeding: {P DME Other Feedings:730065984}  Liquids: {Slp liquid thickness:79354}  Daily Fluid Restriction: {CHP DME Yes amt example:491727407}  Last Modified Barium Swallow with Video (Video Swallowing Test): {Done Not Done Date:}    Treatments at the Time of Hospital Discharge:   Respiratory Treatments: ***  Oxygen Therapy:  {Therapy; copd oxygen:97935}  Ventilator:    { CC Vent List:449005077}    Rehab Therapies: {THERAPEUTIC INTERVENTION:7650829325}  Weight Bearing Status/Restrictions: {[facility] Weight Bearin}  Other Medical Equipment (for information only, NOT a DME order):  {EQUIPMENT:116749269}  Other Treatments: ***    Patient's personal belongings (please select all that are sent with patient):  {P DME Belongings:961559986}    RN SIGNATURE:  {Esignature:829158954}    CASE MANAGEMENT/SOCIAL WORK SECTION    Inpatient Status Date: ***    Readmission Risk Assessment Score:  Readmission Risk              Risk of Unplanned

## 2024-04-22 NOTE — ED NOTES
Pt complains of sore throat, cough and congestion since Friday. States she is currently pregnant.     Brea King, MILIND  04/22/24 7978

## 2024-05-10 PROBLEM — O47.9 BRAXTON HICK'S CONTRACTION: Status: ACTIVE | Noted: 2024-05-10

## 2024-07-26 ENCOUNTER — HOSPITAL ENCOUNTER (OUTPATIENT)
Age: 34
Discharge: HOME | End: 2024-07-26
Attending: OBSTETRICS & GYNECOLOGY | Admitting: OBSTETRICS & GYNECOLOGY
Payer: MEDICAID

## 2024-07-26 VITALS
BODY MASS INDEX: 41.47 KG/M2 | WEIGHT: 280 LBS | RESPIRATION RATE: 18 BRPM | OXYGEN SATURATION: 98 % | HEART RATE: 71 BPM | DIASTOLIC BLOOD PRESSURE: 63 MMHG | SYSTOLIC BLOOD PRESSURE: 136 MMHG | HEIGHT: 69 IN | TEMPERATURE: 97.5 F

## 2024-07-26 PROBLEM — O26.90 PREGNANCY WITH COMPLICATION: Status: ACTIVE | Noted: 2024-07-26

## 2024-07-26 LAB
BILIRUB UR QL STRIP.AUTO: NEGATIVE
CHARACTER UR: CLEAR
COLOR, UA: YELLOW
GLUCOSE UR QL STRIP.AUTO: NEGATIVE MG/DL
HGB UR QL STRIP.AUTO: NEGATIVE
KETONES UR QL STRIP.AUTO: NEGATIVE
NITRITE UR QL STRIP: NEGATIVE
PH UR STRIP.AUTO: 6 [PH] (ref 5–9)
PROT UR STRIP.AUTO-MCNC: NEGATIVE MG/DL
SP GR UR REFRACT.AUTO: 1.01 (ref 1–1.03)
UROBILINOGEN, URINE: 0.2 EU/DL (ref 0–1)
WBC #/AREA URNS HPF: NEGATIVE /[HPF]

## 2024-07-26 PROCEDURE — 81003 URINALYSIS AUTO W/O SCOPE: CPT

## 2024-07-26 NOTE — PROGRESS NOTES
Department of Obstetrics and Gynecology  Labor and Delivery   Triage Note      Pt Name: Rahel Blount  MRN: [id number] Acct #: [id number]  YOB: 1990  Procedure Performed By: Seb Miller MD, MD        SUBJECTIVE:   Rahel Blount is a 34 y.o. female  at 38 weeks and 2 days, came in because of contractions that she felt around 1 pm for 2 hours after she was up and about at welfare.   She denies any decreased fetal movement, vaginal bleeding,or leaking of fluid.    Obstetrical records reviewed  History of 4 previous c/s, repeat scheduled at on 24      Review of systems negative except for HPI      OBJECTIVE    Vitals:  /63   Pulse 71   Temp 97.5 °F (36.4 °C) (Temporal)   Resp 18   Ht 1.753 m (5' 9\")   Wt 127 kg (280 lb)   LMP 2023   SpO2 98%   BMI 41.35 kg/m²     CONSTITUTIONAL:  awake, alert, cooperative, no apparent distress, and appears stated age  ABDOMEN:  scars of previous c/s noted, normal bowel sounds, soft, non-distended, non-tender, no masses palpated, no hepatosplenomegaly. Uterus is gravid, soft and non-tender  GENITAL/URINARY:  External Genitalia:  General appearance; normal, Hair distribution; normal, Lesions absent    Cervix:             Dilation:  Closed         Effacement:  Long         Station:  -3 cm         Consistency:  medium         Position:  posterior      Membranes:  Intact    NST is reactive    Fetal heart rate:         Baseline Heart Rate:  120-13's       Accelerations:  present       Decelerations:  none       Variability:  moderate    Contraction frequency: occasional, rare contractions, but not felt by patient     Latest Reference Range & Units 24 17:34   Color, UA STRAW-YELLOW  YELLOW   Glucose, Ur NEGATIVE mg/dl NEGATIVE   Bilirubin, Urine NEGATIVE  NEGATIVE   Ketones, Urine NEGATIVE  NEGATIVE   Specific Gravity, UA 1.002 - 1.030  1.007   Blood, Urine NEGATIVE  NEGATIVE   Protein, UA NEGATIVE  NEGATIVE   Urobilinogen 0.0

## 2024-07-26 NOTE — FLOWSHEET NOTE
Pt  38/2 wks arrives with c/o nausea and cramping, pt denies bleeding or leaking of fluid, states feeling fetal movement. Pt is scheduled for repeat C/Section for this coming Wed.

## 2024-07-26 NOTE — FLOWSHEET NOTE
Discharge instructions given and reviewed with patient.  Informed patient to notify physican or come back to L & D if leaking fluid from vagina with or without contractions.  Bright red vaginal bleeding occurs that is as heavy as or heavier than a period.  Regular contractions that are 2-5 minutes apart that are longer, stronger, and closer together.  Decreased fetal movement.  Elevated temperature >100.5°F and chills.  Blurred vision, spots before eyes, unrelievable headache, severe facial swelling, or upper abdominal pain.  Questions and concerns denied by pt. Advised pt to keep next scheduled appt with OBGYN office unless needs seen her prior, she is agreeable.

## 2024-07-26 NOTE — FLOWSHEET NOTE
Dr Miller at the desk updated on admission note, vag exam cervix closed/thick, fhts reactive, no ctx on monitor, pt denies feeling them once she got here, pt rates pain 1/10, denies need for pain medication.

## 2024-07-28 ENCOUNTER — APPOINTMENT (OUTPATIENT)
Dept: GENERAL RADIOLOGY | Age: 34
End: 2024-07-28
Payer: MEDICAID

## 2024-07-28 ENCOUNTER — HOSPITAL ENCOUNTER (EMERGENCY)
Age: 34
Discharge: HOME | End: 2024-07-28
Attending: STUDENT IN AN ORGANIZED HEALTH CARE EDUCATION/TRAINING PROGRAM
Payer: MEDICAID

## 2024-07-28 VITALS
BODY MASS INDEX: 41.47 KG/M2 | HEART RATE: 77 BPM | RESPIRATION RATE: 17 BRPM | WEIGHT: 280 LBS | TEMPERATURE: 98.1 F | OXYGEN SATURATION: 98 % | HEIGHT: 69 IN | SYSTOLIC BLOOD PRESSURE: 140 MMHG | DIASTOLIC BLOOD PRESSURE: 70 MMHG

## 2024-07-28 DIAGNOSIS — J45.20 MILD INTERMITTENT ASTHMA WITHOUT COMPLICATION: ICD-10-CM

## 2024-07-28 DIAGNOSIS — J45.901 EXACERBATION OF ASTHMA, UNSPECIFIED ASTHMA SEVERITY, UNSPECIFIED WHETHER PERSISTENT: Primary | ICD-10-CM

## 2024-07-28 LAB
ALBUMIN SERPL BCG-MCNC: 3.4 G/DL (ref 3.5–5.1)
ALP SERPL-CCNC: 138 U/L (ref 38–126)
ALT SERPL W/O P-5'-P-CCNC: 6 U/L (ref 11–66)
ANION GAP SERPL CALC-SCNC: 14 MEQ/L (ref 8–16)
AST SERPL-CCNC: 12 U/L (ref 5–40)
BASOPHILS ABSOLUTE: 0 THOU/MM3 (ref 0–0.1)
BASOPHILS NFR BLD AUTO: 0.2 %
BILIRUB CONJ SERPL-MCNC: < 0.1 MG/DL (ref 0.1–13.8)
BILIRUB SERPL-MCNC: 0.2 MG/DL (ref 0.3–1.2)
BUN SERPL-MCNC: 4 MG/DL (ref 7–22)
CALCIUM SERPL-MCNC: 8.5 MG/DL (ref 8.5–10.5)
CHLORIDE SERPL-SCNC: 106 MEQ/L (ref 98–111)
CO2 SERPL-SCNC: 20 MEQ/L (ref 23–33)
CREAT SERPL-MCNC: 0.6 MG/DL (ref 0.4–1.2)
DEPRECATED RDW RBC AUTO: 41.2 FL (ref 35–45)
EOSINOPHIL NFR BLD AUTO: 1.6 %
EOSINOPHILS ABSOLUTE: 0.2 THOU/MM3 (ref 0–0.4)
ERYTHROCYTE [DISTWIDTH] IN BLOOD BY AUTOMATED COUNT: 14.5 % (ref 11.5–14.5)
FLUAV RNA RESP QL NAA+PROBE: NOT DETECTED
FLUBV RNA RESP QL NAA+PROBE: NOT DETECTED
GFR SERPL CREATININE-BSD FRML MDRD: > 90 ML/MIN/1.73M2
GLUCOSE SERPL-MCNC: 116 MG/DL (ref 70–108)
HCG INTACT+B SERPL-ACNC: 3472 MIU/ML (ref 0–5)
HCT VFR BLD AUTO: 33.1 % (ref 37–47)
HGB BLD-MCNC: 10.9 GM/DL (ref 12–16)
IMM GRANULOCYTES # BLD AUTO: 0.05 THOU/MM3 (ref 0–0.07)
IMM GRANULOCYTES NFR BLD AUTO: 0.5 %
LIPASE SERPL-CCNC: 14.9 U/L (ref 5.6–51.3)
LYMPHOCYTES ABSOLUTE: 1 THOU/MM3 (ref 1–4.8)
LYMPHOCYTES NFR BLD AUTO: 10.7 %
MAGNESIUM SERPL-MCNC: 1.6 MG/DL (ref 1.6–2.4)
MCH RBC QN AUTO: 26 PG (ref 26–33)
MCHC RBC AUTO-ENTMCNC: 32.9 GM/DL (ref 32.2–35.5)
MCV RBC AUTO: 79 FL (ref 81–99)
MONOCYTES ABSOLUTE: 0.5 THOU/MM3 (ref 0.4–1.3)
MONOCYTES NFR BLD AUTO: 4.9 %
NEUTROPHILS ABSOLUTE: 8 THOU/MM3 (ref 1.8–7.7)
NEUTROPHILS NFR BLD AUTO: 82.1 %
NRBC BLD AUTO-RTO: 0 /100 WBC
NT-PROBNP SERPL IA-MCNC: < 36 PG/ML (ref 0–124)
OSMOLALITY SERPL CALC.SUM OF ELEC: 277.3 MOSMOL/KG (ref 275–300)
PLATELET # BLD AUTO: 209 THOU/MM3 (ref 130–400)
PMV BLD AUTO: 12.6 FL (ref 9.4–12.4)
POTASSIUM SERPL-SCNC: 3.5 MEQ/L (ref 3.5–5.2)
PROT SERPL-MCNC: 6.5 G/DL (ref 6.1–8)
RBC # BLD AUTO: 4.19 MILL/MM3 (ref 4.2–5.4)
S PYO AG THROAT QL: NEGATIVE
S PYO THROAT QL CULT: NORMAL
SARS-COV-2 RNA RESP QL NAA+PROBE: NOT DETECTED
SODIUM SERPL-SCNC: 140 MEQ/L (ref 135–145)
TROPONIN, HIGH SENSITIVITY: < 6 NG/L (ref 0–12)
WBC # BLD AUTO: 9.8 THOU/MM3 (ref 4.8–10.8)

## 2024-07-28 PROCEDURE — 83735 ASSAY OF MAGNESIUM: CPT

## 2024-07-28 PROCEDURE — 87880 STREP A ASSAY W/OPTIC: CPT

## 2024-07-28 PROCEDURE — 99284 EMERGENCY DEPT VISIT MOD MDM: CPT

## 2024-07-28 PROCEDURE — 87070 CULTURE OTHR SPECIMN AEROBIC: CPT

## 2024-07-28 PROCEDURE — 84702 CHORIONIC GONADOTROPIN TEST: CPT

## 2024-07-28 PROCEDURE — 6370000000 HC RX 637 (ALT 250 FOR IP): Performed by: EMERGENCY MEDICINE

## 2024-07-28 PROCEDURE — 82248 BILIRUBIN DIRECT: CPT

## 2024-07-28 PROCEDURE — 87636 SARSCOV2 & INF A&B AMP PRB: CPT

## 2024-07-28 PROCEDURE — 83690 ASSAY OF LIPASE: CPT

## 2024-07-28 PROCEDURE — 96374 THER/PROPH/DIAG INJ IV PUSH: CPT

## 2024-07-28 PROCEDURE — 85025 COMPLETE CBC W/AUTO DIFF WBC: CPT

## 2024-07-28 PROCEDURE — 93005 ELECTROCARDIOGRAM TRACING: CPT | Performed by: EMERGENCY MEDICINE

## 2024-07-28 PROCEDURE — 6360000002 HC RX W HCPCS: Performed by: STUDENT IN AN ORGANIZED HEALTH CARE EDUCATION/TRAINING PROGRAM

## 2024-07-28 PROCEDURE — 2580000003 HC RX 258: Performed by: STUDENT IN AN ORGANIZED HEALTH CARE EDUCATION/TRAINING PROGRAM

## 2024-07-28 PROCEDURE — 80053 COMPREHEN METABOLIC PANEL: CPT

## 2024-07-28 PROCEDURE — 83880 ASSAY OF NATRIURETIC PEPTIDE: CPT

## 2024-07-28 PROCEDURE — 36415 COLL VENOUS BLD VENIPUNCTURE: CPT

## 2024-07-28 PROCEDURE — 59025 FETAL NON-STRESS TEST: CPT

## 2024-07-28 PROCEDURE — 94640 AIRWAY INHALATION TREATMENT: CPT

## 2024-07-28 PROCEDURE — 84484 ASSAY OF TROPONIN QUANT: CPT

## 2024-07-28 PROCEDURE — 6370000000 HC RX 637 (ALT 250 FOR IP): Performed by: STUDENT IN AN ORGANIZED HEALTH CARE EDUCATION/TRAINING PROGRAM

## 2024-07-28 RX ORDER — IPRATROPIUM BROMIDE AND ALBUTEROL SULFATE 2.5; .5 MG/3ML; MG/3ML
2 SOLUTION RESPIRATORY (INHALATION) ONCE
Status: COMPLETED | OUTPATIENT
Start: 2024-07-28 | End: 2024-07-28

## 2024-07-28 RX ORDER — ALBUTEROL SULFATE 90 UG/1
2 AEROSOL, METERED RESPIRATORY (INHALATION) EVERY 6 HOURS PRN
Status: DISCONTINUED | OUTPATIENT
Start: 2024-07-28 | End: 2024-07-28 | Stop reason: HOSPADM

## 2024-07-28 RX ORDER — PREDNISONE 20 MG/1
20 TABLET ORAL 2 TIMES DAILY
Qty: 8 TABLET | Refills: 0 | Status: ON HOLD | OUTPATIENT
Start: 2024-07-28 | End: 2024-08-02 | Stop reason: HOSPADM

## 2024-07-28 RX ADMIN — IPRATROPIUM BROMIDE AND ALBUTEROL SULFATE 2 DOSE: .5; 3 SOLUTION RESPIRATORY (INHALATION) at 17:40

## 2024-07-28 RX ADMIN — WATER 80 MG: 1 INJECTION INTRAMUSCULAR; INTRAVENOUS; SUBCUTANEOUS at 18:56

## 2024-07-28 RX ADMIN — ALBUTEROL SULFATE 2 PUFF: 90 AEROSOL, METERED RESPIRATORY (INHALATION) at 21:40

## 2024-07-28 RX ADMIN — ALBUTEROL SULFATE 2.5 MG: 2.5 SOLUTION RESPIRATORY (INHALATION) at 18:50

## 2024-07-28 ASSESSMENT — PAIN - FUNCTIONAL ASSESSMENT
PAIN_FUNCTIONAL_ASSESSMENT: NONE - DENIES PAIN
PAIN_FUNCTIONAL_ASSESSMENT: 0-10

## 2024-07-28 ASSESSMENT — PAIN SCALES - GENERAL: PAINLEVEL_OUTOF10: 4

## 2024-07-28 NOTE — ED NOTES
Pt medicated per MAR. Vitals reevaluated. Respirations even and unlabored. Respiratory at bedside.

## 2024-07-28 NOTE — ED TRIAGE NOTES
Pt reports to the ED with shortness of breath due to her asthma. Pt states her chest is tight.. Pt states she normally uses her nebulizer but it is broken. Pt asking for a breathing treatment upon arrival. Pt is 38 weeks pregnant. Pt OB is Dr. Saldaña. EKG complete.

## 2024-07-28 NOTE — ED PROVIDER NOTES
(PROVENTIL;VENTOLIN;PROAIR) 108 (90 Base) MCG/ACT inhaler 2 puff (2 puffs Inhalation Given 7/28/24 2140)   ipratropium 0.5 mg-albuterol 2.5 mg (DUONEB) nebulizer solution 2 Dose (2 Doses Inhalation Given 7/28/24 1740)   albuterol (PROVENTIL) nebulizer solution 2.5 mg (2.5 mg Nebulization Given 7/28/24 1850)   methylPREDNISolone sodium succ (SOLU-MEDROL) 80 mg in sterile water 1.28 mL injection (80 mg IntraVENous Given 7/28/24 1856)       ED Course as of 07/28/24 2144   Sun Jul 28, 2024 1853 Patient informed she is receiving steroids and agreed to the plan. [CA]   1854 hCG,Beta Subunit,Qual,Serum(!): 3472.0  Empiric labs ordered by other provider.  Expected decrease in hCG late in pregnancy. [EM]   1927 Group A Strep, Reflex:    Rapid Strep A Screen Negative [CA]   1941 Dr. Sanchez contacted, requested a monitoring strip for fetus.  [CA]   1944 Patient seen and informed of plan.  [CA]   1944 Patient reported chest tightness has decreased significantly and states she's feeling better. [CA]   2101 Significant improvement in patient's wheezes.  Planning discharge after her tocodynamometry is finished [EM]   2135 DME order for nebulizer as outpatient performed.  Patient requires new nebulizer prescription as her machine was broken after somebody stepped on it. [EM]      ED Course User Index  [CA] Son Gonzalez MD  [EM] Syed Vasquez, DO       PROCEDURES: (None if blank)  Procedures:     CRITICAL CARE:  None    MEDICATION CHANGES     New Prescriptions    PREDNISONE (DELTASONE) 20 MG TABLET    Take 1 tablet by mouth 2 times daily for 4 days       FINAL DISPOSITION   Shared Decision-Making was performed, disposition discussed with the patient/family and questions answered.    Outpatient follow up (If applicable):  Lizz Mejia, ABHAY - CNP  [address] [address]  [phone]    Schedule an appointment as soon as possible for a visit       Angeles Sanchez MD  [address] [facility] [place]

## 2024-07-29 LAB
EKG ATRIAL RATE: 78 BPM
EKG P AXIS: 58 DEGREES
EKG P-R INTERVAL: 122 MS
EKG Q-T INTERVAL: 368 MS
EKG QRS DURATION: 70 MS
EKG QTC CALCULATION (BAZETT): 419 MS
EKG R AXIS: 24 DEGREES
EKG T AXIS: 41 DEGREES
EKG VENTRICULAR RATE: 78 BPM

## 2024-07-29 PROCEDURE — 93010 ELECTROCARDIOGRAM REPORT: CPT | Performed by: INTERNAL MEDICINE

## 2024-07-29 NOTE — DISCHARGE INSTRUCTIONS
Take your prednisone as prescribed.  Take Pepcid (famotidine - over the counter) every day while you are taking the steroids.  If you are a diabetic, you should check your blood sugar more frequently while taking prednisone.  Use your inhaler or nebulizer as prescribed, or at minimum every 4 hours while you are having an asthma attack.    Being around people that smoke, vladimir houses, weather change can cause an asthma exacerbation.    PLEASE RETURN TO THE EMERGENCY DEPARTMENT IMMEDIATELY for worsening symptoms of shortness of breath, wheezing, change in the amount of sputum that you cough up or a change in the color of your sputum, using your inhaler more frequently or if your inhaler only lasts up to 2 hours, or if you develop any concerning symptoms such as: high fever not relieved by acetaminophen (Tylenol) and/or ibuprofen (Motrin / Advil), chills, shortness of breath, chest pain, feeling of your heart fluttering or racing, persistent nausea and/or vomiting, numbness, weakness or tingling in the arms or legs or change in color of the extremities, changes in mental status, persistent headache, blurry vision, unable to follow up with your physician, or other any other care or concern.

## 2024-07-29 NOTE — FLOWSHEET NOTE
RN at bedside removing EFM/[employer] from soft non-tender abdomen. NST reactive, okay to remove per Dr. RODNEY Sanchez. Primary ER RN notified.

## 2024-07-29 NOTE — ED NOTES
Pt vitals reevaluated at this time. Respirations even and unlabored. OB monitoring occurring at this time. Denies further needs. Call light within reach.

## 2024-07-30 LAB — BACTERIA THROAT AEROBE CULT: NORMAL

## 2024-07-31 ENCOUNTER — ANESTHESIA EVENT (OUTPATIENT)
Dept: LABOR AND DELIVERY | Age: 34
End: 2024-07-31
Payer: MEDICAID

## 2024-07-31 ENCOUNTER — HOSPITAL ENCOUNTER (INPATIENT)
Age: 34
LOS: 2 days | Discharge: HOME | End: 2024-08-02
Attending: OBSTETRICS & GYNECOLOGY | Admitting: OBSTETRICS & GYNECOLOGY
Payer: MEDICAID

## 2024-07-31 ENCOUNTER — ANESTHESIA (OUTPATIENT)
Dept: LABOR AND DELIVERY | Age: 34
End: 2024-07-31
Payer: MEDICAID

## 2024-07-31 DIAGNOSIS — R10.9 ABDOMINAL CRAMPING: ICD-10-CM

## 2024-07-31 LAB
ABO: NORMAL
AMPHETAMINES UR QL SCN: NEGATIVE
ANTIBODY SCREEN: NORMAL
BARBITURATES UR QL SCN: NEGATIVE
BASOPHILS ABSOLUTE: 0 THOU/MM3 (ref 0–0.1)
BASOPHILS NFR BLD AUTO: 0.2 %
BENZODIAZ UR QL SCN: NEGATIVE
BZE UR QL SCN: NEGATIVE
CANNABINOIDS UR QL SCN: NEGATIVE
DEPRECATED RDW RBC AUTO: 41.8 FL (ref 35–45)
EOSINOPHIL NFR BLD AUTO: 0.2 %
EOSINOPHILS ABSOLUTE: 0 THOU/MM3 (ref 0–0.4)
ERYTHROCYTE [DISTWIDTH] IN BLOOD BY AUTOMATED COUNT: 14.6 % (ref 11.5–14.5)
FENTANYL: NEGATIVE
HCT VFR BLD AUTO: 33 % (ref 37–47)
HGB BLD-MCNC: 10.7 GM/DL (ref 12–16)
IMM GRANULOCYTES # BLD AUTO: 0.05 THOU/MM3 (ref 0–0.07)
IMM GRANULOCYTES NFR BLD AUTO: 0.5 %
LYMPHOCYTES ABSOLUTE: 1.7 THOU/MM3 (ref 1–4.8)
LYMPHOCYTES NFR BLD AUTO: 15.5 %
MCH RBC QN AUTO: 25.7 PG (ref 26–33)
MCHC RBC AUTO-ENTMCNC: 32.4 GM/DL (ref 32.2–35.5)
MCV RBC AUTO: 79.3 FL (ref 81–99)
MONOCYTES ABSOLUTE: 0.6 THOU/MM3 (ref 0.4–1.3)
MONOCYTES NFR BLD AUTO: 5.4 %
NEUTROPHILS ABSOLUTE: 8.4 THOU/MM3 (ref 1.8–7.7)
NEUTROPHILS NFR BLD AUTO: 78.2 %
NRBC BLD AUTO-RTO: 0 /100 WBC
OPIATES UR QL SCN: NEGATIVE
OXYCODONE: NEGATIVE
PHENCYCLIDINE QUANTITATIVE URINE: NEGATIVE
PLATELET # BLD AUTO: 218 THOU/MM3 (ref 130–400)
PMV BLD AUTO: 13 FL (ref 9.4–12.4)
RBC # BLD AUTO: 4.16 MILL/MM3 (ref 4.2–5.4)
RH FACTOR: NORMAL
RPR SER QL: NONREACTIVE
WBC # BLD AUTO: 10.7 THOU/MM3 (ref 4.8–10.8)

## 2024-07-31 PROCEDURE — 6370000000 HC RX 637 (ALT 250 FOR IP): Performed by: OBSTETRICS & GYNECOLOGY

## 2024-07-31 PROCEDURE — 2709999900 HC NON-CHARGEABLE SUPPLY: Performed by: OBSTETRICS & GYNECOLOGY

## 2024-07-31 PROCEDURE — 3700000001 HC ADD 15 MINUTES (ANESTHESIA): Performed by: OBSTETRICS & GYNECOLOGY

## 2024-07-31 PROCEDURE — 94640 AIRWAY INHALATION TREATMENT: CPT

## 2024-07-31 PROCEDURE — 6360000002 HC RX W HCPCS: Performed by: OBSTETRICS & GYNECOLOGY

## 2024-07-31 PROCEDURE — 6360000002 HC RX W HCPCS: Performed by: STUDENT IN AN ORGANIZED HEALTH CARE EDUCATION/TRAINING PROGRAM

## 2024-07-31 PROCEDURE — 85025 COMPLETE CBC W/AUTO DIFF WBC: CPT

## 2024-07-31 PROCEDURE — 6360000002 HC RX W HCPCS

## 2024-07-31 PROCEDURE — 2500000003 HC RX 250 WO HCPCS: Performed by: OBSTETRICS & GYNECOLOGY

## 2024-07-31 PROCEDURE — 6370000000 HC RX 637 (ALT 250 FOR IP)

## 2024-07-31 PROCEDURE — 7100000000 HC PACU RECOVERY - FIRST 15 MIN: Performed by: OBSTETRICS & GYNECOLOGY

## 2024-07-31 PROCEDURE — 86923 COMPATIBILITY TEST ELECTRIC: CPT

## 2024-07-31 PROCEDURE — 6360000002 HC RX W HCPCS: Performed by: ANESTHESIOLOGY

## 2024-07-31 PROCEDURE — 3609079900 HC CESAREAN SECTION: Performed by: OBSTETRICS & GYNECOLOGY

## 2024-07-31 PROCEDURE — 86850 RBC ANTIBODY SCREEN: CPT

## 2024-07-31 PROCEDURE — 6360000002 HC RX W HCPCS: Performed by: NURSE ANESTHETIST, CERTIFIED REGISTERED

## 2024-07-31 PROCEDURE — 86592 SYPHILIS TEST NON-TREP QUAL: CPT

## 2024-07-31 PROCEDURE — 7100000001 HC PACU RECOVERY - ADDTL 15 MIN: Performed by: OBSTETRICS & GYNECOLOGY

## 2024-07-31 PROCEDURE — 6370000000 HC RX 637 (ALT 250 FOR IP): Performed by: NURSE ANESTHETIST, CERTIFIED REGISTERED

## 2024-07-31 PROCEDURE — 3700000000 HC ANESTHESIA ATTENDED CARE: Performed by: OBSTETRICS & GYNECOLOGY

## 2024-07-31 PROCEDURE — 2580000003 HC RX 258: Performed by: OBSTETRICS & GYNECOLOGY

## 2024-07-31 PROCEDURE — 2580000003 HC RX 258: Performed by: NURSE ANESTHETIST, CERTIFIED REGISTERED

## 2024-07-31 PROCEDURE — 86900 BLOOD TYPING SEROLOGIC ABO: CPT

## 2024-07-31 PROCEDURE — 1220000000 HC SEMI PRIVATE OB R&B

## 2024-07-31 PROCEDURE — 86901 BLOOD TYPING SEROLOGIC RH(D): CPT

## 2024-07-31 PROCEDURE — 80305 DRUG TEST PRSMV DIR OPT OBS: CPT

## 2024-07-31 PROCEDURE — 94761 N-INVAS EAR/PLS OXIMETRY MLT: CPT

## 2024-07-31 RX ORDER — METRONIDAZOLE 500 MG/100ML
500 INJECTION, SOLUTION INTRAVENOUS ONCE
Status: COMPLETED | OUTPATIENT
Start: 2024-07-31 | End: 2024-07-31

## 2024-07-31 RX ORDER — BUPIVACAINE HYDROCHLORIDE 7.5 MG/ML
INJECTION, SOLUTION INTRASPINAL
Status: COMPLETED | OUTPATIENT
Start: 2024-07-31 | End: 2024-07-31

## 2024-07-31 RX ORDER — OXYCODONE HYDROCHLORIDE 5 MG/1
10 TABLET ORAL EVERY 4 HOURS PRN
Status: DISCONTINUED | OUTPATIENT
Start: 2024-08-01 | End: 2024-08-02 | Stop reason: HOSPADM

## 2024-07-31 RX ORDER — MISOPROSTOL 200 UG/1
800 TABLET ORAL PRN
Status: DISCONTINUED | OUTPATIENT
Start: 2024-07-31 | End: 2024-08-02 | Stop reason: HOSPADM

## 2024-07-31 RX ORDER — SODIUM CHLORIDE, SODIUM LACTATE, POTASSIUM CHLORIDE, CALCIUM CHLORIDE 600; 310; 30; 20 MG/100ML; MG/100ML; MG/100ML; MG/100ML
INJECTION, SOLUTION INTRAVENOUS CONTINUOUS
Status: DISCONTINUED | OUTPATIENT
Start: 2024-07-31 | End: 2024-07-31

## 2024-07-31 RX ORDER — ONDANSETRON 2 MG/ML
INJECTION INTRAMUSCULAR; INTRAVENOUS PRN
Status: DISCONTINUED | OUTPATIENT
Start: 2024-07-31 | End: 2024-07-31 | Stop reason: SDUPTHER

## 2024-07-31 RX ORDER — ALBUTEROL SULFATE 2.5 MG/3ML
2.5 SOLUTION RESPIRATORY (INHALATION) EVERY 6 HOURS PRN
Status: DISCONTINUED | OUTPATIENT
Start: 2024-07-31 | End: 2024-07-31

## 2024-07-31 RX ORDER — ALBUTEROL SULFATE 2.5 MG/3ML
2.5 SOLUTION RESPIRATORY (INHALATION) EVERY 4 HOURS PRN
Status: DISCONTINUED | OUTPATIENT
Start: 2024-07-31 | End: 2024-08-02 | Stop reason: HOSPADM

## 2024-07-31 RX ORDER — OXYCODONE HYDROCHLORIDE 5 MG/1
5 TABLET ORAL EVERY 4 HOURS PRN
Status: DISPENSED | OUTPATIENT
Start: 2024-07-31 | End: 2024-08-01

## 2024-07-31 RX ORDER — MISOPROSTOL 100 UG/1
TABLET ORAL PRN
Status: DISCONTINUED | OUTPATIENT
Start: 2024-07-31 | End: 2024-07-31 | Stop reason: SDUPTHER

## 2024-07-31 RX ORDER — ONDANSETRON 2 MG/ML
4 INJECTION INTRAMUSCULAR; INTRAVENOUS EVERY 6 HOURS PRN
Status: DISCONTINUED | OUTPATIENT
Start: 2024-07-31 | End: 2024-07-31

## 2024-07-31 RX ORDER — MORPHINE SULFATE 4 MG/ML
4 INJECTION, SOLUTION INTRAMUSCULAR; INTRAVENOUS
Status: DISCONTINUED | OUTPATIENT
Start: 2024-08-01 | End: 2024-08-02 | Stop reason: HOSPADM

## 2024-07-31 RX ORDER — PRENATAL WITH FERROUS FUM AND FOLIC ACID 3080; 920; 120; 400; 22; 1.84; 3; 20; 10; 1; 12; 200; 27; 25; 2 [IU]/1; [IU]/1; MG/1; [IU]/1; MG/1; MG/1; MG/1; MG/1; MG/1; MG/1; UG/1; MG/1; MG/1; MG/1; MG/1
1 TABLET ORAL DAILY
Status: DISCONTINUED | OUTPATIENT
Start: 2024-07-31 | End: 2024-08-02 | Stop reason: HOSPADM

## 2024-07-31 RX ORDER — METRONIDAZOLE 500 MG/1
500 TABLET ORAL EVERY 8 HOURS SCHEDULED
Status: DISPENSED | OUTPATIENT
Start: 2024-07-31 | End: 2024-08-02

## 2024-07-31 RX ORDER — ALBUTEROL SULFATE 2.5 MG/3ML
2.5 SOLUTION RESPIRATORY (INHALATION)
Status: DISCONTINUED | OUTPATIENT
Start: 2024-07-31 | End: 2024-08-01

## 2024-07-31 RX ORDER — ACETAMINOPHEN 500 MG
1000 TABLET ORAL EVERY 8 HOURS SCHEDULED
Status: DISCONTINUED | OUTPATIENT
Start: 2024-08-01 | End: 2024-08-02 | Stop reason: HOSPADM

## 2024-07-31 RX ORDER — SODIUM CHLORIDE 0.9 % (FLUSH) 0.9 %
5-40 SYRINGE (ML) INJECTION EVERY 12 HOURS SCHEDULED
Status: DISCONTINUED | OUTPATIENT
Start: 2024-07-31 | End: 2024-08-02 | Stop reason: HOSPADM

## 2024-07-31 RX ORDER — FERROUS SULFATE 325(65) MG
325 TABLET ORAL
Status: DISCONTINUED | OUTPATIENT
Start: 2024-08-01 | End: 2024-08-02 | Stop reason: HOSPADM

## 2024-07-31 RX ORDER — DIPHENHYDRAMINE HCL 25 MG
25 TABLET ORAL EVERY 6 HOURS PRN
Status: DISCONTINUED | OUTPATIENT
Start: 2024-08-01 | End: 2024-08-02 | Stop reason: HOSPADM

## 2024-07-31 RX ORDER — MORPHINE SULFATE 2 MG/ML
2 INJECTION, SOLUTION INTRAMUSCULAR; INTRAVENOUS
Status: DISCONTINUED | OUTPATIENT
Start: 2024-08-01 | End: 2024-08-02 | Stop reason: HOSPADM

## 2024-07-31 RX ORDER — DIPHENHYDRAMINE HYDROCHLORIDE 50 MG/ML
25 INJECTION INTRAMUSCULAR; INTRAVENOUS EVERY 6 HOURS PRN
Status: ACTIVE | OUTPATIENT
Start: 2024-07-31 | End: 2024-08-01

## 2024-07-31 RX ORDER — NALOXONE HYDROCHLORIDE 0.4 MG/ML
INJECTION, SOLUTION INTRAMUSCULAR; INTRAVENOUS; SUBCUTANEOUS PRN
Status: ACTIVE | OUTPATIENT
Start: 2024-07-31 | End: 2024-08-01

## 2024-07-31 RX ORDER — SODIUM CHLORIDE 9 MG/ML
INJECTION, SOLUTION INTRAVENOUS PRN
Status: DISCONTINUED | OUTPATIENT
Start: 2024-07-31 | End: 2024-08-02 | Stop reason: HOSPADM

## 2024-07-31 RX ORDER — ACETAMINOPHEN 325 MG/1
650 TABLET ORAL EVERY 6 HOURS
Status: DISPENSED | OUTPATIENT
Start: 2024-07-31 | End: 2024-08-01

## 2024-07-31 RX ORDER — METOCLOPRAMIDE HYDROCHLORIDE 5 MG/ML
10 INJECTION INTRAMUSCULAR; INTRAVENOUS ONCE
Status: COMPLETED | OUTPATIENT
Start: 2024-07-31 | End: 2024-07-31

## 2024-07-31 RX ORDER — SODIUM CHLORIDE 0.9 % (FLUSH) 0.9 %
5-40 SYRINGE (ML) INJECTION PRN
Status: DISCONTINUED | OUTPATIENT
Start: 2024-07-31 | End: 2024-08-02 | Stop reason: HOSPADM

## 2024-07-31 RX ORDER — SODIUM CHLORIDE, SODIUM LACTATE, POTASSIUM CHLORIDE, AND CALCIUM CHLORIDE .6; .31; .03; .02 G/100ML; G/100ML; G/100ML; G/100ML
1000 INJECTION, SOLUTION INTRAVENOUS ONCE
Status: COMPLETED | OUTPATIENT
Start: 2024-07-31 | End: 2024-07-31

## 2024-07-31 RX ORDER — MODIFIED LANOLIN
OINTMENT (GRAM) TOPICAL
Status: DISCONTINUED | OUTPATIENT
Start: 2024-07-31 | End: 2024-08-02 | Stop reason: HOSPADM

## 2024-07-31 RX ORDER — DOCUSATE SODIUM 100 MG/1
100 CAPSULE, LIQUID FILLED ORAL 2 TIMES DAILY
Status: DISCONTINUED | OUTPATIENT
Start: 2024-07-31 | End: 2024-08-02 | Stop reason: HOSPADM

## 2024-07-31 RX ORDER — SODIUM CHLORIDE, SODIUM LACTATE, POTASSIUM CHLORIDE, CALCIUM CHLORIDE 600; 310; 30; 20 MG/100ML; MG/100ML; MG/100ML; MG/100ML
INJECTION, SOLUTION INTRAVENOUS CONTINUOUS
Status: DISCONTINUED | OUTPATIENT
Start: 2024-07-31 | End: 2024-08-02 | Stop reason: HOSPADM

## 2024-07-31 RX ORDER — METHYLERGONOVINE MALEATE 0.2 MG/ML
200 INJECTION INTRAVENOUS PRN
Status: DISCONTINUED | OUTPATIENT
Start: 2024-07-31 | End: 2024-08-02 | Stop reason: HOSPADM

## 2024-07-31 RX ORDER — ONDANSETRON 4 MG/1
4 TABLET, ORALLY DISINTEGRATING ORAL EVERY 8 HOURS PRN
Status: DISCONTINUED | OUTPATIENT
Start: 2024-08-01 | End: 2024-08-02 | Stop reason: HOSPADM

## 2024-07-31 RX ORDER — OXYCODONE HYDROCHLORIDE 5 MG/1
5 TABLET ORAL EVERY 4 HOURS PRN
Status: DISCONTINUED | OUTPATIENT
Start: 2024-08-01 | End: 2024-08-02 | Stop reason: HOSPADM

## 2024-07-31 RX ORDER — CARBOPROST TROMETHAMINE 250 UG/ML
250 INJECTION, SOLUTION INTRAMUSCULAR PRN
Status: DISCONTINUED | OUTPATIENT
Start: 2024-07-31 | End: 2024-08-02 | Stop reason: HOSPADM

## 2024-07-31 RX ORDER — MORPHINE SULFATE 0.5 MG/ML
INJECTION, SOLUTION EPIDURAL; INTRATHECAL; INTRAVENOUS
Status: COMPLETED | OUTPATIENT
Start: 2024-07-31 | End: 2024-07-31

## 2024-07-31 RX ORDER — ONDANSETRON 2 MG/ML
4 INJECTION INTRAMUSCULAR; INTRAVENOUS EVERY 6 HOURS PRN
Status: ACTIVE | OUTPATIENT
Start: 2024-07-31 | End: 2024-08-01

## 2024-07-31 RX ORDER — OXYCODONE HYDROCHLORIDE 5 MG/1
10 TABLET ORAL EVERY 4 HOURS PRN
Status: ACTIVE | OUTPATIENT
Start: 2024-07-31 | End: 2024-08-01

## 2024-07-31 RX ORDER — FAMOTIDINE 20 MG/1
20 TABLET, FILM COATED ORAL 2 TIMES DAILY
Status: DISCONTINUED | OUTPATIENT
Start: 2024-07-31 | End: 2024-08-02 | Stop reason: HOSPADM

## 2024-07-31 RX ORDER — BISACODYL 10 MG
10 SUPPOSITORY, RECTAL RECTAL DAILY PRN
Status: DISCONTINUED | OUTPATIENT
Start: 2024-07-31 | End: 2024-08-02 | Stop reason: HOSPADM

## 2024-07-31 RX ORDER — ACETAMINOPHEN 325 MG/1
975 TABLET ORAL ONCE
Status: COMPLETED | OUTPATIENT
Start: 2024-07-31 | End: 2024-07-31

## 2024-07-31 RX ORDER — OXYTOCIN 10 [USP'U]/ML
INJECTION, SOLUTION INTRAMUSCULAR; INTRAVENOUS PRN
Status: DISCONTINUED | OUTPATIENT
Start: 2024-07-31 | End: 2024-07-31 | Stop reason: SDUPTHER

## 2024-07-31 RX ORDER — SODIUM CHLORIDE, SODIUM LACTATE, POTASSIUM CHLORIDE, CALCIUM CHLORIDE 600; 310; 30; 20 MG/100ML; MG/100ML; MG/100ML; MG/100ML
INJECTION, SOLUTION INTRAVENOUS CONTINUOUS PRN
Status: DISCONTINUED | OUTPATIENT
Start: 2024-07-31 | End: 2024-07-31 | Stop reason: SDUPTHER

## 2024-07-31 RX ORDER — ALBUTEROL SULFATE 2.5 MG/3ML
2.5 SOLUTION RESPIRATORY (INHALATION) 4 TIMES DAILY PRN
Status: DISCONTINUED | OUTPATIENT
Start: 2024-07-31 | End: 2024-07-31

## 2024-07-31 RX ORDER — DIPHENHYDRAMINE HCL 25 MG
25 TABLET ORAL EVERY 6 HOURS PRN
Status: ACTIVE | OUTPATIENT
Start: 2024-07-31 | End: 2024-08-01

## 2024-07-31 RX ORDER — ONDANSETRON 2 MG/ML
4 INJECTION INTRAMUSCULAR; INTRAVENOUS EVERY 6 HOURS PRN
Status: DISCONTINUED | OUTPATIENT
Start: 2024-08-01 | End: 2024-08-02 | Stop reason: HOSPADM

## 2024-07-31 RX ORDER — FENTANYL CITRATE 50 UG/ML
INJECTION, SOLUTION INTRAMUSCULAR; INTRAVENOUS
Status: COMPLETED | OUTPATIENT
Start: 2024-07-31 | End: 2024-07-31

## 2024-07-31 RX ORDER — CITRIC ACID/SODIUM CITRATE 334-500MG
15 SOLUTION, ORAL ORAL ONCE
Status: DISCONTINUED | OUTPATIENT
Start: 2024-07-31 | End: 2024-07-31

## 2024-07-31 RX ORDER — ENOXAPARIN SODIUM 100 MG/ML
30 INJECTION SUBCUTANEOUS EVERY 12 HOURS
Status: DISCONTINUED | OUTPATIENT
Start: 2024-08-01 | End: 2024-08-02 | Stop reason: HOSPADM

## 2024-07-31 RX ORDER — OXYTOCIN/0.9 % SODIUM CHLORIDE 30/500 ML
87.3 PLASTIC BAG, INJECTION (ML) INTRAVENOUS CONTINUOUS PRN
Status: DISCONTINUED | OUTPATIENT
Start: 2024-07-31 | End: 2024-07-31

## 2024-07-31 RX ORDER — PROMETHAZINE HYDROCHLORIDE 25 MG/ML
12.5 INJECTION, SOLUTION INTRAMUSCULAR; INTRAVENOUS ONCE
Status: COMPLETED | OUTPATIENT
Start: 2024-07-31 | End: 2024-07-31

## 2024-07-31 RX ORDER — ALBUTEROL SULFATE 2.5 MG/3ML
2.5 SOLUTION RESPIRATORY (INHALATION) ONCE
Status: COMPLETED | OUTPATIENT
Start: 2024-07-31 | End: 2024-07-31

## 2024-07-31 RX ORDER — PREDNISONE 20 MG/1
20 TABLET ORAL 2 TIMES DAILY
Status: DISCONTINUED | OUTPATIENT
Start: 2024-07-31 | End: 2024-08-02 | Stop reason: HOSPADM

## 2024-07-31 RX ORDER — SIMETHICONE 80 MG
80 TABLET,CHEWABLE ORAL EVERY 6 HOURS PRN
Status: DISCONTINUED | OUTPATIENT
Start: 2024-07-31 | End: 2024-08-02 | Stop reason: HOSPADM

## 2024-07-31 RX ADMIN — PREDNISONE 20 MG: 20 TABLET ORAL at 20:48

## 2024-07-31 RX ADMIN — METRONIDAZOLE 500 MG: 500 INJECTION, SOLUTION INTRAVENOUS at 09:47

## 2024-07-31 RX ADMIN — OXYTOCIN 20 UNITS: 10 INJECTION, SOLUTION INTRAMUSCULAR; INTRAVENOUS at 10:46

## 2024-07-31 RX ADMIN — SODIUM CHLORIDE, POTASSIUM CHLORIDE, SODIUM LACTATE AND CALCIUM CHLORIDE: 600; 310; 30; 20 INJECTION, SOLUTION INTRAVENOUS at 09:22

## 2024-07-31 RX ADMIN — Medication 87.3 MILLI-UNITS/MIN: at 11:53

## 2024-07-31 RX ADMIN — METOCLOPRAMIDE 10 MG: 5 INJECTION, SOLUTION INTRAMUSCULAR; INTRAVENOUS at 08:11

## 2024-07-31 RX ADMIN — ALBUTEROL SULFATE 2.5 MG: 2.5 SOLUTION RESPIRATORY (INHALATION) at 07:23

## 2024-07-31 RX ADMIN — METRONIDAZOLE 500 MG: 500 TABLET ORAL at 22:31

## 2024-07-31 RX ADMIN — SODIUM CHLORIDE, POTASSIUM CHLORIDE, SODIUM LACTATE AND CALCIUM CHLORIDE 1000 ML: 600; 310; 30; 20 INJECTION, SOLUTION INTRAVENOUS at 06:54

## 2024-07-31 RX ADMIN — SODIUM CHLORIDE, POTASSIUM CHLORIDE, SODIUM LACTATE AND CALCIUM CHLORIDE: 600; 310; 30; 20 INJECTION, SOLUTION INTRAVENOUS at 10:45

## 2024-07-31 RX ADMIN — FENTANYL CITRATE 20 MCG: 50 INJECTION, SOLUTION INTRAMUSCULAR; INTRAVENOUS at 09:36

## 2024-07-31 RX ADMIN — PROMETHAZINE HYDROCHLORIDE 12.5 MG: 25 INJECTION INTRAMUSCULAR; INTRAVENOUS at 18:16

## 2024-07-31 RX ADMIN — ONDANSETRON 4 MG: 2 INJECTION INTRAMUSCULAR; INTRAVENOUS at 14:39

## 2024-07-31 RX ADMIN — FAMOTIDINE 20 MG: 10 INJECTION INTRAVENOUS at 08:11

## 2024-07-31 RX ADMIN — BUPIVACAINE HYDROCHLORIDE 12 MG: 7.5 INJECTION INTRAVENOUS at 09:36

## 2024-07-31 RX ADMIN — MORPHINE SULFATE 0.2 MG: 0.5 INJECTION, SOLUTION EPIDURAL; INTRATHECAL; INTRAVENOUS at 09:36

## 2024-07-31 RX ADMIN — OXYTOCIN 20 UNITS: 10 INJECTION, SOLUTION INTRAMUSCULAR; INTRAVENOUS at 10:14

## 2024-07-31 RX ADMIN — ACETAMINOPHEN 975 MG: 325 TABLET ORAL at 08:11

## 2024-07-31 RX ADMIN — SODIUM CHLORIDE, POTASSIUM CHLORIDE, SODIUM LACTATE AND CALCIUM CHLORIDE: 600; 310; 30; 20 INJECTION, SOLUTION INTRAVENOUS at 07:57

## 2024-07-31 RX ADMIN — GENTAMICIN SULFATE 452.4 MG: 40 INJECTION, SOLUTION INTRAMUSCULAR; INTRAVENOUS at 09:10

## 2024-07-31 RX ADMIN — ALBUTEROL SULFATE 2.5 MG: 2.5 SOLUTION RESPIRATORY (INHALATION) at 15:20

## 2024-07-31 RX ADMIN — SODIUM CHLORIDE, POTASSIUM CHLORIDE, SODIUM LACTATE AND CALCIUM CHLORIDE: 600; 310; 30; 20 INJECTION, SOLUTION INTRAVENOUS at 20:18

## 2024-07-31 RX ADMIN — MISOPROSTOL 400 MCG: 100 TABLET ORAL at 10:16

## 2024-07-31 RX ADMIN — SODIUM CHLORIDE, POTASSIUM CHLORIDE, SODIUM LACTATE AND CALCIUM CHLORIDE: 600; 310; 30; 20 INJECTION, SOLUTION INTRAVENOUS at 11:52

## 2024-07-31 RX ADMIN — ONDANSETRON 4 MG: 2 INJECTION INTRAMUSCULAR; INTRAVENOUS at 09:23

## 2024-07-31 ASSESSMENT — PAIN DESCRIPTION - PAIN TYPE: TYPE: ACUTE PAIN;SURGICAL PAIN

## 2024-07-31 ASSESSMENT — PAIN DESCRIPTION - ONSET: ONSET: ON-GOING

## 2024-07-31 ASSESSMENT — PAIN DESCRIPTION - ORIENTATION: ORIENTATION: LOWER

## 2024-07-31 ASSESSMENT — PAIN DESCRIPTION - FREQUENCY: FREQUENCY: CONTINUOUS

## 2024-07-31 ASSESSMENT — PAIN DESCRIPTION - DESCRIPTORS: DESCRIPTORS: SORE

## 2024-07-31 ASSESSMENT — PAIN - FUNCTIONAL ASSESSMENT: PAIN_FUNCTIONAL_ASSESSMENT: ACTIVITIES ARE NOT PREVENTED

## 2024-07-31 ASSESSMENT — PAIN DESCRIPTION - LOCATION: LOCATION: ABDOMEN;INCISION

## 2024-07-31 ASSESSMENT — PAIN SCALES - GENERAL
PAINLEVEL_OUTOF10: 3
PAINLEVEL_OUTOF10: 0

## 2024-07-31 NOTE — PLAN OF CARE
transportation as appropriate     Problem: Chronic Conditions and Co-morbidities  Goal: Patient's chronic conditions and co-morbidity symptoms are monitored and maintained or improved  Outcome: Progressing  Flowsheets (Taken 7/31/2024 8067)  Care Plan - Patient's Chronic Conditions and Co-Morbidity Symptoms are Monitored and Maintained or Improved: Monitor and assess patient's chronic conditions and comorbid symptoms for stability, deterioration, or improvement  Note: Asthma takes inhaler    Care plan reviewed with pt, pt verbalizes understanding at this time.

## 2024-07-31 NOTE — FLOWSHEET NOTE
Dr. Sanchez notified of pt admission. Pt is a  at 39.0 weeks here for repeat  section. Allergies reviewed - Plan for gentamicin and metronidazole. Pt with history of asthma has been sick over last few days - plan for Albuterol treatment and Respiratory therapy assessment. FHT reactive, irregular contractions. Orders received.

## 2024-07-31 NOTE — FLOWSHEET NOTE
Dr. RODNEY Sanchez, CRNA, Dr. Miller, and OR staff ready for patient. EFM monitors removed, abdomen clipped and prepped with CHG wipes and allowed time to dry. Patient transported in stable condition via bed to  OR2. Transferred to OR table and positioned for spinal placement. RN assisting patient with positioning.

## 2024-07-31 NOTE — RT PROTOCOL NOTE
RT Inhaler-Nebulizer Bronchodilator Protocol Note    There is a bronchodilator order in the chart from a provider indicating to follow the RT Bronchodilator Protocol and there is an “Initiate RT Inhaler-Nebulizer Bronchodilator Protocol” order as well (see protocol at bottom of note).    CXR Findings:  No results found.    The findings from the last RT Protocol Assessment were as follows:   History Pulmonary Disease: Chronic pulmonary disease (asthma)  Respiratory Pattern: Regular pattern and RR 12-20 bpm  Breath Sounds: Inspiratory and expiratory or bilateral wheezing and/or rhonchi  Cough: Strong, spontaneous, non-productive  Indication for Bronchodilator Therapy: Wheezing associated with pulm disorder  Bronchodilator Assessment Score: 8    Aerosolized bronchodilator medication orders have been revised according to the RT Inhaler-Nebulizer Bronchodilator Protocol below.    Respiratory Therapist to perform RT Therapy Protocol Assessment initially then follow the protocol.  Repeat RT Therapy Protocol Assessment PRN for score 0-3 or on second treatment, BID, and PRN for scores above 3.    No Indications - adjust the frequency to every 6 hours PRN wheezing or bronchospasm, if no treatments needed after 48 hours then discontinue using Per Protocol order mode.     If indication present, adjust the RT bronchodilator orders based on the Bronchodilator Assessment Score as indicated below.  Use Inhaler orders unless patient has one or more of the following: on home nebulizer, not able to hold breath for 10 seconds, is not alert and oriented, cannot activate and use MDI correctly, or respiratory rate 25 breaths per minute or more, then use the equivalent nebulizer order(s) with same Frequency and PRN reasons based on the score.  If a patient is on this medication at home then do not decrease Frequency below that used at home.    0-3 - enter or revise RT bronchodilator order(s) to equivalent RT Bronchodilator order with

## 2024-07-31 NOTE — L&D DELIVERY NOTE
Neeta Blount [[id number]]      Labor Events     Labor: No   Steroids: None  Cervical Ripening Date/Time:      Antibiotics Received during Labor: Yes  Rupture Date/Time:  24 10:11:00   Rupture Type: AROM  Fluid Color: Clear  Fluid Odor: None  Fluid Volume: Moderate  Induction: None  Augmentation: None  Labor Complications: None              Anesthesia    Method: Spinal       Delivery Details      Delivery Date: 24 Delivery Time: 10:13:00   Delivery Type: , Low Transverse  Trial of Labor?: No   Categorization: Repeat   Priority: scheduled  Indications for : Prior Uterine Surgery       Skin Incision Type: Pfannenstiel  Uterine Incision: Low Transverse       [place] Presentation    Presentation: Vertex  _: Occiput  _: Anterior       Shoulder Dystocia    Shoulder Dystocia Present?: No       Assisted Delivery Details    Forceps Attempted?: No  Vacuum Extractor Attempted?: No                           Cord    Vessels: 3 Vessels  Complications: None  Delayed Cord Clamping?: Yes  Cord Clamped Date/Time: 2024 10:14:00  Cord Blood Disposition: Refrigerator  Gases Sent?: No              Placenta    Date/Time: 2024 10:15:00  Removal: Spontaneous  Appearance: Intact  Disposition: Placenta Refrigerator       Lacerations           Blood Loss  Mother: Jose Alejandro Rahel R #[id number]     Start of Mother's Information      Delivery Blood Loss  24 0922 - 24 1113      Quantitative Blood Loss (mL) Hospital Encounter 765 grams    Total  765 mL               End of Mother's Information  Mother: Rahel Blount #[id number]                Delivery Providers    Delivering clinician: Angeles Sanchez MD     Provider Role    Angie Burgos RN Delivery Nurse    Theresa Rois RN Nursery Nurse    Jaquelin Javier Respiratory Therapist    Otilio Garcia DO Anesthesiologist    Viktoria Rollins, APRN - CRNA Nurse Anesthetist    Angela

## 2024-07-31 NOTE — FLOWSHEET NOTE
Patients recovery period over. Sonya/ching care provided. Ching securement device applied. Clean gown applied. Repositioned in bed. Call light and belongings in reach.

## 2024-07-31 NOTE — FLOWSHEET NOTE
Surgery over. Patient transferred from OR table to postpartum bed. Transported in stable condition via bed, with baby skin to skin, to [facility] for recovery. Alert and oriented, denies pain at this time. Monitors applied.

## 2024-07-31 NOTE — FLOWSHEET NOTE
RN notified Dr. RODNEY Sanchez via phone that dressing was saturated following an emesis episode. Per Dr. RODNEY Sanchez ok for RN to perform sterile dressing change. RN removed saturated dressing and reapplied a new silver foam dressing utilizing sterile technique. Patients recovery period over. Bedside handoff to Deshawn VAZ. Documented belongings at bedside. BLE SCDs on and functioning. [place] draining.

## 2024-07-31 NOTE — PLAN OF CARE
Problem: Pain  Goal: Verbalizes/displays adequate comfort level or baseline comfort level  2024 by Angie Burgos RN  Outcome: Progressing  Flowsheets (Taken 2024)  Verbalizes/displays adequate comfort level or baseline comfort level:   Encourage patient to monitor pain and request assistance   Implement non-pharmacological measures as appropriate and evaluate response   Assess pain using appropriate pain scale   Administer analgesics based on type and severity of pain and evaluate response     Problem: Vaginal Birth or  Section  Goal: Fetal and maternal status remain reassuring during the birth process  Description:  Birth OB-Pregnancy care plan goal which identifies if the fetal and maternal status remain reassuring during the birth process  2024 by Angie Burgos RN  Outcome: Progressing  Flowsheets (Taken 2024)  Fetal and Maternal Status Remain Reassuring During the Birth Process:   Monitor vital signs   Deep vein thrombosis prophylaxis ( delivery)   Monitor fetal heart rate   Monitor uterine activity   Surgical antibiotic prophylaxis ( delivery)     Problem: Infection - Adult  Goal: Absence of infection at discharge  2024 by Angie Burgos RN  Outcome: Progressing  Flowsheets (Taken 2024)  Absence of infection at discharge:   Assess and monitor for signs and symptoms of infection   Monitor lab/diagnostic results   Administer medications as ordered   Instruct and encourage patient and family to use good hand hygiene technique   Monitor all insertion sites i.e., indwelling lines, tubes and drains     Problem: Safety - Adult  Goal: Free from fall injury  2024 by Angie Burgos RN  Outcome: Progressing  Flowsheets (Taken 2024)  Free From Fall Injury: Instruct family/caregiver on patient safety     Problem: Discharge Planning  Goal: Discharge to home or other facility with appropriate

## 2024-07-31 NOTE — FLOWSHEET NOTE
Patient having uncontrollable shaking, difficulty assessing BP with shaking. RN applied warm blankets. Lights dimmed. Patient encouraged to relax. Denies any pain at this time. Respirations easy and unlabored. Given a drink of water.

## 2024-07-31 NOTE — H&P
[facility]  History and Physical Update    Pt Name: Rahel Blount  MRN: [id number]  YOB: 1990  Date of evaluation: 2024    [] I have examined the patient and reviewed the H&P/Consult and there are no changes to the patient or plans.    [x] I have examined the patient and reviewed the H&P/Consult and have noted the following changes:   35 yo  at 39 weeks with 4 prior csections here for repeat csection  Hx asthma s/p exacerbation this past weekend  Also has sickle trait and SMA carrier  FHTs reactive  Ctx irreg    Plan for csection. Specifically other than nl r/b also risk of transfusion and risk of hystercetomy because of her 4 prior csections    Discussion with the patient and/ or family for proposed care, treatment, services; benefits, risks, side effects; likelihood of achieving goals and potential problems that may occur during recuperation was had and all questions were answered.  Discussion with the patient and/ or family of reasonable alternatives to the proposed care, treatment, services and the discussion of the risks, benefits, side effects related to the alternatives and the risk related to not receiving the proposed care treatment services was also had and all questions were answered.    If this is for an elective surgical procedure then The patient was counseled at length about the risks of brisa Covid-19 during their perioperative period and any recovery window from their procedure.  The patient was made aware that birsa Covid-19  may worsen their prognosis for recovering from their procedure  and lend to a higher morbidity and/or mortality risk.  All material risks, benefits, and reasonable alternatives including postponing the procedure were discussed. The patient  does wish to proceed with the procedure at this time.             Angeles Sanchez MD,MD  Electronically signed 2024 at 9:09 AM

## 2024-07-31 NOTE — FLOWSHEET NOTE
Pt arrives to 5C10 for scheduled repeat  section. Pt denies contractions, leaking of fluids or bleeding from the vagina. + fetal movement noted per pt. Pt oriented to room and call light system, clean gown provided. Patient to bathroom to void, informed of maternal drug testing policy in place on all laboring patients. Verbal consent received, paper consent to be signed and urine to be sent.

## 2024-07-31 NOTE — ANESTHESIA PRE PROCEDURE
Department of Anesthesiology  Preprocedure Note       Name:  Rahel Blount   Age:  34 y.o.  :  1990                                          MRN:  [id number]         Date:  2024      Surgeon: Surgeon(s):  Angeles Sanchez MD    Procedure: Procedure(s):  REPEAT  SECTION    Medications prior to admission:   Prior to Admission medications    Medication Sig Start Date End Date Taking? Authorizing Provider   predniSONE (DELTASONE) 20 MG tablet Take 1 tablet by mouth 2 times daily for 4 days 24  Syed Vasquez,    albuterol (PROVENTIL) (2.5 MG/3ML) 0.083% nebulizer solution Take 3 mLs by nebulization 4 times daily as needed for Wheezing 2/15/24   Eren Carrillo,    ondansetron (ZOFRAN-ODT) 4 MG disintegrating tablet Take 1 tablet by mouth every 8 hours as needed for Nausea or Vomiting  Patient not taking: Reported on 2024   Debbie Parikh PA-C   Prenatal MV-Min-Fe Fum-FA-DHA (PRENATAL 1 PO) Take by mouth  Patient not taking: Reported on 2024    Jarret Giles MD   albuterol (PROVENTIL) (2.5 MG/3ML) 0.083% nebulizer solution Take 3 mLs by nebulization every 6 hours as needed for Wheezing 10/23/22   Conor Alas, APRN - CNP   acetaminophen (TYLENOL) 500 MG tablet Take 2 tablets by mouth every 6 hours as needed for Pain  Patient not taking: Reported on 2024    Jarret Giles MD   ATROVENT HFA 17 MCG/ACT inhaler 2 puffs as needed 2/26/19 10/18/21  Jarret Giles MD       Current medications:    Current Facility-Administered Medications   Medication Dose Route Frequency Provider Last Rate Last Admin   • lactated ringers IV soln infusion   IntraVENous Continuous Angeles Sanchez MD       • lactated ringers bolus 1,000 mL  1,000 mL IntraVENous Once Angeles Sanchez  mL/hr at 24 0654 1,000 mL at 24 0654   • famotidine (PEPCID) 20 mg in sodium chloride (PF) 0.9 % 10 mL injection  20 mg

## 2024-07-31 NOTE — PLAN OF CARE
Problem: Respiratory - Adult  Goal: Clear lung sounds  2024 171 by Marylou Solares, RN  Outcome: Progressing  Note: RT nebulizer PRN     Problem: Postpartum  Goal: Experiences normal postpartum course  Description:  Postpartum OB-Pregnancy care plan goal which identifies if the mother is experiencing a normal postpartum course  Outcome: Progressing  Flowsheets (Taken 2024)  Experiences Normal Postpartum Course:   Monitor maternal vital signs   Assess uterine involution     Problem: Postpartum  Goal: Appropriate maternal -  bonding  Description:  Postpartum OB-Pregnancy care plan goal which identifies if the mother and  are bonding appropriately  Outcome: Progressing  Note: Bonding with baby, participating in infant care.      Problem: Postpartum  Goal: Establishment of infant feeding pattern  Description:  Postpartum OB-Pregnancy care plan goal which identifies if the mother is establishing a feeding pattern with their   Outcome: Progressing  Flowsheets (Taken 2024)  Establishment of Infant Feeding Pattern:   Assess breast/bottle feeding   Refer to lactation as needed     Problem: Postpartum  Goal: Incisions, wounds, or drain sites healing without S/S of infection  Outcome: Progressing  Flowsheets (Taken 2024)  Incisions, Wounds, or Drain Sites Healing Without Sign and Symptoms of Infection: TWICE DAILY: Assess and document dressing/incision, wound bed, drain sites and surrounding tissue     Problem: Pain  Goal: Verbalizes/displays adequate comfort level or baseline comfort level  Outcome: Progressing  Flowsheets  Taken 2024 1328 by Marylou Solares, RN  Verbalizes/displays adequate comfort level or baseline comfort level:   Encourage patient to monitor pain and request assistance   Assess pain using appropriate pain scale   Administer analgesics based on type and severity of pain and evaluate response   Implement non-pharmacological measures as appropriate  7/31/2024 0753 by Angie Burgos RN  Free From Fall Injury: Instruct family/caregiver on patient safety  Taken 7/31/2024 0732 by Elsa Alba RN  Free From Fall Injury: Instruct family/caregiver on patient safety     Problem: Discharge Planning  Goal: Discharge to home or other facility with appropriate resources  Outcome: Progressing  Flowsheets  Taken 7/31/2024 1328 by Marylou Solares RN  Discharge to home or other facility with appropriate resources: Identify barriers to discharge with patient and caregiver  Taken 7/31/2024 0753 by Angie Burgos RN  Discharge to home or other facility with appropriate resources:   Identify barriers to discharge with patient and caregiver   Arrange for needed discharge resources and transportation as appropriate   Identify discharge learning needs (meds, wound care, etc)  Taken 7/31/2024 0732 by Esla Alba RN  Discharge to home or other facility with appropriate resources:   Identify barriers to discharge with patient and caregiver   Arrange for needed discharge resources and transportation as appropriate     Problem: Chronic Conditions and Co-morbidities  Goal: Patient's chronic conditions and co-morbidity symptoms are monitored and maintained or improved  Outcome: Progressing  Flowsheets  Taken 7/31/2024 1719 by Marylou Solares RN  Care Plan - Patient's Chronic Conditions and Co-Morbidity Symptoms are Monitored and Maintained or Improved: Monitor and assess patient's chronic conditions and comorbid symptoms for stability, deterioration, or improvement  Taken 7/31/2024 0753 by Angie Burgos RN  Care Plan - Patient's Chronic Conditions and Co-Morbidity Symptoms are Monitored and Maintained or Improved:   Monitor and assess patient's chronic conditions and comorbid symptoms for stability, deterioration, or improvement   Collaborate with multidisciplinary team to address chronic and comorbid conditions and prevent exacerbation or

## 2024-07-31 NOTE — FLOWSHEET NOTE
Bedside report received in room 5B24. Patient oriented to room, call light, ducks in a row, hourly rounding, M in the Box, Menu and ordering, Postpartum and  Care booklet given, VIS for Tdap, Welcome Letter, Medication info sheet. Patient verbalizes understanding.    [Well Developed] : well developed [Well Nourished] : well nourished [No Acute Distress] : no acute distress [Normal Conjunctiva] : normal conjunctiva [Normal Venous Pressure] : normal venous pressure [No Carotid Bruit] : no carotid bruit [Normal S1, S2] : normal S1, S2 [No Rub] : no rub [No Gallop] : no gallop [Clear Lung Fields] : clear lung fields (0) understands/communicates without difficulty [Good Air Entry] : good air entry [No Respiratory Distress] : no respiratory distress  [Soft] : abdomen soft [Non Tender] : non-tender [No Masses/organomegaly] : no masses/organomegaly [Normal Bowel Sounds] : normal bowel sounds [Normal Gait] : normal gait [No Cyanosis] : no cyanosis [No Clubbing] : no clubbing [No Varicosities] : no varicosities [Moves all extremities] : moves all extremities [No Focal Deficits] : no focal deficits [Normal Speech] : normal speech [Alert and Oriented] : alert and oriented [Normal memory] : normal memory [de-identified] : +2/6 RADHA [de-identified] : Diminished on right [de-identified] : Tr b/l pretibial edema

## 2024-07-31 NOTE — ANESTHESIA PROCEDURE NOTES
Spinal Block    Patient location during procedure: OB  End time: 7/31/2024 9:36 AM  Reason for block: primary anesthetic  Staffing  Performed: resident/CRNA   Anesthesiologist: Otilio Garcia DO  Resident/CRNA: Viktoria Rollins APRN - CRNA  Performed by: Viktoria Rollins APRN - CRNA  Authorized by: Otilio Garcia DO    Spinal Block  Patient position: sitting  Prep: ChloraPrep and site prepped and draped  Patient monitoring: continuous pulse ox and frequent blood pressure checks  Approach: midline  Location: L2/L3  Guidance: paresthesia technique  Provider prep: sterile gloves and mask  Local infiltration: lidocaine  Needle  Needle type: Doretha   Needle gauge: 25 G  Needle length: 3.5 in  Assessment  Sensory level: T6  Swirl obtained: Yes  CSF: clear  Attempts: 2  Hemodynamics: stable  Preanesthetic Checklist  Completed: patient identified, IV checked, site marked, risks and benefits discussed, surgical/procedural consents, equipment checked, pre-op evaluation, timeout performed, anesthesia consent given, oxygen available, monitors applied/VS acknowledged, fire risk safety assessment completed and verbalized and blood product R/B/A discussed and consented

## 2024-08-01 LAB — HGB BLD-MCNC: 9.5 GM/DL (ref 12–16)

## 2024-08-01 PROCEDURE — 1220000000 HC SEMI PRIVATE OB R&B

## 2024-08-01 PROCEDURE — 36415 COLL VENOUS BLD VENIPUNCTURE: CPT

## 2024-08-01 PROCEDURE — 6370000000 HC RX 637 (ALT 250 FOR IP): Performed by: OBSTETRICS & GYNECOLOGY

## 2024-08-01 PROCEDURE — 6360000002 HC RX W HCPCS: Performed by: OBSTETRICS & GYNECOLOGY

## 2024-08-01 PROCEDURE — 94640 AIRWAY INHALATION TREATMENT: CPT

## 2024-08-01 PROCEDURE — 85018 HEMOGLOBIN: CPT

## 2024-08-01 PROCEDURE — 2580000003 HC RX 258: Performed by: OBSTETRICS & GYNECOLOGY

## 2024-08-01 PROCEDURE — 94761 N-INVAS EAR/PLS OXIMETRY MLT: CPT

## 2024-08-01 PROCEDURE — 6370000000 HC RX 637 (ALT 250 FOR IP): Performed by: ANESTHESIOLOGY

## 2024-08-01 RX ORDER — ALBUTEROL SULFATE 2.5 MG/3ML
2.5 SOLUTION RESPIRATORY (INHALATION)
Status: DISCONTINUED | OUTPATIENT
Start: 2024-08-02 | End: 2024-08-02

## 2024-08-01 RX ORDER — IBUPROFEN 800 MG/1
800 TABLET ORAL 3 TIMES DAILY
Status: DISCONTINUED | OUTPATIENT
Start: 2024-08-01 | End: 2024-08-02 | Stop reason: HOSPADM

## 2024-08-01 RX ADMIN — ALBUTEROL SULFATE 2.5 MG: 2.5 SOLUTION RESPIRATORY (INHALATION) at 00:57

## 2024-08-01 RX ADMIN — ENOXAPARIN SODIUM 30 MG: 100 INJECTION SUBCUTANEOUS at 20:17

## 2024-08-01 RX ADMIN — OXYCODONE 5 MG: 5 TABLET ORAL at 09:36

## 2024-08-01 RX ADMIN — ALBUTEROL SULFATE 2.5 MG: 2.5 SOLUTION RESPIRATORY (INHALATION) at 19:46

## 2024-08-01 RX ADMIN — ACETAMINOPHEN 1000 MG: 500 TABLET ORAL at 09:35

## 2024-08-01 RX ADMIN — ALBUTEROL SULFATE 2.5 MG: 2.5 SOLUTION RESPIRATORY (INHALATION) at 10:40

## 2024-08-01 RX ADMIN — SODIUM CHLORIDE, POTASSIUM CHLORIDE, SODIUM LACTATE AND CALCIUM CHLORIDE: 600; 310; 30; 20 INJECTION, SOLUTION INTRAVENOUS at 04:25

## 2024-08-01 RX ADMIN — FAMOTIDINE 20 MG: 20 TABLET, FILM COATED ORAL at 08:16

## 2024-08-01 RX ADMIN — ALBUTEROL SULFATE 2.5 MG: 2.5 SOLUTION RESPIRATORY (INHALATION) at 14:21

## 2024-08-01 RX ADMIN — PREDNISONE 20 MG: 20 TABLET ORAL at 20:17

## 2024-08-01 RX ADMIN — OXYCODONE 5 MG: 5 TABLET ORAL at 04:23

## 2024-08-01 RX ADMIN — ENOXAPARIN SODIUM 30 MG: 100 INJECTION SUBCUTANEOUS at 08:15

## 2024-08-01 RX ADMIN — OXYCODONE 5 MG: 5 TABLET ORAL at 20:17

## 2024-08-01 RX ADMIN — DOCUSATE SODIUM 100 MG: 100 CAPSULE, LIQUID FILLED ORAL at 08:15

## 2024-08-01 RX ADMIN — ACETAMINOPHEN 650 MG: 325 TABLET ORAL at 04:21

## 2024-08-01 RX ADMIN — SIMETHICONE 80 MG: 80 TABLET, CHEWABLE ORAL at 08:15

## 2024-08-01 RX ADMIN — PREDNISONE 20 MG: 20 TABLET ORAL at 08:16

## 2024-08-01 RX ADMIN — PRENATAL WITH FERROUS FUM AND FOLIC ACID 1 TABLET: 3080; 920; 120; 400; 22; 1.84; 3; 20; 10; 1; 12; 200; 27; 25; 2 TABLET ORAL at 08:15

## 2024-08-01 RX ADMIN — METRONIDAZOLE 500 MG: 500 TABLET ORAL at 13:42

## 2024-08-01 RX ADMIN — DOCUSATE SODIUM 100 MG: 100 CAPSULE, LIQUID FILLED ORAL at 20:17

## 2024-08-01 RX ADMIN — IBUPROFEN 800 MG: 800 TABLET, FILM COATED ORAL at 13:42

## 2024-08-01 RX ADMIN — OXYCODONE 5 MG: 5 TABLET ORAL at 13:42

## 2024-08-01 RX ADMIN — ACETAMINOPHEN 1000 MG: 500 TABLET ORAL at 20:17

## 2024-08-01 RX ADMIN — METRONIDAZOLE 500 MG: 500 TABLET ORAL at 06:19

## 2024-08-01 ASSESSMENT — PAIN SCALES - GENERAL
PAINLEVEL_OUTOF10: 7
PAINLEVEL_OUTOF10: 5
PAINLEVEL_OUTOF10: 5

## 2024-08-01 ASSESSMENT — PAIN DESCRIPTION - LOCATION
LOCATION: ABDOMEN;INCISION
LOCATION: ABDOMEN;INCISION

## 2024-08-01 ASSESSMENT — PAIN DESCRIPTION - DESCRIPTORS
DESCRIPTORS: ACHING;SORE
DESCRIPTORS: ACHING;SORE

## 2024-08-01 NOTE — FLOWSHEET NOTE
Dr Bland called unit at this time. Notified of pts silver dressing saturated after being changed around 2250. RN stated that dressing has already been changed since after c/section. Bps and HR reviewed with MD. Fundus firm, scant amount of vaginal bleeding noted. Abdominal binder in place. Dr Bland stated to place a pressure dressing on incision.

## 2024-08-01 NOTE — PLAN OF CARE
Administer analgesics based on type and severity of pain and evaluate response   Assess pain using appropriate pain scale   Implement non-pharmacological measures as appropriate and evaluate response   Consider cultural and social influences on pain and pain management     Problem: Infection - Adult  Goal: Absence of infection at discharge  7/31/2024 2221 by Madelaine Javier, RN  Outcome: Progressing  Flowsheets (Taken 7/31/2024 2221)  Absence of infection at discharge:   Assess and monitor for signs and symptoms of infection   Monitor lab/diagnostic results   Monitor all insertion sites i.e., indwelling lines, tubes and drains     Problem: Safety - Adult  Goal: Free from fall injury  7/31/2024 2221 by Madelaine Javier, RN  Outcome: Progressing  Flowsheets (Taken 7/31/2024 2221)  Free From Fall Injury: Instruct family/caregiver on patient safety     Problem: Discharge Planning  Goal: Discharge to home or other facility with appropriate resources  7/31/2024 2221 by Madelaine Javier, RN  Outcome: Progressing  Flowsheets (Taken 7/31/2024 2221)  Discharge to home or other facility with appropriate resources: Identify barriers to discharge with patient and caregiver     Problem: Chronic Conditions and Co-morbidities  Goal: Patient's chronic conditions and co-morbidity symptoms are monitored and maintained or improved  7/31/2024 2221 by Madelaine Javier, RN  Outcome: Progressing  Flowsheets (Taken 7/31/2024 2221)  Care Plan - Patient's Chronic Conditions and Co-Morbidity Symptoms are Monitored and Maintained or Improved:   Monitor and assess patient's chronic conditions and comorbid symptoms for stability, deterioration, or improvement   Collaborate with multidisciplinary team to address chronic and comorbid conditions and prevent exacerbation or deterioration   Care plan reviewed with patient.  Patient verbalizes understanding of the care plan and contributed to goal setting.

## 2024-08-01 NOTE — PROGRESS NOTES
[address] [address]                             NON STRESS TEST      PATIENT NAME: ADEEL CHAPPELL           : 1990  MED REC NO: [id number]                       ROOM:   ACCOUNT NO: [id number]                       ADMIT DATE: 2024  PROVIDER: Padmini Sanchez MD      DATE OF STUDY:  2024    Patient is a 34-year-old G5, P4 at 38 weeks' gestation, who presented with an asthma exacerbation.  While being evaluated, she had a reactive NST to evaluate the fetus.  When her evaluation was complete, she was allowed to be discharged home to follow up in the office.          PADMINI SANCHEZ MD      D:  2024 18:48:56     T:  2024 20:41:07     VLS/HAZELS  Job #:  [id number]     Doc#:  [id number]

## 2024-08-01 NOTE — ANESTHESIA POSTPROCEDURE EVALUATION
Department of Anesthesiology  Postprocedure Note    Patient: Rahel Blount  MRN: [id number]  YOB: 1990  Date of evaluation: 2024    Procedure Summary       Date: 24 Room / Location: [address]    Anesthesia Start: 922 Anesthesia Stop:     Procedure: REPEAT  SECTION (Uterus) Diagnosis:       Previous  section      (Previous  section [Z98.891])    Surgeons: Angeles Sanchez MD Responsible Provider: Otilio Garcia DO    Anesthesia Type: spinal ASA Status: 2            Anesthesia Type: No value filed.    Katharine Phase I: Katharine Score: 9    Katharine Phase II: Katharine Score: 10    Anesthesia Post Evaluation    Patient location during evaluation: floor  Patient participation: complete - patient participated  Level of consciousness: awake and alert  Airway patency: patent  Nausea & Vomiting: no nausea and no vomiting  Cardiovascular status: hemodynamically stable  Respiratory status: acceptable and spontaneous ventilation  Hydration status: euvolemic  Comments: Patient voices no issues or concerns at this time.   Pain management: satisfactory to patient        No notable events documented.

## 2024-08-01 NOTE — RT PROTOCOL NOTE
RT Nebulizer Bronchodilator Protocol Note    There is a bronchodilator order in the chart from a provider indicating to follow the RT Bronchodilator Protocol and there is an “Initiate RT Bronchodilator Protocol” order as well (see protocol at bottom of note).    CXR Findings:  No results found.    The findings from the last RT Protocol Assessment were as follows:  Smoking: Chronic pulmonary disease  Respiratory Pattern: Regular pattern and RR 12-20 bpm  Breath Sounds: Intermittent or unilateral wheezes  Cough: Strong, productive  Indication for Bronchodilator Therapy: On home bronchodilators, Decreased or absent breath sounds  Bronchodilator Assessment Score: 7    Aerosolized bronchodilator medication orders have been revised according to the RT Nebulizer Bronchodilator Protocol below.    Respiratory Therapist to perform RT Therapy Protocol Assessment initially then follow the protocol.  Repeat RT Therapy Protocol Assessment PRN for score 0-3 or on second treatment, BID, and PRN for scores above 3.    No Indications - adjust the frequency to every 6 hours PRN wheezing or bronchospasm, if no treatments needed after 48 hours then discontinue using Per Protocol order mode.     If indication present, adjust the RT bronchodilator orders based on the Bronchodilator Assessment Score as indicated below.  If a patient is on this medication at home then do not decrease Frequency below that used at home.    0-3 - enter or revise RT bronchodilator order(s) to equivalent RT Bronchodilator order with Frequency of every 4 hours PRN for wheezing or increased work of breathing using Per Protocol order mode.       4-6 - enter or revise RT Bronchodilator order(s) to two equivalent RT bronchodilator orders with one order with BID Frequency and one order with Frequency of every 4 hours PRN wheezing or increased work of breathing using Per Protocol order mode.         7-10 - enter or revise RT Bronchodilator order(s) to two equivalent RT

## 2024-08-01 NOTE — RT PROTOCOL NOTE
RT Nebulizer Bronchodilator Protocol Note    There is a bronchodilator order in the chart from a provider indicating to follow the RT Bronchodilator Protocol and there is an “Initiate RT Bronchodilator Protocol” order as well (see protocol at bottom of note).    CXR Findings:  No results found.    The findings from the last RT Protocol Assessment were as follows:  Smoking: Chronic pulmonary disease  Respiratory Pattern: Regular pattern and RR 12-20 bpm  Breath Sounds: Slightly diminished and/or crackles  Cough: Strong, spontaneous, non-productive  Indication for Bronchodilator Therapy: On home bronchodilators, Decreased or absent breath sounds  Bronchodilator Assessment Score: 4    Aerosolized bronchodilator medication orders have been revised according to the RT Nebulizer Bronchodilator Protocol below.    Respiratory Therapist to perform RT Therapy Protocol Assessment initially then follow the protocol.  Repeat RT Therapy Protocol Assessment PRN for score 0-3 or on second treatment, BID, and PRN for scores above 3.    No Indications - adjust the frequency to every 6 hours PRN wheezing or bronchospasm, if no treatments needed after 48 hours then discontinue using Per Protocol order mode.     If indication present, adjust the RT bronchodilator orders based on the Bronchodilator Assessment Score as indicated below.  If a patient is on this medication at home then do not decrease Frequency below that used at home.    0-3 - enter or revise RT bronchodilator order(s) to equivalent RT Bronchodilator order with Frequency of every 4 hours PRN for wheezing or increased work of breathing using Per Protocol order mode.       4-6 - enter or revise RT Bronchodilator order(s) to two equivalent RT bronchodilator orders with one order with BID Frequency and one order with Frequency of every 4 hours PRN wheezing or increased work of breathing using Per Protocol order mode.         7-10 - enter or revise RT Bronchodilator order(s) to  two equivalent RT bronchodilator orders with one order with TID Frequency and one order with Frequency of every 4 hours PRN wheezing or increased work of breathing using Per Protocol order mode.       11-13 - enter or revise RT Bronchodilator order(s) to one equivalent RT bronchodilator order with QID Frequency and an Albuterol order with Frequency of every 4 hours PRN wheezing or increased work of breathing using Per Protocol order mode.      Greater than 13 - enter or revise RT Bronchodilator order(s) to one equivalent RT bronchodilator order with every 4 hours Frequency and an Albuterol order with Frequency of every 2 hours PRN wheezing or increased work of breathing using Per Protocol order mode.     RT to enter RT Home Evaluation for COPD & MDI Assessment order using Per Protocol order mode.    Electronically signed by Maria Esther Lemos RCP on 8/1/2024 at 7:50 PM

## 2024-08-01 NOTE — PLAN OF CARE
Problem: Respiratory - Adult  Goal: Clear lung sounds  2024 by Marylou Solares RN  Outcome: Progressing  Note: RT nebulizer twice daily     Problem: Postpartum  Goal: Experiences normal postpartum course  Description:  Postpartum OB-Pregnancy care plan goal which identifies if the mother is experiencing a normal postpartum course  2024 104 by Marylou Solares RN  Outcome: Progressing  Flowsheets (Taken 2024 0809)  Experiences Normal Postpartum Course:   Monitor maternal vital signs   Assess uterine involution     Problem: Postpartum  Goal: Appropriate maternal -  bonding  Description:  Postpartum OB-Pregnancy care plan goal which identifies if the mother and  are bonding appropriately  2024 by Marylou Solares RN  Outcome: Progressing  Note: Bonding with baby, participating in infant care.      Problem: Postpartum  Goal: Establishment of infant feeding pattern  Description:  Postpartum OB-Pregnancy care plan goal which identifies if the mother is establishing a feeding pattern with their   2024 by Marylou Solares RN  Outcome: Progressing  Flowsheets (Taken 2024 0809)  Establishment of Infant Feeding Pattern:   Assess breast/bottle feeding   Refer to lactation as needed     Problem: Postpartum  Goal: Incisions, wounds, or drain sites healing without S/S of infection  2024 by Marylou Solares RN  Outcome: Progressing  Flowsheets (Taken 2024 0809)  Incisions, Wounds, or Drain Sites Healing Without Sign and Symptoms of Infection: TWICE DAILY: Assess and document dressing/incision, wound bed, drain sites and surrounding tissue     Problem: Pain  Goal: Verbalizes/displays adequate comfort level or baseline comfort level  2024 104 by Marylou Solares RN  Outcome: Progressing  Flowsheets (Taken 2024 0809)  Verbalizes/displays adequate comfort level or baseline comfort level:   Encourage patient to monitor pain and request assistance   Assess pain

## 2024-08-01 NOTE — FLOWSHEET NOTE
Pt up to bathroom at this time with nurse assist. Pt ambulated well. Small amount of lochia noted in toilet. Fundus firm/midline/U-1 when back in bed. Pt due to void x1 with RN.

## 2024-08-01 NOTE — FLOWSHEET NOTE
Pts silver dressing saturated and peeling off. Per Dr ANDREA hardy for nurse to change dressing if saturated. Dressing changed in sterile fashion by this RN.

## 2024-08-01 NOTE — FLOWSHEET NOTE
Pt dangled on side of bed at this time. Pt ambulated to bathroom with assist from nurse. Med[place] catheter removed at this time. Pt unable to urinate at this time. Pt educated on dejon bottle use. New pad and underwear applied. Scant amount of pink blood noted in toilet. Small clot noted in toilet. Fundus firm, U/-1, no bleeding noted after pt returned to bed. Pt verbalizes understanding of calling nurse to go to the bathroom.

## 2024-08-01 NOTE — FLOWSHEET NOTE
Pt up to the bathroom with nurse assist. Pt ambulated well to bathroom. Small amount of lochia in toilet. Fundus firm, midline U-1.

## 2024-08-01 NOTE — FLOWSHEET NOTE
Pressure dressing applied in sterile fashion. No active bleeding from incision noted. Abdominal binder reapplied.

## 2024-08-02 VITALS
DIASTOLIC BLOOD PRESSURE: 68 MMHG | TEMPERATURE: 97.9 F | HEART RATE: 76 BPM | OXYGEN SATURATION: 97 % | HEIGHT: 69 IN | WEIGHT: 280 LBS | SYSTOLIC BLOOD PRESSURE: 131 MMHG | BODY MASS INDEX: 41.47 KG/M2 | RESPIRATION RATE: 16 BRPM

## 2024-08-02 PROBLEM — E66.01 CLASS 3 SEVERE OBESITY DUE TO EXCESS CALORIES IN ADULT (HCC): Status: ACTIVE | Noted: 2024-08-02

## 2024-08-02 PROBLEM — E66.813 CLASS 3 SEVERE OBESITY DUE TO EXCESS CALORIES IN ADULT: Status: ACTIVE | Noted: 2024-08-02

## 2024-08-02 LAB
DEPRECATED RDW RBC AUTO: 42.5 FL (ref 35–45)
ERYTHROCYTE [DISTWIDTH] IN BLOOD BY AUTOMATED COUNT: 14.8 % (ref 11.5–14.5)
HCT VFR BLD AUTO: 28 % (ref 37–47)
HGB BLD-MCNC: 9 GM/DL (ref 12–16)
MCH RBC QN AUTO: 25.6 PG (ref 26–33)
MCHC RBC AUTO-ENTMCNC: 32.1 GM/DL (ref 32.2–35.5)
MCV RBC AUTO: 79.5 FL (ref 81–99)
PLATELET # BLD AUTO: 189 THOU/MM3 (ref 130–400)
PMV BLD AUTO: 12.6 FL (ref 9.4–12.4)
RBC # BLD AUTO: 3.52 MILL/MM3 (ref 4.2–5.4)
WBC # BLD AUTO: 14.6 THOU/MM3 (ref 4.8–10.8)

## 2024-08-02 PROCEDURE — 94640 AIRWAY INHALATION TREATMENT: CPT

## 2024-08-02 PROCEDURE — 6370000000 HC RX 637 (ALT 250 FOR IP)

## 2024-08-02 PROCEDURE — 36415 COLL VENOUS BLD VENIPUNCTURE: CPT

## 2024-08-02 PROCEDURE — 6360000002 HC RX W HCPCS: Performed by: OBSTETRICS & GYNECOLOGY

## 2024-08-02 PROCEDURE — 85027 COMPLETE CBC AUTOMATED: CPT

## 2024-08-02 PROCEDURE — 6370000000 HC RX 637 (ALT 250 FOR IP): Performed by: OBSTETRICS & GYNECOLOGY

## 2024-08-02 PROCEDURE — 94761 N-INVAS EAR/PLS OXIMETRY MLT: CPT

## 2024-08-02 RX ORDER — OXYCODONE HYDROCHLORIDE AND ACETAMINOPHEN 5; 325 MG/1; MG/1
1 TABLET ORAL EVERY 6 HOURS PRN
Qty: 28 TABLET | Refills: 0 | Status: SHIPPED | OUTPATIENT
Start: 2024-08-02 | End: 2024-08-09

## 2024-08-02 RX ORDER — PSEUDOEPHEDRINE HCL 30 MG
100 TABLET ORAL 2 TIMES DAILY
Qty: 60 CAPSULE | Refills: 0
Start: 2024-08-02

## 2024-08-02 RX ORDER — IBUPROFEN 200 MG
TABLET ORAL 2 TIMES DAILY
Status: DISCONTINUED | OUTPATIENT
Start: 2024-08-02 | End: 2024-08-02 | Stop reason: HOSPADM

## 2024-08-02 RX ORDER — IBUPROFEN 800 MG/1
800 TABLET ORAL 3 TIMES DAILY
Qty: 120 TABLET | Refills: 1 | Status: SHIPPED | OUTPATIENT
Start: 2024-08-02 | End: 2024-10-21

## 2024-08-02 RX ORDER — ALBUTEROL SULFATE 2.5 MG/3ML
2.5 SOLUTION RESPIRATORY (INHALATION) EVERY 4 HOURS PRN
Status: DISCONTINUED | OUTPATIENT
Start: 2024-08-02 | End: 2024-08-02 | Stop reason: HOSPADM

## 2024-08-02 RX ADMIN — OXYCODONE 5 MG: 5 TABLET ORAL at 00:10

## 2024-08-02 RX ADMIN — METRONIDAZOLE 500 MG: 500 TABLET ORAL at 06:28

## 2024-08-02 RX ADMIN — OXYCODONE 5 MG: 5 TABLET ORAL at 08:16

## 2024-08-02 RX ADMIN — ACETAMINOPHEN 1000 MG: 500 TABLET ORAL at 12:47

## 2024-08-02 RX ADMIN — IBUPROFEN 800 MG: 800 TABLET, FILM COATED ORAL at 00:10

## 2024-08-02 RX ADMIN — PRENATAL WITH FERROUS FUM AND FOLIC ACID 1 TABLET: 3080; 920; 120; 400; 22; 1.84; 3; 20; 10; 1; 12; 200; 27; 25; 2 TABLET ORAL at 08:16

## 2024-08-02 RX ADMIN — BACITRACIN ZINC NEOMYCIN SULFATE POLYMYXIN B SULFATE: 400; 3.5; 5 OINTMENT TOPICAL at 11:49

## 2024-08-02 RX ADMIN — METRONIDAZOLE 500 MG: 500 TABLET ORAL at 00:10

## 2024-08-02 RX ADMIN — OXYCODONE 5 MG: 5 TABLET ORAL at 04:28

## 2024-08-02 RX ADMIN — ENOXAPARIN SODIUM 30 MG: 100 INJECTION SUBCUTANEOUS at 09:37

## 2024-08-02 RX ADMIN — OXYCODONE 5 MG: 5 TABLET ORAL at 12:47

## 2024-08-02 RX ADMIN — PREDNISONE 20 MG: 20 TABLET ORAL at 11:51

## 2024-08-02 RX ADMIN — IBUPROFEN 800 MG: 800 TABLET, FILM COATED ORAL at 16:46

## 2024-08-02 RX ADMIN — ACETAMINOPHEN 1000 MG: 500 TABLET ORAL at 04:27

## 2024-08-02 RX ADMIN — DOCUSATE SODIUM 100 MG: 100 CAPSULE, LIQUID FILLED ORAL at 08:16

## 2024-08-02 RX ADMIN — IBUPROFEN 800 MG: 800 TABLET, FILM COATED ORAL at 08:16

## 2024-08-02 RX ADMIN — ALBUTEROL SULFATE 2.5 MG: 2.5 SOLUTION RESPIRATORY (INHALATION) at 11:13

## 2024-08-02 ASSESSMENT — PAIN SCALES - GENERAL
PAINLEVEL_OUTOF10: 6
PAINLEVEL_OUTOF10: 5
PAINLEVEL_OUTOF10: 5

## 2024-08-02 ASSESSMENT — PAIN DESCRIPTION - LOCATION: LOCATION: ABDOMEN;INCISION

## 2024-08-02 ASSESSMENT — PAIN DESCRIPTION - DESCRIPTORS: DESCRIPTORS: ACHING;SORE

## 2024-08-02 NOTE — PROGRESS NOTES
Progress note    Subjective:     Postpartum Day 1:  Delivery    Pain is well controlled with current medications.  The patient is ambulating well. The patient is tolerating a normal diet. Flatus has been passed.    Objective:     Vitals:    24   BP: 135/66   Pulse: 70   Resp: 18   Temp: 97.8 °F (36.6 °C)   SpO2: 97%         General:    alert, appears stated age, and cooperative   Uterine Fundus:   firm   Incision:  covered Large amount of serous drainage over the last 24 hour requiring 4 dressing changes steriley done  Currently no active drainage.  Pressure dressing done        CBC   Lab Results   Component Value Date    HGB 9.5 (L) 2024        Assessment:     Status post  section. Doing well postoperatively. Watch incision. Pt desires discharge tomorrow. vs      Plan:     Advance diet. Remove IV. Remove ching.      Angeles Sanchez MD 2024 8:03 PM

## 2024-08-02 NOTE — PLAN OF CARE
Problem: Respiratory - Adult  Goal: Clear lung sounds  Outcome: Progressing  Note: wnl     Problem: Postpartum  Goal: Experiences normal postpartum course  Description:  Postpartum OB-Pregnancy care plan goal which identifies if the mother is experiencing a normal postpartum course  Outcome: Progressing  Flowsheets (Taken 2024)  Experiences Normal Postpartum Course:   Monitor maternal vital signs   Assess uterine involution     Problem: Postpartum  Goal: Appropriate maternal -  bonding  Description:  Postpartum OB-Pregnancy care plan goal which identifies if the mother and  are bonding appropriately  Outcome: Progressing  Note: vss     Problem: Postpartum  Goal: Establishment of infant feeding pattern  Description:  Postpartum OB-Pregnancy care plan goal which identifies if the mother is establishing a feeding pattern with their   Outcome: Progressing  Flowsheets (Taken 2024)  Establishment of Infant Feeding Pattern: Assess breast/bottle feeding     Problem: Postpartum  Goal: Incisions, wounds, or drain sites healing without S/S of infection  Outcome: Progressing  Flowsheets (Taken 2024)  Incisions, Wounds, or Drain Sites Healing Without Sign and Symptoms of Infection: ADMISSION and DAILY: Assess and document risk factors for pressure ulcer development     Problem: Pain  Goal: Verbalizes/displays adequate comfort level or baseline comfort level  Outcome: Progressing  Flowsheets (Taken 2024)  Verbalizes/displays adequate comfort level or baseline comfort level: Encourage patient to monitor pain and request assistance     Problem: Infection - Adult  Goal: Absence of infection at discharge  Outcome: Progressing  Flowsheets (Taken 2024)  Absence of infection at discharge: Monitor lab/diagnostic results     Problem: Safety - Adult  Goal: Free from fall injury  Outcome: Progressing  Flowsheets (Taken 2024)  Free From Fall Injury: Instruct

## 2024-08-02 NOTE — PLAN OF CARE
Problem: Respiratory - Adult  Goal: Clear lung sounds  2024 by Marylou Solares RN  Outcome: Progressing  Note: Clear to auscultation. Has nebulizer ordered PRN     Problem: Postpartum  Goal: Experiences normal postpartum course  Description:  Postpartum OB-Pregnancy care plan goal which identifies if the mother is experiencing a normal postpartum course  2024 1357 by Marylou Solares RN  Outcome: Progressing  Flowsheets (Taken 2024 0807)  Experiences Normal Postpartum Course:   Monitor maternal vital signs   Assess uterine involution     Problem: Postpartum  Goal: Appropriate maternal -  bonding  Description:  Postpartum OB-Pregnancy care plan goal which identifies if the mother and  are bonding appropriately  2024 by Marylou Solares RN  Outcome: Progressing  Note: Bonding with baby, participating in infant care.      Problem: Postpartum  Goal: Establishment of infant feeding pattern  Description:  Postpartum OB-Pregnancy care plan goal which identifies if the mother is establishing a feeding pattern with their   2024 by Marylou Solares RN  Outcome: Progressing  Flowsheets (Taken 2024 0807)  Establishment of Infant Feeding Pattern:   Assess breast/bottle feeding   Refer to lactation as needed     Problem: Postpartum  Goal: Incisions, wounds, or drain sites healing without S/S of infection  2024 by Marylou Solares RN  Outcome: Progressing  Flowsheets (Taken 2024 0807)  Incisions, Wounds, or Drain Sites Healing Without Sign and Symptoms of Infection: TWICE DAILY: Assess and document dressing/incision, wound bed, drain sites and surrounding tissue     Problem: Pain  Goal: Verbalizes/displays adequate comfort level or baseline comfort level  2024 by Marylou Solares RN  Outcome: Progressing  Flowsheets (Taken 2024 0807)  Verbalizes/displays adequate comfort level or baseline comfort level:   Encourage patient to monitor pain and request  appropriate pain scale   Administer analgesics based on type and severity of pain and evaluate response   Implement non-pharmacological measures as appropriate and evaluate response     Problem: Infection - Adult  Goal: Absence of infection at discharge  Recent Flowsheet Documentation  Taken 8/2/2024 0807 by Marylou Solares RN  Absence of infection at discharge:   Assess and monitor for signs and symptoms of infection   Monitor lab/diagnostic results   Administer medications as ordered   Instruct and encourage patient and family to use good hand hygiene technique     Problem: Safety - Adult  Goal: Free from fall injury  Recent Flowsheet Documentation  Taken 8/2/2024 0807 by Marylou Solares RN  Free From Fall Injury: Instruct family/caregiver on patient safety     Problem: Discharge Planning  Goal: Discharge to home or other facility with appropriate resources  Recent Flowsheet Documentation  Taken 8/2/2024 0807 by Marylou Solares RN  Discharge to home or other facility with appropriate resources: Identify barriers to discharge with patient and caregiver     Care plan reviewed with patient and she contributes to goal setting and voices understanding of plan of care.

## 2024-08-02 NOTE — PROGRESS NOTES
08/02/24 1117   RT Protocol   History Pulmonary Disease 1   Respiratory pattern 0   Breath sounds 2   Cough 0   Indications for Bronchodilator Therapy On home bronchodilators   Bronchodilator Assessment Score 3

## 2024-08-02 NOTE — PROGRESS NOTES
In to see pt. Dressing removed for less drainage than previously. New silver occlusive dressing placed.  Will monitor this afternoon to decide if discharge is wise. Pt desires.  Angeles Sanchez MD 8/2/2024 1:01 PM

## 2024-08-02 NOTE — DISCHARGE SUMMARY
Obstetrical Discharge Form      Pt Name: Rahel Blount  MRN: [id number] Acct #: [id number]  YOB: 1990      Admitting Diagnosis  IUP, 39 weeks pregnancy  OB History          5    Para   5    Term   5            AB        Living   4         SAB        IAB        Ectopic        Molar        Multiple   0    Live Births   4                Reasons for Admission on 2024  6:14 AM  Previous  section [Z98.891]  Delivery of pregnancy by  section [O82]  C section.      Intrapartum Procedures                          Postpartum/Operative Complications  None    Data  Information for the patient's :  Neeta Blount [[id number]]   female   Birth Weight: 3.79 kg (8 lb 5.7 oz)     Discharge Diagnosis    Same, delivered  Status post .    Discharge Information  Current Discharge Medication List        CONTINUE these medications which have NOT CHANGED    Details   !! albuterol (PROVENTIL) (2.5 MG/3ML) 0.083% nebulizer solution Take 3 mLs by nebulization 4 times daily as needed for Wheezing  Qty: 120 each, Refills: 3    Associated Diagnoses: Mild intermittent asthma without complication      ondansetron (ZOFRAN-ODT) 4 MG disintegrating tablet Take 1 tablet by mouth every 8 hours as needed for Nausea or Vomiting  Qty: 20 tablet, Refills: 0      Prenatal MV-Min-Fe Fum-FA-DHA (PRENATAL 1 PO) Take by mouth      !! albuterol (PROVENTIL) (2.5 MG/3ML) 0.083% nebulizer solution Take 3 mLs by nebulization every 6 hours as needed for Wheezing  Qty: 60 each, Refills: 0      acetaminophen (TYLENOL) 500 MG tablet Take 2 tablets by mouth every 6 hours as needed for Pain       !! - Potential duplicate medications found. Please discuss with provider.        STOP taking these medications       predniSONE (DELTASONE) 20 MG tablet Comments:   Reason for Stopping:         ATROVENT HFA 17 MCG/ACT inhaler Comments:   Reason for Stopping:               See progress note from

## 2024-08-02 NOTE — FLOWSHEET NOTE
Post birth warning signs education paper given and reviewed, teaching complete. [place] postpartum depression screening discussed with patient, instructed to contact her healthcare provider if her score is > 10. Patient voiced understanding.  Mother's blood type is O+.  Baby's blood type is A+.  Mother did not receive Rhogam.

## 2024-08-02 NOTE — PLAN OF CARE
Problem: Respiratory - Adult  Goal: Clear lung sounds  8/2/2024 1116 by Renata Dowell, RCP  Outcome: Progressing

## 2024-08-02 NOTE — PROGRESS NOTES
Progress note    Subjective:     Postpartum Day 2:  Delivery    Pain is well controlled with current medications.  The patient is ambulating well. The patient is tolerating a normal diet. Flatus has been passed. Pressure dressing has no drainage noted. Desires breastfeeding but currently bottle feeding. Reports mood is good.    Objective:     Vitals:    24 0010   BP: (!) 145/65   Pulse: 63   Resp: 16   Temp: 98.5 °F (36.9 °C)   SpO2:          General:    alert, appears stated age, and cooperative   Uterine Fundus:   firm   Incision:  Covered, no significant drainage        CBC   Lab Results   Component Value Date    WBC 14.6 (H) 2024    HGB 9.0 (L) 2024    HCT 28.0 (L) 2024     2024        Assessment:     Status post  section. Doing well postoperatively. Had significant serous drainage initially but none since new occlusive dressing placed yesterday.     Plan:     Trial dressing change and if no significant drainage noted will consider discharge later today    Stephanie Ruiz DO 2024 7:42 AM

## 2024-08-02 NOTE — FLOWSHEET NOTE
RN into assess patient's dressing. 2 small circles of red/brown drainage noted on silver dressing. Quarter size amount and one larger 2in [place] noted. Areas outlined with pen. Notified Dr. Sanchez of findings and patient still desires discharge. Okay to discharge per Dr. Sanchez.

## 2024-09-26 NOTE — ED NOTES
Ambulated pt with pulse ox, 99% the whole time, pt tolerated well.       Filomena Kuhn  10/20/21 1394
Patient to ED for dyspnea with exertion. Patient states she tested positive on the 11th of October.  Patient has hx of asthma and states she gets sob with very little activity      Vel Alexander RN  10/20/21 1860
Unknown if ever smoked

## 2024-12-19 ENCOUNTER — HOSPITAL ENCOUNTER (EMERGENCY)
Age: 34
Discharge: HOME | End: 2024-12-19
Payer: COMMERCIAL

## 2024-12-19 VITALS
SYSTOLIC BLOOD PRESSURE: 129 MMHG | OXYGEN SATURATION: 99 % | TEMPERATURE: 97.6 F | HEART RATE: 69 BPM | BODY MASS INDEX: 39.87 KG/M2 | WEIGHT: 270 LBS | RESPIRATION RATE: 16 BRPM | DIASTOLIC BLOOD PRESSURE: 79 MMHG

## 2024-12-19 DIAGNOSIS — J00 ACUTE NASOPHARYNGITIS: Primary | ICD-10-CM

## 2024-12-19 LAB — S PYO AG THROAT QL: NEGATIVE

## 2024-12-19 PROCEDURE — 99213 OFFICE O/P EST LOW 20 MIN: CPT | Performed by: NURSE PRACTITIONER

## 2024-12-19 PROCEDURE — 99213 OFFICE O/P EST LOW 20 MIN: CPT

## 2024-12-19 PROCEDURE — 87651 STREP A DNA AMP PROBE: CPT

## 2024-12-19 RX ORDER — PSEUDOEPHEDRINE HCL 120 MG/1
120 TABLET, FILM COATED, EXTENDED RELEASE ORAL EVERY 12 HOURS
Qty: 14 TABLET | Refills: 0 | Status: SHIPPED | OUTPATIENT
Start: 2024-12-19 | End: 2024-12-26

## 2024-12-19 RX ORDER — AZELASTINE 1 MG/ML
1 SPRAY, METERED NASAL 2 TIMES DAILY
Qty: 30 ML | Refills: 0 | Status: SHIPPED | OUTPATIENT
Start: 2024-12-19

## 2024-12-19 RX ORDER — ONDANSETRON 4 MG/1
4 TABLET, FILM COATED ORAL EVERY 8 HOURS PRN
Qty: 9 TABLET | Refills: 0 | Status: SHIPPED | OUTPATIENT
Start: 2024-12-19 | End: 2024-12-22

## 2024-12-19 ASSESSMENT — ENCOUNTER SYMPTOMS
TROUBLE SWALLOWING: 0
SINUS CONGESTION: 1
ABDOMINAL PAIN: 0
APNEA: 0
SORE THROAT: 1
WHEEZING: 0
EYE DISCHARGE: 0
SHORTNESS OF BREATH: 0
CHOKING: 0
STRIDOR: 0
CHEST TIGHTNESS: 0
COUGH: 1
VOICE CHANGE: 0
RHINORRHEA: 1

## 2024-12-19 ASSESSMENT — PAIN SCALES - GENERAL: PAINLEVEL_OUTOF10: 3

## 2024-12-19 NOTE — DISCHARGE INSTRUCTIONS
Suggestions for symptom management that are available over the counter.   If you have questions regarding allergies or contraindications to use, please speak to the pharmacy staff or your family provider.    For fevers or pain: acetaminophen (Tylenol), ibuprofen (Motrin)  For dry cough: medications containing dextromethorphan, such as Delsym, Robitussin DM or Mucinex DM and medicated throat lozenges  For congestion or sinus pressure: decongestant nasal sprays, such as Afrin (for up to 3 days), medications containing guaifenesin to help break up mucus, such as Mucinex or Robitussin, nasal steroid sprays, such as Flonase, Sensimist, Rhinocort or Nasonex and saline nasal sprays, neti pot or sinus rinse bottle  For runny nose, sneezing or watery/itchy eyes: less sedating antihistamines, such as loratidine (Claritin), fexofenadine (Allegra) or Cetirizine (Zyrtec) and antihistamine eye drops, such as ketotifen (Zaditor, Alaway) or olopatadine (Pataday)  For sore throat:  Chloraseptic throat spray or sore throat lozenges or  Mucinex Instasoothe Sore Throat & Pain Cherry Spray  If you have high blood pressure, a brand like Coricidin HBP may be an option.you should avoid medications containing pseudoephedrine or phenylephrine, such as Sudafed,  running a humidifier in your bedroom may be helpful for many of your symptoms.  If your cough is keeping you awake at night, you can try raising your head with an extra pillow.  If the skin around your nose and lips becomes sore, you can put some petroleum jelly on the area.      Suggestions for over-the-counter supplements to help your immune system, if you have questions regarding allergies or contraindications to use, please speak to the pharmacy staff or your family provider.    Multi-vitamin/multi-mineral daily   Vitamin D3 3000 IU daily  Zinc 30 mg daily  Vitamin C 1000 mg twice daily  B-100 complex as daily as directed on bottle  Probiotic/Prebiotic de la torre  Gargle with

## 2024-12-19 NOTE — ED PROVIDER NOTES
[facility] URGENT CARE  Urgent Care Encounter      CHIEF COMPLAINT       Chief Complaint   Patient presents with    Pharyngitis       Nurses Notes reviewed and I agree except as noted in the HPI.  HISTORY OFPRESENT ILLNESS   Rahel Blount is a 34 y.o.  The history is provided by the patient. No  was used.   Pharyngitis  Location:  Generalized  Quality:  Sore  Severity:  Mild  Onset quality:  Sudden  Duration:  2 days  Timing:  Constant  Progression:  Waxing and waning  Chronicity:  New  Relieved by:  Nothing  Worsened by:  Swallowing  Ineffective treatments:  OTC medications  Associated symptoms: cough, headaches, postnasal drip, rhinorrhea and sinus congestion    Associated symptoms: no abdominal pain, no adenopathy, no chest pain, no chills, no drooling, no ear discharge, no ear pain, no epistaxis, no eye discharge, no fever, no neck stiffness, no night sweats, no plugged ear sensation, no rash, no shortness of breath, no stridor, no trouble swallowing and no voice change    Risk factors: no exposure to strep, no exposure to mono, no sick contacts, no recent dental procedure, no recent endoscopy and no recent ENT procedure        REVIEW OF SYSTEMS     Review of Systems   Constitutional:  Positive for fatigue. Negative for activity change, appetite change, chills, diaphoresis, fever and night sweats.   HENT:  Positive for congestion, postnasal drip, rhinorrhea and sore throat. Negative for drooling, ear discharge, ear pain, nosebleeds, trouble swallowing and voice change.    Eyes:  Negative for discharge.   Respiratory:  Positive for cough. Negative for apnea, choking, chest tightness, shortness of breath, wheezing and stridor.    Cardiovascular:  Negative for chest pain, palpitations and leg swelling.   Gastrointestinal:  Negative for abdominal pain.   Musculoskeletal:  Negative for neck stiffness.   Skin:  Negative for rash.   Neurological:  Positive for headaches. Negative  Pulse: 69, Respirations: 16, SpO2: 99 %  Physical Exam  Vitals and nursing note reviewed.   Constitutional:       General: She is not in acute distress.     Appearance: She is well-developed. She is obese. She is not ill-appearing, toxic-appearing or diaphoretic.   HENT:      Head: Normocephalic and atraumatic.      Right Ear: Ear canal normal. No drainage, swelling or tenderness. No middle ear effusion. Tympanic membrane is not erythematous.      Left Ear: Ear canal normal. No drainage, swelling or tenderness.  No middle ear effusion. Tympanic membrane is not erythematous.      Nose: Congestion and rhinorrhea present.      Mouth/Throat:      Mouth: No oral lesions.      Pharynx: Uvula midline. Posterior oropharyngeal erythema present. No pharyngeal swelling, oropharyngeal exudate or uvula swelling.   Eyes:      Conjunctiva/sclera: Conjunctivae normal.   Pulmonary:      Effort: Pulmonary effort is normal. No respiratory distress.      Breath sounds: Normal breath sounds. No stridor. No wheezing, rhonchi or rales.   Chest:      Chest wall: No tenderness.   Musculoskeletal:      Cervical back: Normal range of motion.   Skin:     General: Skin is warm and dry.   Neurological:      General: No focal deficit present.      Mental Status: She is alert and oriented to person, place, and time.   Psychiatric:         Mood and Affect: Mood normal.         Behavior: Behavior normal.         DIAGNOSTIC RESULTS   Labs:  Results for orders placed or performed during the hospital encounter of 12/19/24   Strep Screen Group A Throat   Result Value Ref Range    Rapid Strep A Screen NEGATIVE        IMAGING:  No orders to display     URGENT CARE COURSE:     Vitals:    12/19/24 1430   BP: 129/79   Pulse: 69   Resp: 16   Temp: 97.6 °F (36.4 °C)   TempSrc: Temporal   SpO2: 99%   Weight: 122.5 kg (270 lb)       Medications - No data to display  PROCEDURES:  None  FINAL IMPRESSION      1. Acute nasopharyngitis        DISPOSITION/PLAN

## 2025-03-09 ENCOUNTER — APPOINTMENT (OUTPATIENT)
Dept: GENERAL RADIOLOGY | Age: 35
End: 2025-03-09
Payer: COMMERCIAL

## 2025-03-09 ENCOUNTER — HOSPITAL ENCOUNTER (EMERGENCY)
Age: 35
Discharge: HOME | End: 2025-03-09
Attending: STUDENT IN AN ORGANIZED HEALTH CARE EDUCATION/TRAINING PROGRAM
Payer: COMMERCIAL

## 2025-03-09 VITALS
HEART RATE: 79 BPM | RESPIRATION RATE: 20 BRPM | HEIGHT: 69 IN | TEMPERATURE: 98.7 F | SYSTOLIC BLOOD PRESSURE: 155 MMHG | OXYGEN SATURATION: 97 % | WEIGHT: 260 LBS | BODY MASS INDEX: 38.51 KG/M2 | DIASTOLIC BLOOD PRESSURE: 72 MMHG

## 2025-03-09 DIAGNOSIS — J45.901 MODERATE ASTHMA WITH ACUTE EXACERBATION, UNSPECIFIED WHETHER PERSISTENT: Primary | ICD-10-CM

## 2025-03-09 LAB
ANION GAP SERPL CALC-SCNC: 10 MEQ/L (ref 8–16)
BASOPHILS ABSOLUTE: 0 THOU/MM3 (ref 0–0.1)
BASOPHILS NFR BLD AUTO: 0.5 %
BUN SERPL-MCNC: 9 MG/DL (ref 8–23)
CALCIUM SERPL-MCNC: 9.2 MG/DL (ref 8.6–10)
CHLORIDE SERPL-SCNC: 107 MEQ/L (ref 98–111)
CO2 SERPL-SCNC: 25 MEQ/L (ref 22–29)
CREAT SERPL-MCNC: 0.8 MG/DL (ref 0.5–0.9)
DEPRECATED RDW RBC AUTO: 42.4 FL (ref 35–45)
EKG ATRIAL RATE: 73 BPM
EKG P AXIS: 66 DEGREES
EKG P-R INTERVAL: 132 MS
EKG Q-T INTERVAL: 366 MS
EKG QRS DURATION: 74 MS
EKG QTC CALCULATION (BAZETT): 403 MS
EKG R AXIS: 21 DEGREES
EKG T AXIS: 32 DEGREES
EKG VENTRICULAR RATE: 73 BPM
EOSINOPHIL NFR BLD AUTO: 5.1 %
EOSINOPHILS ABSOLUTE: 0.4 THOU/MM3 (ref 0–0.4)
ERYTHROCYTE [DISTWIDTH] IN BLOOD BY AUTOMATED COUNT: 14.7 % (ref 11.5–14.5)
GFR SERPL CREATININE-BSD FRML MDRD: > 90 ML/MIN/1.73M2
GLUCOSE SERPL-MCNC: 104 MG/DL (ref 74–109)
HCT VFR BLD AUTO: 40 % (ref 37–47)
HGB BLD-MCNC: 12.8 GM/DL (ref 12–16)
IMM GRANULOCYTES # BLD AUTO: 0.02 THOU/MM3 (ref 0–0.07)
IMM GRANULOCYTES NFR BLD AUTO: 0.3 %
LYMPHOCYTES ABSOLUTE: 2 THOU/MM3 (ref 1–4.8)
LYMPHOCYTES NFR BLD AUTO: 26.9 %
MCH RBC QN AUTO: 25.4 PG (ref 26–33)
MCHC RBC AUTO-ENTMCNC: 32 GM/DL (ref 32.2–35.5)
MCV RBC AUTO: 79.5 FL (ref 81–99)
MONOCYTES ABSOLUTE: 0.4 THOU/MM3 (ref 0.4–1.3)
MONOCYTES NFR BLD AUTO: 5.3 %
NEUTROPHILS ABSOLUTE: 4.5 THOU/MM3 (ref 1.8–7.7)
NEUTROPHILS NFR BLD AUTO: 61.9 %
NRBC BLD AUTO-RTO: 0 /100 WBC
OSMOLALITY SERPL CALC.SUM OF ELEC: 282.1 MOSMOL/KG (ref 275–300)
PLATELET # BLD AUTO: 309 THOU/MM3 (ref 130–400)
PMV BLD AUTO: 11 FL (ref 9.4–12.4)
POTASSIUM SERPL-SCNC: 4 MEQ/L (ref 3.5–5.2)
RBC # BLD AUTO: 5.03 MILL/MM3 (ref 4.2–5.4)
SODIUM SERPL-SCNC: 142 MEQ/L (ref 135–145)
WBC # BLD AUTO: 7.3 THOU/MM3 (ref 4.8–10.8)

## 2025-03-09 PROCEDURE — 99285 EMERGENCY DEPT VISIT HI MDM: CPT

## 2025-03-09 PROCEDURE — 96375 TX/PRO/DX INJ NEW DRUG ADDON: CPT

## 2025-03-09 PROCEDURE — 96374 THER/PROPH/DIAG INJ IV PUSH: CPT

## 2025-03-09 PROCEDURE — 80048 BASIC METABOLIC PNL TOTAL CA: CPT

## 2025-03-09 PROCEDURE — 6370000000 HC RX 637 (ALT 250 FOR IP): Performed by: STUDENT IN AN ORGANIZED HEALTH CARE EDUCATION/TRAINING PROGRAM

## 2025-03-09 PROCEDURE — 85025 COMPLETE CBC W/AUTO DIFF WBC: CPT

## 2025-03-09 PROCEDURE — 36415 COLL VENOUS BLD VENIPUNCTURE: CPT

## 2025-03-09 PROCEDURE — 93005 ELECTROCARDIOGRAM TRACING: CPT

## 2025-03-09 PROCEDURE — 94640 AIRWAY INHALATION TREATMENT: CPT

## 2025-03-09 PROCEDURE — 6370000000 HC RX 637 (ALT 250 FOR IP)

## 2025-03-09 PROCEDURE — 6360000002 HC RX W HCPCS

## 2025-03-09 PROCEDURE — 94761 N-INVAS EAR/PLS OXIMETRY MLT: CPT

## 2025-03-09 PROCEDURE — 2500000003 HC RX 250 WO HCPCS: Performed by: STUDENT IN AN ORGANIZED HEALTH CARE EDUCATION/TRAINING PROGRAM

## 2025-03-09 PROCEDURE — 71046 X-RAY EXAM CHEST 2 VIEWS: CPT

## 2025-03-09 PROCEDURE — 93010 ELECTROCARDIOGRAM REPORT: CPT | Performed by: INTERNAL MEDICINE

## 2025-03-09 PROCEDURE — 6360000002 HC RX W HCPCS: Performed by: STUDENT IN AN ORGANIZED HEALTH CARE EDUCATION/TRAINING PROGRAM

## 2025-03-09 RX ORDER — PREDNISONE 20 MG/1
40 TABLET ORAL DAILY
Status: DISCONTINUED | OUTPATIENT
Start: 2025-03-09 | End: 2025-03-09 | Stop reason: HOSPADM

## 2025-03-09 RX ORDER — IPRATROPIUM BROMIDE AND ALBUTEROL SULFATE 2.5; .5 MG/3ML; MG/3ML
1 SOLUTION RESPIRATORY (INHALATION) EVERY 4 HOURS PRN
Status: DISCONTINUED | OUTPATIENT
Start: 2025-03-09 | End: 2025-03-09

## 2025-03-09 RX ORDER — IPRATROPIUM BROMIDE AND ALBUTEROL SULFATE 2.5; .5 MG/3ML; MG/3ML
1 SOLUTION RESPIRATORY (INHALATION) ONCE
Status: COMPLETED | OUTPATIENT
Start: 2025-03-09 | End: 2025-03-09

## 2025-03-09 RX ORDER — PREDNISONE 20 MG/1
40 TABLET ORAL DAILY
Qty: 8 TABLET | Refills: 0 | Status: SHIPPED | OUTPATIENT
Start: 2025-03-09 | End: 2025-03-13

## 2025-03-09 RX ORDER — ALBUTEROL SULFATE 0.83 MG/ML
2.5 SOLUTION RESPIRATORY (INHALATION) EVERY 6 HOURS PRN
Qty: 120 EACH | Refills: 11 | Status: SHIPPED | OUTPATIENT
Start: 2025-03-09 | End: 2026-03-09

## 2025-03-09 RX ORDER — DIPHENHYDRAMINE HYDROCHLORIDE 50 MG/ML
25 INJECTION INTRAMUSCULAR; INTRAVENOUS ONCE
Status: COMPLETED | OUTPATIENT
Start: 2025-03-09 | End: 2025-03-09

## 2025-03-09 RX ORDER — HYDROCORTISONE SODIUM SUCCINATE 100 MG/2ML
100 INJECTION INTRAMUSCULAR; INTRAVENOUS ONCE
Status: DISCONTINUED | OUTPATIENT
Start: 2025-03-09 | End: 2025-03-09

## 2025-03-09 RX ADMIN — IPRATROPIUM BROMIDE AND ALBUTEROL SULFATE 1 DOSE: .5; 3 SOLUTION RESPIRATORY (INHALATION) at 16:45

## 2025-03-09 RX ADMIN — DIPHENHYDRAMINE HYDROCHLORIDE 25 MG: 50 INJECTION INTRAMUSCULAR; INTRAVENOUS at 14:35

## 2025-03-09 RX ADMIN — PREDNISONE 40 MG: 20 TABLET ORAL at 17:55

## 2025-03-09 RX ADMIN — WATER 125 MG: 1 INJECTION INTRAMUSCULAR; INTRAVENOUS; SUBCUTANEOUS at 14:37

## 2025-03-09 ASSESSMENT — PAIN DESCRIPTION - LOCATION
LOCATION: CHEST
LOCATION: CHEST

## 2025-03-09 ASSESSMENT — PAIN - FUNCTIONAL ASSESSMENT
PAIN_FUNCTIONAL_ASSESSMENT: NONE - DENIES PAIN
PAIN_FUNCTIONAL_ASSESSMENT: 0-10
PAIN_FUNCTIONAL_ASSESSMENT: 0-10
PAIN_FUNCTIONAL_ASSESSMENT: NONE - DENIES PAIN

## 2025-03-09 ASSESSMENT — PAIN DESCRIPTION - PAIN TYPE: TYPE: ACUTE PAIN

## 2025-03-09 ASSESSMENT — PAIN SCALES - GENERAL
PAINLEVEL_OUTOF10: 5
PAINLEVEL_OUTOF10: 4

## 2025-03-09 NOTE — ED NOTES
Pt reassessed at this time. Pt resting on cot with no complaints. Vitals stable with telemetry in place. Call light in reach.

## 2025-03-09 NOTE — ED PROVIDER NOTES
(DELTASONE) tablet 40 mg, 40 mg, Oral, Daily, Veronica Coley MD, 40 mg at 03/09/25 1138    Current Outpatient Medications:     albuterol (PROVENTIL) (2.5 MG/3ML) 0.083% nebulizer solution, Take 3 mLs by nebulization every 6 hours as needed for Wheezing or Shortness of Breath, Disp: 120 each, Rfl: 11    predniSONE (DELTASONE) 20 MG tablet, Take 2 tablets by mouth daily for 4 days, Disp: 8 tablet, Rfl: 0    azelastine (ASTELIN) 0.1 % nasal spray, 1 spray by Nasal route 2 times daily Use in each nostril as directed, Disp: 30 mL, Rfl: 0    ibuprofen (ADVIL;MOTRIN) 800 MG tablet, Take 1 tablet by mouth 3 times daily for 240 doses, Disp: 120 tablet, Rfl: 1    albuterol (PROVENTIL) (2.5 MG/3ML) 0.083% nebulizer solution, Take 3 mLs by nebulization 4 times daily as needed for Wheezing, Disp: 120 each, Rfl: 3    albuterol (PROVENTIL) (2.5 MG/3ML) 0.083% nebulizer solution, Take 3 mLs by nebulization every 6 hours as needed for Wheezing, Disp: 60 each, Rfl: 0    acetaminophen (TYLENOL) 500 MG tablet, Take 2 tablets by mouth every 6 hours as needed for Pain, Disp: , Rfl:       SOCIAL HISTORY     Social History     Social History Narrative    Not on file     Social History     Tobacco Use    Smoking status: Former     Current packs/day: 0.00     Types: Cigarettes     Quit date: 2/4/2010     Years since quitting: 15.1     Passive exposure: Never    Smokeless tobacco: Never   Vaping Use    Vaping status: Never Used   Substance Use Topics    Alcohol use: No    Drug use: No         ALLERGIES     Allergies   Allergen Reactions    Rocephin [Ceftriaxone] Anaphylaxis    Toradol [Ketorolac Tromethamine] Hives         FAMILY HISTORY     Family History   Problem Relation Age of Onset    Diabetes Mother     High Blood Pressure Mother     Other Father         Sickle Cell Trait    Asthma Sister     Asthma Brother          PREVIOUS RECORDS   Previous records reviewed: .        PHYSICAL EXAM     ED Triage Vitals [03/09/25 1328]   BP Systolic

## 2025-03-09 NOTE — ED TRIAGE NOTES
Pt to the ED through intake with c/o allergic reaction and chest tightness. Pt reports her daughter made shellfish last evening in her home and she is allergic. Pt reports she took Benadryl around 1 am that helped but has not taken anything since then. Pt reports her neck/throat and ears felt \"itchy\" also. Pts respirations easy and unlabored. Vitals stable with telemetry in place. EKG obtained.

## 2025-03-25 ENCOUNTER — APPOINTMENT (OUTPATIENT)
Dept: GENERAL RADIOLOGY | Age: 35
End: 2025-03-25
Payer: COMMERCIAL

## 2025-03-25 ENCOUNTER — HOSPITAL ENCOUNTER (EMERGENCY)
Age: 35
Discharge: HOME | End: 2025-03-25
Payer: COMMERCIAL

## 2025-03-25 VITALS
SYSTOLIC BLOOD PRESSURE: 140 MMHG | WEIGHT: 260 LBS | HEART RATE: 72 BPM | TEMPERATURE: 98.2 F | RESPIRATION RATE: 18 BRPM | OXYGEN SATURATION: 100 % | DIASTOLIC BLOOD PRESSURE: 74 MMHG | BODY MASS INDEX: 38.4 KG/M2

## 2025-03-25 DIAGNOSIS — S39.012A STRAIN OF LUMBAR REGION, INITIAL ENCOUNTER: Primary | ICD-10-CM

## 2025-03-25 PROCEDURE — 72100 X-RAY EXAM L-S SPINE 2/3 VWS: CPT

## 2025-03-25 PROCEDURE — 6370000000 HC RX 637 (ALT 250 FOR IP)

## 2025-03-25 PROCEDURE — 99283 EMERGENCY DEPT VISIT LOW MDM: CPT

## 2025-03-25 RX ORDER — ACETAMINOPHEN 500 MG
500 TABLET ORAL ONCE
Status: COMPLETED | OUTPATIENT
Start: 2025-03-25 | End: 2025-03-25

## 2025-03-25 RX ORDER — LIDOCAINE 4 G/G
1 PATCH TOPICAL ONCE
Status: DISCONTINUED | OUTPATIENT
Start: 2025-03-25 | End: 2025-03-25 | Stop reason: HOSPADM

## 2025-03-25 RX ORDER — CYCLOBENZAPRINE HCL 10 MG
10 TABLET ORAL 3 TIMES DAILY PRN
Qty: 21 TABLET | Refills: 0 | Status: SHIPPED | OUTPATIENT
Start: 2025-03-25 | End: 2025-04-04

## 2025-03-25 RX ORDER — ORPHENADRINE CITRATE 100 MG/1
100 TABLET ORAL ONCE
Status: COMPLETED | OUTPATIENT
Start: 2025-03-25 | End: 2025-03-25

## 2025-03-25 RX ORDER — LIDOCAINE 4 G/G
1 PATCH TOPICAL DAILY
Qty: 30 PATCH | Refills: 0 | Status: SHIPPED | OUTPATIENT
Start: 2025-03-25 | End: 2025-04-24

## 2025-03-25 RX ORDER — ACETAMINOPHEN 500 MG
500 TABLET ORAL 4 TIMES DAILY PRN
Qty: 360 TABLET | Refills: 1 | Status: SHIPPED | OUTPATIENT
Start: 2025-03-25

## 2025-03-25 RX ORDER — ORPHENADRINE CITRATE 100 MG/1
100 TABLET ORAL 2 TIMES DAILY
Status: DISCONTINUED | OUTPATIENT
Start: 2025-03-25 | End: 2025-03-25

## 2025-03-25 RX ADMIN — ORPHENADRINE CITRATE 100 MG: 100 TABLET, EXTENDED RELEASE ORAL at 10:07

## 2025-03-25 RX ADMIN — ACETAMINOPHEN 500 MG: 500 TABLET ORAL at 09:40

## 2025-03-25 ASSESSMENT — PAIN DESCRIPTION - PAIN TYPE: TYPE: ACUTE PAIN

## 2025-03-25 ASSESSMENT — PAIN DESCRIPTION - DESCRIPTORS: DESCRIPTORS: SHARP;BURNING

## 2025-03-25 ASSESSMENT — PAIN - FUNCTIONAL ASSESSMENT: PAIN_FUNCTIONAL_ASSESSMENT: 0-10

## 2025-03-25 ASSESSMENT — PAIN DESCRIPTION - LOCATION: LOCATION: BACK

## 2025-03-25 ASSESSMENT — PAIN DESCRIPTION - FREQUENCY: FREQUENCY: CONTINUOUS

## 2025-03-25 ASSESSMENT — PAIN SCALES - GENERAL: PAINLEVEL_OUTOF10: 8

## 2025-03-25 NOTE — DISCHARGE INSTRUCTIONS
Follow-up with the orthopedic [facility].  They have a walk-in clinic available for ER visit follow-ups until closing on the weekdays.  Take care he also follow-up with your PCP.  In the meantime use the medications prescribed for pain.  Flexeril does make you drowsy so ideally take it at night.  Do not drive or operate heavy machinery for 6 hours after taking the medication.    Use an ice pack or bag filled with ice and apply to the injured area 3 - 4 times a day for 15 - 20 minutes each time.  If the injury is older than 3 days, then use a heating pad to help relax the muscles in your back.    Use ibuprofen or Tylenol (unless prescribed medications that have Tylenol in it) for pain.  You can take over the counter Ibuprofen (advil) tablets (4 tablets every 8 hours or 3 tablets every 6 hours or 2 tablets every 4 hours)    Jose Alejandro' Flexion Exercises     1. Pelvic tilt. Lie on your back with knees bent, feet flat on floor. Flatten the small of your back against the floor, without pushing down with the legs. Hold for 5 to 10 seconds.     2. Single Knee to chest. Lie on your back with knees bent and feet flat on the floor. Slowly pull your right knee toward your shoulder and hold 5 to 10 seconds. Lower the knee and repeat with the other knee.     3. Double knee to chest. Begin as in the previous exercise. After pulling right knee to chest, pull left knee to chest and hold both knees for 5 to 10 seconds. Slowly lower one leg at a time.     4. Partial sit-up. Do the pelvic tilt (exercise 1) and, while holding this position, slowly curl your head and shoulders off the floor. Hold briefly. Return slowly to the starting position.     5. Hamstring stretch. Start in long sitting with toes directed toward the ceiling and knees fully extended. Slowly lower the trunk forward over the legs, keeping knees extended, arms outstretched over the legs, and eyes focus ahead.     6. Hip Flexor stretch. Place one foot in front of

## 2025-03-25 NOTE — ED NOTES
Patient presents to the ED with c/o of back pain that started approximately 3/23/25. Pt states her pain is more near her tailbone. Pt states she went to the chiropractor yesterday, but has had no relief. Pt states she did not take any pain medications this morning. Call light within reach. Denies needs.

## 2025-05-17 ENCOUNTER — HOSPITAL ENCOUNTER (EMERGENCY)
Age: 35
Discharge: HOME | End: 2025-05-17
Payer: COMMERCIAL

## 2025-05-17 VITALS
RESPIRATION RATE: 20 BRPM | HEART RATE: 104 BPM | OXYGEN SATURATION: 97 % | TEMPERATURE: 97.9 F | SYSTOLIC BLOOD PRESSURE: 128 MMHG | DIASTOLIC BLOOD PRESSURE: 75 MMHG

## 2025-05-17 DIAGNOSIS — J20.9 ACUTE BRONCHITIS, UNSPECIFIED ORGANISM: Primary | ICD-10-CM

## 2025-05-17 PROCEDURE — 99213 OFFICE O/P EST LOW 20 MIN: CPT

## 2025-05-17 PROCEDURE — 99214 OFFICE O/P EST MOD 30 MIN: CPT

## 2025-05-17 RX ORDER — BROMPHENIRAMINE MALEATE, PSEUDOEPHEDRINE HYDROCHLORIDE, AND DEXTROMETHORPHAN HYDROBROMIDE 2; 30; 10 MG/5ML; MG/5ML; MG/5ML
5 SYRUP ORAL 4 TIMES DAILY PRN
Qty: 118 ML | Refills: 0 | Status: SHIPPED | OUTPATIENT
Start: 2025-05-17 | End: 2025-05-31

## 2025-05-17 RX ORDER — PREDNISONE 20 MG/1
TABLET ORAL
Qty: 15 TABLET | Refills: 0 | Status: SHIPPED | OUTPATIENT
Start: 2025-05-17 | End: 2025-05-27

## 2025-05-17 RX ORDER — AZITHROMYCIN 250 MG/1
TABLET, FILM COATED ORAL
COMMUNITY
Start: 2025-05-13

## 2025-05-17 RX ORDER — BENZONATATE 200 MG/1
200 CAPSULE ORAL 3 TIMES DAILY PRN
Qty: 30 CAPSULE | Refills: 0 | Status: SHIPPED | OUTPATIENT
Start: 2025-05-17 | End: 2025-05-27

## 2025-05-17 ASSESSMENT — ENCOUNTER SYMPTOMS
NAUSEA: 0
VOMITING: 0
SINUS PAIN: 0
BACK PAIN: 0
COUGH: 1
SHORTNESS OF BREATH: 0
SORE THROAT: 0
CHEST TIGHTNESS: 0
EYE PAIN: 0
ABDOMINAL PAIN: 0
TROUBLE SWALLOWING: 0
WHEEZING: 0
CONSTIPATION: 0
APNEA: 0
DIARRHEA: 0
PHOTOPHOBIA: 0
RHINORRHEA: 1

## 2025-05-17 NOTE — ED TRIAGE NOTES
Pt to  for cough that started last week. Pt reports her PCP placed her on an atb Monday but reports cough has not improved. Pt has inhaler and breathing tx at home she is using.

## 2025-05-17 NOTE — ED PROVIDER NOTES
pain and neck stiffness.   Skin:  Negative for rash and wound.   Allergic/Immunologic: Positive for environmental allergies.   Neurological:  Negative for dizziness, tremors, seizures, speech difficulty, weakness, numbness and headaches.   Psychiatric/Behavioral:  Negative for behavioral problems and suicidal ideas. The patient is not nervous/anxious.    All other systems reviewed and are negative.      PAST MEDICAL HISTORY         Diagnosis Date    Anxiety disorder     history of anxiety, COVID related was on meds, not on meds anymore    Asthma     PRN inhaler    Disease of blood and blood forming organ     sickle cell trait    Nicotine dependence     Postpartum depression        SURGICALHISTORY     Patient  has a past surgical history that includes  section; thyroglossal duct excision (N/A, 2019); and  section (N/A, 2024).    CURRENT MEDICATIONS       Discharge Medication List as of 2025  4:27 PM        CONTINUE these medications which have NOT CHANGED    Details   azithromycin (ZITHROMAX) 250 MG tablet Historical Med      acetaminophen (TYLENOL) 500 MG tablet Take 1 tablet by mouth 4 times daily as needed for Pain, Disp-360 tablet, R-1Normal      !! albuterol (PROVENTIL) (2.5 MG/3ML) 0.083% nebulizer solution Take 3 mLs by nebulization every 6 hours as needed for Wheezing or Shortness of Breath, Disp-120 each, R-11Normal      azelastine (ASTELIN) 0.1 % nasal spray 1 spray by Nasal route 2 times daily Use in each nostril as directed, Disp-30 mL, R-0Normal      ibuprofen (ADVIL;MOTRIN) 800 MG tablet Take 1 tablet by mouth 3 times daily for 240 doses, Disp-120 tablet, R-1Normal      !! albuterol (PROVENTIL) (2.5 MG/3ML) 0.083% nebulizer solution Take 3 mLs by nebulization 4 times daily as needed for Wheezing, Disp-120 each, R-3Normal      !! albuterol (PROVENTIL) (2.5 MG/3ML) 0.083% nebulizer solution Take 3 mLs by nebulization every 6 hours as needed for Wheezing, Disp-60 each,    predniSONE (DELTASONE) 20 MG tablet Take 2 tablets by mouth daily for 5 days, THEN 1 tablet daily for 5 days., Disp-15 tablet, R-0Normal      brompheniramine-pseudoephedrine-DM 2-30-10 MG/5ML syrup Take 5 mLs by mouth 4 times daily as needed for Cough, Disp-118 mL, R-0Normal      benzonatate (TESSALON) 200 MG capsule Take 1 capsule by mouth 3 times daily as needed for Cough, Disp-30 capsule, R-0Normal             Discharge Medication List as of 5/17/2025  4:27 PM        STOP taking these medications       ATROVENT HFA 17 MCG/ACT inhaler Comments:   Reason for Stopping:               Discharge Medication List as of 5/17/2025  4:27 PM          ABHAY Odom CNP    (Please note that portions of this note were completed with a voice recognition program. Efforts were made to edit the dictations but occasionally words are mis-transcribed.)            Lindy Barajas APRN - CNP  05/17/25 1640

## 2025-05-17 NOTE — DISCHARGE INSTRUCTIONS
I sent in prednisone for you, tesssalon and bromfed.  Do not mix the bromfed or tessalon together.  Try them separately.      I recommend using tylenol and ibuprofen on an alternating schedule for fever/pain/body aches.  This gives you more control over how much you are taking vs compound medications like Dayquil or Nyquil.      Ensure that you are staying hydrated.      I recommend you start using a daily antihistamine, such as Zyrtec, to help reduce congestion.      You can use chlorpheniramine maleate as needed for cough at night and postnasal drip.      You can use Mucinex, I recommended without the DM.  Ensure that they are drinking extra water with this, take separately with a full glass of water.      You can use [place] pot or nasal rinse for symptom relief of sinus pressure.

## (undated) DEVICE — GLOVE SURG SZ 65 THK91MIL LTX FREE SYN POLYISOPRENE

## (undated) DEVICE — SUTURE ABSORBABLE MONOFILAMENT 0 CTX 60 IN VIO PDS + PDP990G

## (undated) DEVICE — SYSTEM TISS RETEN TRS

## (undated) DEVICE — SUTURE VCRL SZ 3-0 L27IN ABSRB UD L26MM SH 1/2 CIR J416H

## (undated) DEVICE — SOLUTION IV IRRIG POUR BRL 0.9% SODIUM CHL 2F7124

## (undated) DEVICE — SUTURE VCRL SZ 2-0 L27IN ABSRB UD L26MM SH 1/2 CIR J417H

## (undated) DEVICE — SUTURE VCRL SZ 3-0 L27IN ABSRB UD FS-2 L19MM 1/2 CIR J423H

## (undated) DEVICE — GOWN,SIRUS,NON REINFRCD,LARGE,SET IN SL: Brand: MEDLINE

## (undated) DEVICE — SOLUTION IRRIG 1000ML 0.9% SOD CHL USP POUR PLAS BTL

## (undated) DEVICE — SPONGE DRN W4XL4IN RAYON/POLYESTER 6 PLY NONWOVEN PRECUT

## (undated) DEVICE — APPLIER CLP L9.375IN APER 2.1MM CLS L3.8MM 20 SM TI CLP

## (undated) DEVICE — EVACUATOR SURG 100CC SIL BLB SUCT RESVR FOR CLS WND DRNGE

## (undated) DEVICE — MARKER,SKIN,WI/RULER AND LABELS: Brand: MEDLINE

## (undated) DEVICE — GLOVE ORANGE PI 7 1/2   MSG9075

## (undated) DEVICE — CHLORAPREP 26ML ORANGE

## (undated) DEVICE — CONMED DISPOSABLE BIPOLAR CABLE, 10' (3.05M): Brand: CONMED

## (undated) DEVICE — APPLIER CLP L9.38IN M LIG TI DISP STR RNG HNDL LIGACLP

## (undated) DEVICE — SUTURE MCRYL SZ 4-0 L27IN ABSRB UD L19MM PS-2 1/2 CIR PRIM Y426H

## (undated) DEVICE — SOLUTION IV IRRIG WATER 1000ML POUR BRL 2F7114

## (undated) DEVICE — SOLUTION SCRB 4OZ 4% CHG H2O AIDED FOR PREOPERATIVE SKIN

## (undated) DEVICE — SOLUTION IRRIG 1000ML STRL H2O USP PLAS POUR BTL

## (undated) DEVICE — 4-PORT MANIFOLD: Brand: NEPTUNE 2

## (undated) DEVICE — SUTURE CHROMIC GUT SZ 1 L36IN ABSRB BRN L48MM CTX 1/2 CIR 905H

## (undated) DEVICE — TUBING, SUCTION, 1/4" X 20', STRAIGHT: Brand: MEDLINE INDUSTRIES, INC.

## (undated) DEVICE — AGENT HEMSTAT W2XL4IN OXIDIZED REGENERATED CELOS ABSRB SFT

## (undated) DEVICE — SYRINGE IRRIG 60ML SFT PLIABLE BLB EZ TO GRP 1 HND USE W/

## (undated) DEVICE — SOLUTION SURG PREP 26 CC PURPREP

## (undated) DEVICE — SUTURE PLN GUT SZ 2-0 L27IN ABSRB YELLOWISH TAN L36MM CT-1 843H

## (undated) DEVICE — PACK PROCEDURE SURG C SECT SRMC LF

## (undated) DEVICE — 3M™ STERI-STRIP™ COMPOUND BENZOIN TINCTURE 40 BAGS/CARTON 4 CARTONS/CASE C1544: Brand: 3M™ STERI-STRIP™

## (undated) DEVICE — PATIENT RETURN ELECTRODE, SINGLE-USE, CONTACT QUALITY MONITORING, ADULT, WITH 9FT CORD, FOR PATIENTS WEIGING OVER 33LBS. (15KG): Brand: MEGADYNE

## (undated) DEVICE — BLADE LARYNSCP SZ 3 ENH DIR INTUB GLIDESCOPE MCGRATH MAC

## (undated) DEVICE — SPONGE,PEANUT,XRAY,ST,SM,3/8",5/CARD: Brand: MEDLINE INDUSTRIES, INC.

## (undated) DEVICE — GLOVE ORANGE PI 7   MSG9070

## (undated) DEVICE — POSITIONER HD W8XH4XL8.5IN RASPBERRY FOAM SLT

## (undated) DEVICE — TOWEL,OR,DSP,ST,BLUE,DLX,4/PK,20PK/CS: Brand: MEDLINE

## (undated) DEVICE — SUTURE VICRYL SZ 4-0 L27IN ABSRB UD L60MM KS STR REV CUT NDL J662H

## (undated) DEVICE — YANKAUER,BULB TIP,W/O VENT,RIGID,STERILE: Brand: MEDLINE

## (undated) DEVICE — 3M™ BAIR HUGGER® MULTI ACCESS BLANKET, PEDIATRIC, FULL BODY, 10 PER CASE 31000: Brand: BAIR HUGGER™

## (undated) DEVICE — GAUZE,SPONGE,8"X4",12PLY,XRAY,STRL,LF: Brand: MEDLINE

## (undated) DEVICE — SPONGE LAP W18XL18IN WHT COT 4 PLY FLD STRUNG RADPQ DISP ST 2 PER PACK

## (undated) DEVICE — SUTURE VICRYL + SZ 0 L27IN ABSRB UD L36MM CT-1 1/2 CIR VCPP41D

## (undated) DEVICE — LINER SUCT CANSTR 1500CC SEMI RIG W/ POR HYDROPHOBIC SHUT

## (undated) DEVICE — DRESSING HYDROFIBER AQUACEL AG ADVANTAGE 3.5X12 IN

## (undated) DEVICE — SOLUTION IV 1000ML 0.9% SOD CHL PH 5 INJ USP VIAFLX PLAS

## (undated) DEVICE — BLADE ES ELASTOMERIC COAT INSUL DURABLE BEND UPTO 90DEG

## (undated) DEVICE — DRAPE,INSTRUMENT,MAGNETIC,10X16: Brand: MEDLINE

## (undated) DEVICE — Device

## (undated) DEVICE — 3M™ STERI-DRAPE™ INSTRUMENT POUCH 1018: Brand: STERI-DRAPE™

## (undated) DEVICE — SPONGE GZ W4XL4IN COT 12 PLY TYP VII WVN C FLD DSGN

## (undated) DEVICE — DRESSING ANTIMIC W3.5XL14IN IONIC SIL FOAM POSTOP STRP

## (undated) DEVICE — DRAIN,WOUND,15FR,3/16,FULL-FLUTED: Brand: MEDLINE

## (undated) DEVICE — TAPE,CLOTH/SILK,CURAD,3"X10YD,LF,40/CS: Brand: CURAD

## (undated) DEVICE — DRAIN SURG FLAT W7MMXL20CM FULL PERF

## (undated) DEVICE — FAN SPRAY KIT: Brand: PULSAVAC®